# Patient Record
Sex: MALE | Race: OTHER | NOT HISPANIC OR LATINO | URBAN - METROPOLITAN AREA
[De-identification: names, ages, dates, MRNs, and addresses within clinical notes are randomized per-mention and may not be internally consistent; named-entity substitution may affect disease eponyms.]

---

## 2022-06-14 ENCOUNTER — INPATIENT (INPATIENT)
Facility: HOSPITAL | Age: 87
LOS: 5 days | Discharge: NURSING, REHAB OR LONG-TERM CARE | DRG: 92 | End: 2022-06-20
Attending: FAMILY MEDICINE | Admitting: INTERNAL MEDICINE
Payer: MEDICARE

## 2022-06-14 VITALS
TEMPERATURE: 98 F | RESPIRATION RATE: 18 BRPM | HEIGHT: 62 IN | OXYGEN SATURATION: 99 % | SYSTOLIC BLOOD PRESSURE: 100 MMHG | HEART RATE: 88 BPM | DIASTOLIC BLOOD PRESSURE: 66 MMHG | WEIGHT: 98.11 LBS

## 2022-06-14 DIAGNOSIS — Z29.9 ENCOUNTER FOR PROPHYLACTIC MEASURES, UNSPECIFIED: ICD-10-CM

## 2022-06-14 DIAGNOSIS — Z87.19 PERSONAL HISTORY OF OTHER DISEASES OF THE DIGESTIVE SYSTEM: ICD-10-CM

## 2022-06-14 DIAGNOSIS — S09.90XA UNSPECIFIED INJURY OF HEAD, INITIAL ENCOUNTER: ICD-10-CM

## 2022-06-14 DIAGNOSIS — Z87.09 PERSONAL HISTORY OF OTHER DISEASES OF THE RESPIRATORY SYSTEM: ICD-10-CM
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
## 2022-06-14 DIAGNOSIS — G96.08 OTHER CRANIAL CEREBROSPINAL FLUID LEAK: ICD-10-CM

## 2022-06-14 DIAGNOSIS — F32.9 MAJOR DEPRESSIVE DISORDER, SINGLE EPISODE, UNSPECIFIED: ICD-10-CM

## 2022-06-14 DIAGNOSIS — I10 ESSENTIAL (PRIMARY) HYPERTENSION: ICD-10-CM

## 2022-06-14 DIAGNOSIS — E78.5 HYPERLIPIDEMIA, UNSPECIFIED: ICD-10-CM

## 2022-06-14 LAB
ALBUMIN SERPL ELPH-MCNC: 3.6 G/DL — SIGNIFICANT CHANGE UP (ref 3.3–5)
ALP SERPL-CCNC: 92 U/L — SIGNIFICANT CHANGE UP (ref 40–120)
ALT FLD-CCNC: 20 U/L — SIGNIFICANT CHANGE UP (ref 12–78)
ANION GAP SERPL CALC-SCNC: 6 MMOL/L — SIGNIFICANT CHANGE UP (ref 5–17)
APTT BLD: 31.1 SEC — SIGNIFICANT CHANGE UP (ref 27.5–35.5)
AST SERPL-CCNC: 20 U/L — SIGNIFICANT CHANGE UP (ref 15–37)
BASOPHILS # BLD AUTO: 0.02 K/UL — SIGNIFICANT CHANGE UP (ref 0–0.2)
BASOPHILS NFR BLD AUTO: 0.2 % — SIGNIFICANT CHANGE UP (ref 0–2)
BILIRUB SERPL-MCNC: 0.8 MG/DL — SIGNIFICANT CHANGE UP (ref 0.2–1.2)
BUN SERPL-MCNC: 21 MG/DL — SIGNIFICANT CHANGE UP (ref 7–23)
CALCIUM SERPL-MCNC: 9.2 MG/DL — SIGNIFICANT CHANGE UP (ref 8.5–10.1)
CHLORIDE SERPL-SCNC: 107 MMOL/L — SIGNIFICANT CHANGE UP (ref 96–108)
CO2 SERPL-SCNC: 27 MMOL/L — SIGNIFICANT CHANGE UP (ref 22–31)
CREAT SERPL-MCNC: 1 MG/DL — SIGNIFICANT CHANGE UP (ref 0.5–1.3)
EGFR: 73 ML/MIN/1.73M2 — SIGNIFICANT CHANGE UP
EOSINOPHIL # BLD AUTO: 0 K/UL — SIGNIFICANT CHANGE UP (ref 0–0.5)
EOSINOPHIL NFR BLD AUTO: 0 % — SIGNIFICANT CHANGE UP (ref 0–6)
GLUCOSE SERPL-MCNC: 129 MG/DL — HIGH (ref 70–99)
HCT VFR BLD CALC: 42.4 % — SIGNIFICANT CHANGE UP (ref 39–50)
HGB BLD-MCNC: 13.6 G/DL — SIGNIFICANT CHANGE UP (ref 13–17)
IMM GRANULOCYTES NFR BLD AUTO: 0.7 % — SIGNIFICANT CHANGE UP (ref 0–1.5)
INR BLD: 1.07 RATIO — SIGNIFICANT CHANGE UP (ref 0.88–1.16)
LYMPHOCYTES # BLD AUTO: 0.94 K/UL — LOW (ref 1–3.3)
LYMPHOCYTES # BLD AUTO: 8.8 % — LOW (ref 13–44)
MCHC RBC-ENTMCNC: 28.5 PG — SIGNIFICANT CHANGE UP (ref 27–34)
MCHC RBC-ENTMCNC: 32.1 GM/DL — SIGNIFICANT CHANGE UP (ref 32–36)
MCV RBC AUTO: 88.9 FL — SIGNIFICANT CHANGE UP (ref 80–100)
MONOCYTES # BLD AUTO: 1.78 K/UL — HIGH (ref 0–0.9)
MONOCYTES NFR BLD AUTO: 16.6 % — HIGH (ref 2–14)
NEUTROPHILS # BLD AUTO: 7.89 K/UL — HIGH (ref 1.8–7.4)
NEUTROPHILS NFR BLD AUTO: 73.7 % — SIGNIFICANT CHANGE UP (ref 43–77)
NRBC # BLD: 0 /100 WBCS — SIGNIFICANT CHANGE UP (ref 0–0)
PLATELET # BLD AUTO: 200 K/UL — SIGNIFICANT CHANGE UP (ref 150–400)
POTASSIUM SERPL-MCNC: 4.1 MMOL/L — SIGNIFICANT CHANGE UP (ref 3.5–5.3)
POTASSIUM SERPL-SCNC: 4.1 MMOL/L — SIGNIFICANT CHANGE UP (ref 3.5–5.3)
PROT SERPL-MCNC: 7.2 G/DL — SIGNIFICANT CHANGE UP (ref 6–8.3)
PROTHROM AB SERPL-ACNC: 12.5 SEC — SIGNIFICANT CHANGE UP (ref 10.5–13.4)
RBC # BLD: 4.77 M/UL — SIGNIFICANT CHANGE UP (ref 4.2–5.8)
RBC # FLD: 14.1 % — SIGNIFICANT CHANGE UP (ref 10.3–14.5)
SARS-COV-2 RNA SPEC QL NAA+PROBE: SIGNIFICANT CHANGE UP
SODIUM SERPL-SCNC: 140 MMOL/L — SIGNIFICANT CHANGE UP (ref 135–145)
WBC # BLD: 10.7 K/UL — HIGH (ref 3.8–10.5)
WBC # FLD AUTO: 10.7 K/UL — HIGH (ref 3.8–10.5)

## 2022-06-14 PROCEDURE — 73610 X-RAY EXAM OF ANKLE: CPT | Mod: 26,LT

## 2022-06-14 PROCEDURE — 72125 CT NECK SPINE W/O DYE: CPT | Mod: 26,MA

## 2022-06-14 PROCEDURE — 73630 X-RAY EXAM OF FOOT: CPT | Mod: 26,LT

## 2022-06-14 PROCEDURE — 71045 X-RAY EXAM CHEST 1 VIEW: CPT | Mod: 26

## 2022-06-14 PROCEDURE — 99285 EMERGENCY DEPT VISIT HI MDM: CPT

## 2022-06-14 PROCEDURE — 70450 CT HEAD/BRAIN W/O DYE: CPT | Mod: 26,MA

## 2022-06-14 PROCEDURE — 72074 X-RAY EXAM THORAC SPINE4/>VW: CPT | Mod: 26

## 2022-06-14 PROCEDURE — 99223 1ST HOSP IP/OBS HIGH 75: CPT | Mod: GC

## 2022-06-14 RX ORDER — ALBUTEROL 90 UG/1
2 AEROSOL, METERED ORAL EVERY 6 HOURS
Refills: 0 | Status: DISCONTINUED | OUTPATIENT
Start: 2022-06-14 | End: 2022-06-20

## 2022-06-14 RX ORDER — ACETAMINOPHEN 500 MG
650 TABLET ORAL EVERY 6 HOURS
Refills: 0 | Status: DISCONTINUED | OUTPATIENT
Start: 2022-06-14 | End: 2022-06-20

## 2022-06-14 RX ORDER — MONTELUKAST 4 MG/1
10 TABLET, CHEWABLE ORAL DAILY
Refills: 0 | Status: DISCONTINUED | OUTPATIENT
Start: 2022-06-14 | End: 2022-06-20

## 2022-06-14 RX ORDER — BUDESONIDE AND FORMOTEROL FUMARATE DIHYDRATE 160; 4.5 UG/1; UG/1
2 AEROSOL RESPIRATORY (INHALATION)
Refills: 0 | Status: DISCONTINUED | OUTPATIENT
Start: 2022-06-14 | End: 2022-06-20

## 2022-06-14 RX ORDER — SERTRALINE 25 MG/1
25 TABLET, FILM COATED ORAL DAILY
Refills: 0 | Status: DISCONTINUED | OUTPATIENT
Start: 2022-06-14 | End: 2022-06-20

## 2022-06-14 RX ORDER — ATORVASTATIN CALCIUM 80 MG/1
10 TABLET, FILM COATED ORAL AT BEDTIME
Refills: 0 | Status: DISCONTINUED | OUTPATIENT
Start: 2022-06-14 | End: 2022-06-20

## 2022-06-14 RX ORDER — ACETAMINOPHEN 500 MG
650 TABLET ORAL ONCE
Refills: 0 | Status: COMPLETED | OUTPATIENT
Start: 2022-06-14 | End: 2022-06-14

## 2022-06-14 RX ADMIN — Medication 650 MILLIGRAM(S): at 21:31

## 2022-06-14 RX ADMIN — Medication 650 MILLIGRAM(S): at 13:04

## 2022-06-14 RX ADMIN — ATORVASTATIN CALCIUM 10 MILLIGRAM(S): 80 TABLET, FILM COATED ORAL at 21:31

## 2022-06-14 NOTE — H&P ADULT - NSHPPHYSICALEXAM_GEN_ALL_CORE
T(C): 36.8 (06-14-22 @ 19:42), Max: 36.9 (06-14-22 @ 12:32)  HR: 68 (06-14-22 @ 19:42) (68 - 88)  BP: 134/80 (06-14-22 @ 19:42) (100/66 - 134/80)  RR: 15 (06-14-22 @ 19:42) (15 - 18)  SpO2: 96% (06-14-22 @ 19:42) (95% - 99%)    Physical Exam:  Gen: well appearing, NAD  HEENT: NCAT, PEERLA b/l, EOMI b/l, no conjunctival erythema  Cardio: regular rate and rhythm, +s1s2, no murmurs, rubs, or gallops  Pulm: CTA b/l, no wheezes, rales or rhonchi  Abdomen: soft, nontender, nondistended, +BS x4 quadrants, no guarding  Extremities: +2 pedal pulses, dorsal aspect of left foot with ecchymosis, swelling and erythema, tender to palpation  Neuro: AAOx3, CNII-XII intact grossly, 5/5 strength all extremities, sensation intact  Skin: warm and dry

## 2022-06-14 NOTE — ED PROVIDER NOTE - CLINICAL SUMMARY MEDICAL DECISION MAKING FREE TEXT BOX
86 yo M p/w mech fall today - pt slipped off 2nd step at home, hit back of head. Pt co L foot / ankle pain as well as headache. Pt had co some mid back pain earlier, less now. no other acute inj or co. No LOC. NO numb/ting/focal weak. no abd pain. no n/v/d. no agg/allev factors. no other acute co or changes. Pt fully vaccinated for covid.  exam: MM Moist. neck supple. no meningeal signs. no ext signs of head trauma. min occipital tend, no edema. no lac. no spinal tend (c,t,l). Mild paraspinal lower thoracic tend, no edema. no crepitus.   L foot with mild tend to mid foot with slight edema, pos tend to L mid toe, Min tend to medial ankle. Nl dist str./sens equal bl, 2+ pulses. nl cap refill. no other acute findings.  [facility] no [facility]ag use. Check CT, XR, reeval

## 2022-06-14 NOTE — ED PROVIDER NOTE - CARE PLAN
1 Principal Discharge DX:	Head injury   Principal Discharge DX:	Head injury  Secondary Diagnosis:	Hygroma

## 2022-06-14 NOTE — ED PROVIDER NOTE - PHYSICAL EXAMINATION
Constitutional: Awake, Alert, non-toxic. NAD.  HEAD: Normocephalic, atraumatic.   EYES: PERRL, EOM intact, conjunctiva and sclera are clear bilaterally. No raccoon eyes.   ENT: No maldonado sign. No rhinorrhea, normal pharynx, patent, no tonsillar exudate or enlargement, mucous membranes pink/moist, no erythema, no drooling or stridor.   NECK: Supple, non-tender  BACK: No midline or paraspinal TTP of cervical/thoracic/lumbar spine, FROM. No ecchymosis or hematomas. no rib TTP.   CARDIOVASCULAR: Normal S1, S2; regular rate and rhythm.  RESPIRATORY: Normal respiratory effort; breath sounds CTAB, no wheezes, rhonchi, or rales. Speaking in full sentences. No accessory muscle use.   ABDOMEN: Soft; non-tender, non-distended.   EXTREMITIES: Full passive and active ROM in all extremities; non-tender to palpation; distal pulses palpable and symmetric, no edema, no crepitus or step off  SKIN: Warm, dry; good skin turgor, no apparent lesions or rashes, no ecchymosis, brisk capillary refill.  NEURO: A&O x3. Sensory and motor functions are grossly intact. Speech is normal. Appearance and judgement seem appropriate for gender and age. No neurological deficits. Neurovascular sensation intact motor function 5/5 of upper and lower extremities, CN II-XII grossly intact, no ataxia, absent pronator drift, intact cerebellar function. Speech clear, without articulation or word-finding difficulties. Eyes- PERRL bilaterally. EOMs in tact. No nystagmus. No facial droop.

## 2022-06-14 NOTE — ED ADULT TRIAGE NOTE - CHIEF COMPLAINT QUOTE
slipped and fell down 2 steps last night- no loc, fell and hit head, c/o left ankle pain., back pain- patient on baby aspirin

## 2022-06-14 NOTE — H&P ADULT - ASSESSMENT
Pt is an 87 y.o. M wit ha PMH of HTN, HLD, COPD, Depression, Hep C, Duodenal ulcer diease, Lung nodules who is found to have subdural hygromas on head CT. He is being admitted for further work-up and management of falls.

## 2022-06-14 NOTE — ED PROVIDER NOTE - NS ED ATTENDING STATEMENT MOD
This was a shared visit with the VINOD. I reviewed and verified the documentation and independently performed the documented:

## 2022-06-14 NOTE — ED ADULT NURSE NOTE - OBJECTIVE STATEMENT
Received the patient in the Er. Patient is alert and oriented. S/P slipped and fell down 2 steps last night- no loc, fell and hit head, c/o left ankle pain., back pain- patient on baby aspirin. Able to move all extremities.

## 2022-06-14 NOTE — H&P ADULT - NSHPSOCIALHISTORY_GEN_ALL_CORE
Denies use of etoh, tobacco and drug use. Denies use of etoh, tobacco and drug use.  Former smoker quit 15 years ago, >50 pack year history  lives home with daughter  uses cane to ambulate

## 2022-06-14 NOTE — H&P ADULT - PROBLEM SELECTOR PLAN 1
Hx fall  CT imaging - No acute intracranial pathology or MSK fractures. No C-spine/T-Spine pathology.  Subdural Hygromas findings with mass effect noted.  -Fall, safety precautions. Bed alarm  -Activity, ambulate with assistance  -Tylenol PRN for ankle pain.  -PT consulted. Hx fall - No LOC, admits hitting head, has pain in left foot/ankle  CT imaging - No acute intracranial pathology or MSK fractures. No C-spine/T-Spine pathology.  Subdural Hygromas findings with mass effect noted.  -left ankle xray - negative for acute fractures, will obtain ct of left foot and ankle to r/o occult fractures  -Fall, safety precautions. Bed alarm  -Activity, ambulate with assistance  -Tylenol PRN for ankle pain.  -PT consulted.

## 2022-06-14 NOTE — ED PROVIDER NOTE - OBJECTIVE STATEMENT
88 y/o male with PMHx HLD and COPD presents today due to fall. Pt fell yesterday after slipped on 2nd to bottom step. pt hit the back of his head and landed on back. pt reports pain to left ankle, aching, non-radiating, and currently 6/10. pt currently takes baby ASA. pt denies numbness/weakness, LOC, vomiting, visual change, cp, abd pain, neck pain, or any other complaints.   BEAU Salas

## 2022-06-14 NOTE — H&P ADULT - NSHPREVIEWOFSYSTEMS_GEN_ALL_CORE
Constitutional: denies fever, chills, sweating  HEENT: denies headache, dizziness, or lightheadedness  Respiratory: denies SOB, cough, or wheezing  Cardiovascular: denies CP, palpitations  Gastrointestinal: denies nausea, vomiting, diarrhea, constipation, abdominal pain, or bloody stools  Genitourinary: denies painful urination, increased frequency, urgency, or bloody urine  Skin/Breast: denies rashes or itching  Musculoskeletal: admits left foot pain, denies muscle aches, joint swelling, or muscle weakness  Neurologic: denies loss of sensation, numbness, or tingling  ROS negative except as noted above

## 2022-06-14 NOTE — H&P ADULT - HISTORY OF PRESENT ILLNESS
Pt is an 87 y.o. M wit ha PMH of HTN, HLD, COPD, Depression, Hep C, Duodenal ulcer diease, Lung nodules who presented to the ED with a CC of falls.    In the ED:  T 98.5 HR 88 /66 RR 18 SpO2 99% on 4L NC  WBC 10.70 Glucose 129  Head CT Non-Con: No acute intracranial injury. No acute C-spine fracture. SUBDURAL HYGROMAS ALONG THE LEFT FRONTAL TEMPORAL CONVEXITY AS WELL AS   THE LEFT LATERAL ASPECT OF THE POSTERIOR CRANIAL FOSSA MEASURING UP TO 1 CM IN THICKNESS WITH REGIONAL MASS EFFECT.  Xray Foot, T-Spine, Chest, Ankle: Slight residual right lung densities. No acute bony findings.  Tylenol 650mg PO x 1 Pt is an 87 y.o. M wit ha PMH of HTN, HLD, COPD, Depression, Hep C, Duodenal ulcer diease, Lung nodules who presented to the ED with a CC of falls. Patient states that 2 days ago he was walking down the stairs at his house when he slipped on his socks and fell. He states he hurt his left foot and hit the back of his head. Denies any LOC. Denies fever, chills, headache, dizziness, chest pain, shortness of breath, cough, nausea, vomiting, abdominal pain, diarrhea or constipation. Of note patients daughter states he has been having balance issues over the past few months and has fallen a few times.     In the ED:  T 98.5 HR 88 /66 RR 18 SpO2 99% on 4L NC  WBC 10.70 Glucose 129  Head CT Non-Con: No acute intracranial injury. No acute C-spine fracture. SUBDURAL HYGROMAS ALONG THE LEFT FRONTAL TEMPORAL CONVEXITY AS WELL AS   THE LEFT LATERAL ASPECT OF THE POSTERIOR CRANIAL FOSSA MEASURING UP TO 1 CM IN THICKNESS WITH REGIONAL MASS EFFECT.  Xray Foot, T-Spine, Chest, Ankle: Slight residual right lung densities. No acute bony findings.  Tylenol 650mg PO x 1

## 2022-06-14 NOTE — H&P ADULT - PROBLEM SELECTOR PLAN 2
CT head: subdural hygromas along the left frontal temporal convexity as well as the left lateral aspect of the posterior cranial fossa measuring up to 1 cm in thickness with regional mass effect  -Neuro surgery consulted by ED - no need for transfer  -repeat CT head in AM to monitor for any changes  -neuro checks q 4  -fall risk protocol CT head: subdural hygromas along the left frontal temporal convexity as well as the left lateral aspect of the posterior cranial fossa measuring up to 1 cm in thickness with regional mass effect  -repeat head CT in AM  -if any acute change in neurologic status re-consult neurosurgery  -Neuro surgery consulted Dr. Freire - no need for transfer  -neuro checks q 4  -fall risk protocol CT head: subdural hygromas along the left frontal temporal convexity as well as the left lateral aspect of the posterior cranial fossa measuring up to 1 cm in thickness with regional mass effect  -repeat head CT in AM  -if any acute change in neurologic status re-consult neurosurgery  -Neuro surgery consulted Dr. Freire - no need for transfer  -neuro checks q 4  -fall risk protocol  -Neurology consulted Dr Cuevas

## 2022-06-14 NOTE — ED ADULT NURSE REASSESSMENT NOTE - NS ED NURSE REASSESS COMMENT FT1
1942- Received pt from previous RN. Pt Aox4 breathing equal and unlabored. pt follows commands, moving all extremities. pt has swelling and bruising to L ankle. Ice applied. pt has positive pedal pulses bilaterally. positive sensation to all extremities. skin warm, dry and good color. pupils are +2mm equal and reactive. pt NSR on cardiac monitor. Safety measures maintained. call bell within reach. will continue to monitor. MIRANDA, RN
2214- pt stable for transport to the floor, denies pain breathing equal and unlabored. pt in no acute distress, accompanied by RN
Resting comfortably. patient had dinner and tolerated well.
2134- pt medicated as ordered. resting comfortably in stretcher. pt repositioned in stretcher, warm blankets provided. pt in no acute distress. call bell within reach. will continue to monitor. MIRANDA, RN

## 2022-06-14 NOTE — H&P ADULT - PROBLEM SELECTOR PLAN 4
-Continue montelukast, trelegy, ventolin. 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

## 2022-06-14 NOTE — ED PROVIDER NOTE - NSICDXFAMILYHX_GEN_ALL_CORE_FT
FAMILY HISTORY:  Father  Still living? No  Family history of cerebral infarction, Age at diagnosis: Age Unknown

## 2022-06-14 NOTE — H&P ADULT - ATTENDING COMMENTS
Pt is an 87 y.o. M wit ha PMH of HTN, HLD, COPD, Depression, Hep C, Duodenal ulcer diease, Lung nodules who is found to have subdural hygromas on head CT. He is being admitted for further work-up and management of falls.    Patient seen and examined at bedside. HPI as above.     T(C): 36.8 (06-14-22 @ 19:42), Max: 36.9 (06-14-22 @ 12:32)  HR: 68 (06-14-22 @ 19:42) (68 - 88)  BP: 134/80 (06-14-22 @ 19:42) (100/66 - 134/80)  RR: 15 (06-14-22 @ 19:42) (15 - 18)  SpO2: 96% (06-14-22 @ 19:42) (95% - 99%)  Wt(kg): --    Physical Exam:   GENERAL: well-groomed, NAD  HEENT: head NC/AT; EOM intact, PERRLA, conjunctiva & sclera clear; hearing grossly intact, moist mucous membranes  NECK: supple, no JVD  RESPIRATORY: CTA B/L, no wheezing, rales, rhonchi or rubs  CARDIOVASCULAR: S1&S2, RRR, no murmurs or gallops  ABDOMEN: soft, non-tender, non-distended, + Bowel sounds x4 quadrants, no guarding, rebound or rigidity  MUSCULOSKELETAL:  no clubbing, cyanosis or edema of all 4 extremities [aside from left ankle swelling]  LYMPH: no cervical lymphadenopathy  VASCULAR: Radial pulses 2+ bilaterally, no varicose veins   SKIN: warm and dry, color normal  NEUROLOGIC: AA&O X3, CN2-12 intact w/ no focal deficits, no sensory loss, motor Strength 4/5 in UE & LE B/L, speech fluent  Psych: Normal mood and affect, normal behavior    Plan:  Hygroma: likely sequalae of a previous fall.   -discussed personally with neurosurgery Dr. Freire [service # [phone]]  -repeat head CT in AM  -neuro checks as ordered.   -if any acute change in neurologic status re-consult neurosurgery  -PT consult tomorrow  -social work consult for possible rehab.     HTN: BP stable and at goal.     COPD: no acute exacerbation.   -continue inhalers as ordered.     DVT ppx: SCD"s

## 2022-06-14 NOTE — ED ADULT NURSE NOTE - NSIMPLEMENTINTERV_GEN_ALL_ED
Implemented All Fall with Harm Risk Interventions:  Crystal to call system. Call bell, personal items and telephone within reach. Instruct patient to call for assistance. Room bathroom lighting operational. Non-slip footwear when patient is off stretcher. Physically safe environment: no spills, clutter or unnecessary equipment. Stretcher in lowest position, wheels locked, appropriate side rails in place. Provide visual cue, wrist band, yellow gown, etc. Monitor gait and stability. Monitor for mental status changes and reorient to person, place, and time. Review medications for side effects contributing to fall risk. Reinforce activity limits and safety measures with patient and family. Provide visual clues: red socks.

## 2022-06-14 NOTE — ED PROVIDER NOTE - NSICDXPASTMEDICALHX_GEN_ALL_CORE_FT
PAST MEDICAL HISTORY:  COPD (chronic obstructive pulmonary disease)     Depression     Duodenal ulcer disease     Emphysema     Hepatitis C patients daughter states that she was told that the patient has hepitatis c    High cholesterol     Hypertension     Lung nodule, multiple

## 2022-06-14 NOTE — H&P ADULT - NSICDXPASTMEDICALHX_GEN_ALL_CORE_FT
PAST MEDICAL HISTORY:  COPD (chronic obstructive pulmonary disease)     Depression     Duodenal ulcer disease     Emphysema     Hepatitis C patients daughter states that she was told that the patient has hepitatis c    HLD (hyperlipidemia)     Lung nodule, multiple

## 2022-06-15 DIAGNOSIS — S92.109A UNSPECIFIED FRACTURE OF UNSPECIFIED TALUS, INITIAL ENCOUNTER FOR CLOSED FRACTURE: ICD-10-CM

## 2022-06-15 DIAGNOSIS — S92.912A UNSPECIFIED FRACTURE OF LEFT TOE(S), INITIAL ENCOUNTER FOR CLOSED FRACTURE: ICD-10-CM

## 2022-06-15 DIAGNOSIS — S92.002A UNSPECIFIED FRACTURE OF LEFT CALCANEUS, INITIAL ENCOUNTER FOR CLOSED FRACTURE: ICD-10-CM

## 2022-06-15 LAB
ANION GAP SERPL CALC-SCNC: 8 MMOL/L — SIGNIFICANT CHANGE UP (ref 5–17)
BASOPHILS # BLD AUTO: 0.02 K/UL — SIGNIFICANT CHANGE UP (ref 0–0.2)
BASOPHILS NFR BLD AUTO: 0.2 % — SIGNIFICANT CHANGE UP (ref 0–2)
BUN SERPL-MCNC: 19 MG/DL — SIGNIFICANT CHANGE UP (ref 7–23)
CALCIUM SERPL-MCNC: 9.9 MG/DL — SIGNIFICANT CHANGE UP (ref 8.5–10.1)
CHLORIDE SERPL-SCNC: 103 MMOL/L — SIGNIFICANT CHANGE UP (ref 96–108)
CO2 SERPL-SCNC: 28 MMOL/L — SIGNIFICANT CHANGE UP (ref 22–31)
CREAT SERPL-MCNC: 1 MG/DL — SIGNIFICANT CHANGE UP (ref 0.5–1.3)
EGFR: 73 ML/MIN/1.73M2 — SIGNIFICANT CHANGE UP
EOSINOPHIL # BLD AUTO: 0.02 K/UL — SIGNIFICANT CHANGE UP (ref 0–0.5)
EOSINOPHIL NFR BLD AUTO: 0.2 % — SIGNIFICANT CHANGE UP (ref 0–6)
GLUCOSE SERPL-MCNC: 176 MG/DL — HIGH (ref 70–99)
HCT VFR BLD CALC: 45.2 % — SIGNIFICANT CHANGE UP (ref 39–50)
HGB BLD-MCNC: 14.5 G/DL — SIGNIFICANT CHANGE UP (ref 13–17)
IMM GRANULOCYTES NFR BLD AUTO: 0.6 % — SIGNIFICANT CHANGE UP (ref 0–1.5)
LYMPHOCYTES # BLD AUTO: 0.79 K/UL — LOW (ref 1–3.3)
LYMPHOCYTES # BLD AUTO: 7.3 % — LOW (ref 13–44)
MCHC RBC-ENTMCNC: 28.8 PG — SIGNIFICANT CHANGE UP (ref 27–34)
MCHC RBC-ENTMCNC: 32.1 GM/DL — SIGNIFICANT CHANGE UP (ref 32–36)
MCV RBC AUTO: 89.9 FL — SIGNIFICANT CHANGE UP (ref 80–100)
MONOCYTES # BLD AUTO: 1.41 K/UL — HIGH (ref 0–0.9)
MONOCYTES NFR BLD AUTO: 13 % — SIGNIFICANT CHANGE UP (ref 2–14)
NEUTROPHILS # BLD AUTO: 8.54 K/UL — HIGH (ref 1.8–7.4)
NEUTROPHILS NFR BLD AUTO: 78.7 % — HIGH (ref 43–77)
NRBC # BLD: 0 /100 WBCS — SIGNIFICANT CHANGE UP (ref 0–0)
PLATELET # BLD AUTO: 212 K/UL — SIGNIFICANT CHANGE UP (ref 150–400)
POTASSIUM SERPL-MCNC: 3.8 MMOL/L — SIGNIFICANT CHANGE UP (ref 3.5–5.3)
POTASSIUM SERPL-SCNC: 3.8 MMOL/L — SIGNIFICANT CHANGE UP (ref 3.5–5.3)
RBC # BLD: 5.03 M/UL — SIGNIFICANT CHANGE UP (ref 4.2–5.8)
RBC # FLD: 14.4 % — SIGNIFICANT CHANGE UP (ref 10.3–14.5)
SODIUM SERPL-SCNC: 139 MMOL/L — SIGNIFICANT CHANGE UP (ref 135–145)
WBC # BLD: 10.85 K/UL — HIGH (ref 3.8–10.5)
WBC # FLD AUTO: 10.85 K/UL — HIGH (ref 3.8–10.5)

## 2022-06-15 PROCEDURE — 73700 CT LOWER EXTREMITY W/O DYE: CPT | Mod: 26,LT

## 2022-06-15 PROCEDURE — 12345: CPT | Mod: NC

## 2022-06-15 PROCEDURE — 99221 1ST HOSP IP/OBS SF/LOW 40: CPT

## 2022-06-15 PROCEDURE — 71045 X-RAY EXAM CHEST 1 VIEW: CPT | Mod: 26

## 2022-06-15 PROCEDURE — 99233 SBSQ HOSP IP/OBS HIGH 50: CPT | Mod: GC

## 2022-06-15 PROCEDURE — 76376 3D RENDER W/INTRP POSTPROCES: CPT | Mod: 26

## 2022-06-15 PROCEDURE — 70450 CT HEAD/BRAIN W/O DYE: CPT | Mod: 26

## 2022-06-15 PROCEDURE — 70551 MRI BRAIN STEM W/O DYE: CPT | Mod: 26

## 2022-06-15 RX ORDER — ENOXAPARIN SODIUM 100 MG/ML
40 INJECTION SUBCUTANEOUS EVERY 24 HOURS
Refills: 0 | Status: DISCONTINUED | OUTPATIENT
Start: 2022-06-15 | End: 2022-06-20

## 2022-06-15 RX ADMIN — ATORVASTATIN CALCIUM 10 MILLIGRAM(S): 80 TABLET, FILM COATED ORAL at 21:03

## 2022-06-15 RX ADMIN — Medication 650 MILLIGRAM(S): at 20:12

## 2022-06-15 RX ADMIN — Medication 650 MILLIGRAM(S): at 21:02

## 2022-06-15 RX ADMIN — BUDESONIDE AND FORMOTEROL FUMARATE DIHYDRATE 2 PUFF(S): 160; 4.5 AEROSOL RESPIRATORY (INHALATION) at 06:32

## 2022-06-15 RX ADMIN — Medication 0.5 MILLIGRAM(S): at 13:13

## 2022-06-15 RX ADMIN — SERTRALINE 25 MILLIGRAM(S): 25 TABLET, FILM COATED ORAL at 11:27

## 2022-06-15 RX ADMIN — BUDESONIDE AND FORMOTEROL FUMARATE DIHYDRATE 2 PUFF(S): 160; 4.5 AEROSOL RESPIRATORY (INHALATION) at 17:01

## 2022-06-15 RX ADMIN — MONTELUKAST 10 MILLIGRAM(S): 4 TABLET, CHEWABLE ORAL at 11:26

## 2022-06-15 RX ADMIN — ENOXAPARIN SODIUM 40 MILLIGRAM(S): 100 INJECTION SUBCUTANEOUS at 17:01

## 2022-06-15 NOTE — PATIENT PROFILE ADULT - FALL HARM RISK - HARM RISK INTERVENTIONS

## 2022-06-15 NOTE — CONSULT NOTE ADULT - PROBLEM SELECTOR RECOMMENDATION 9
Pt seen and evaluated  - All films reviewed: LLE: Non-displaced fractures of talar head; Non-displaced anterior process of the calcaneus; Non-displaced fractures of the base of the proximal phalanx 2nd toe  - All fractures stable and non displaced  - No surgical intervention at this time. Pt is stable from podiatry standpoint.   - Pt to remain Non-weight bearing to the LLE  - Continue pain management  - Discharge planning per primary team Pt seen and evaluated  - All films reviewed: LLE: Non-displaced fractures of talar head; Non-displaced anterior process of the calcaneus; Non-displaced fractures of the base of the proximal phalanx 2nd toe  - All fractures stable and non displaced  - No surgical intervention at this time. Pt is stable from podiatry standpoint.   - Ace dressing applied  - Pt to remain Non-weight bearing to the LLE  - Social work or Case Management: to contact Mynor Miller with [employer] for E CAM Boot. Please call   [phone] for order purchase requisition to acquire CAM boot  - Continue pain management  - Discharge planning per primary team

## 2022-06-15 NOTE — PROGRESS NOTE ADULT - SUBJECTIVE AND OBJECTIVE BOX
Patient is a 87y old  Male who presents with a chief complaint of Fall (15 William 2022 12:13)      INTERVAL HPI/OVERNIGHT EVENTS: Patient seen and examined at bedside. No overnight events occurred. Patient is complaining of a slight frontal headache after the CT head this AM. He's also complaining of LLE pain and bruising. Denies fevers, chills, lightheadedness, chest pain, dyspnea, abdominal pain, n/v/d/c.    MEDICATIONS  (STANDING):  atorvastatin 10 milliGRAM(s) Oral at bedtime  budesonide  80 MICROgram(s)/formoterol 4.5 MICROgram(s) Inhaler 2 Puff(s) Inhalation two times a day  LORazepam   Injectable 0.5 milliGRAM(s) IV Push once  montelukast 10 milliGRAM(s) Oral daily  sertraline 25 milliGRAM(s) Oral daily    MEDICATIONS  (PRN):  acetaminophen     Tablet .. 650 milliGRAM(s) Oral every 6 hours PRN Temp greater or equal to 38C (100.4F), Mild Pain (1 - 3)  ALBUTerol    90 MICROgram(s) HFA Inhaler 2 Puff(s) Inhalation every 6 hours PRN Shortness of Breath and/or Wheezing      Allergies    No Known Allergies    Intolerances        REVIEW OF SYSTEMS:  CONSTITUTIONAL: No fever or chills  HEENT:  Admits to headache, no sore throat  RESPIRATORY: No cough, wheezing, or shortness of breath  CARDIOVASCULAR: No chest pain, palpitations  GASTROINTESTINAL: No abd pain, nausea, vomiting, or diarrhea  GENITOURINARY: No dysuria, frequency, or hematuria  NEUROLOGICAL: no focal weakness or dizziness  MUSCULOSKELETAL: admits to LLE pain     Vital Signs Last 24 Hrs  T(C): 37.2 (15 William 2022 11:47), Max: 37.2 (15 William 2022 11:47)  T(F): 98.9 (15 William 2022 11:47), Max: 98.9 (15 William 2022 11:47)  HR: 80 (15 William 2022 11:47) (68 - 80)  BP: 137/73 (15 William 2022 11:47) (110/60 - 146/74)  BP(mean): --  RR: 18 (15 William 2022 11:47) (15 - 18)  SpO2: 92% (15 William 2022 11:47) (92% - 98%)    PHYSICAL EXAM:  GENERAL: NAD, eating breakfast   HEENT:  anicteric, moist mucous membranes  CHEST/LUNG:  decreased left lung field breath sounds, no rales, wheezes, or rhonchi  HEART:  RRR, S1, S2  ABDOMEN:  BS+, soft, nontender, nondistended  EXTREMITIES: LLE 2nd digit ecchymotic, entire LLE swollen, ROM intact   NERVOUS SYSTEM: answers questions and follows commands appropriately    LABS:                        14.5   10.85 )-----------( 212      ( 15 William 2022 09:20 )             45.2     CBC Full  -  ( 15 William 2022 09:20 )  WBC Count : 10.85 K/uL  Hemoglobin : 14.5 g/dL  Hematocrit : 45.2 %  Platelet Count - Automated : 212 K/uL  Mean Cell Volume : 89.9 fl  Mean Cell Hemoglobin : 28.8 pg  Mean Cell Hemoglobin Concentration : 32.1 gm/dL  Auto Neutrophil # : 8.54 K/uL  Auto Lymphocyte # : 0.79 K/uL  Auto Monocyte # : 1.41 K/uL  Auto Eosinophil # : 0.02 K/uL  Auto Basophil # : 0.02 K/uL  Auto Neutrophil % : 78.7 %  Auto Lymphocyte % : 7.3 %  Auto Monocyte % : 13.0 %  Auto Eosinophil % : 0.2 %  Auto Basophil % : 0.2 %    15 William 2022 09:20    139    |  103    |  19     ----------------------------<  176    3.8     |  28     |  1.00     Ca    9.9        15 William 2022 09:20    TPro  7.2    /  Alb  3.6    /  TBili  0.8    /  DBili  x      /  AST  20     /  ALT  20     /  AlkPhos  92     14 Jun 2022 15:22    PT/INR - ( 14 Jun 2022 15:22 )   PT: 12.5 sec;   INR: 1.07 ratio         PTT - ( 14 Jun 2022 15:22 )  PTT:31.1 sec    CAPILLARY BLOOD GLUCOSE              RADIOLOGY & ADDITIONAL TESTS:  < from: CT Ankle No Cont, Left (06.15.22 @ 09:05) >  1. Mildly displaced cortical fracture involves the dorsum of the talar   head.  2. Nondisplaced fracture of the anterior process of the calcaneus with   nonosseous calcaneonavicular coalition.  3. Nondisplaced fracture of the 2nd proximal phalanx base.    < end of copied text >      Personally reviewed.     Consultant(s) Notes Reviewed:  [x] YES  [ ] NO

## 2022-06-15 NOTE — PROGRESS NOTE ADULT - SUBJECTIVE AND OBJECTIVE BOX
neuro cons dict  seen for fall/subdural hygroma  repeat ct head- unchanged  brain mri.  management dw dr diggs  and daughter nikki

## 2022-06-15 NOTE — CONSULT NOTE ADULT - SUBJECTIVE AND OBJECTIVE BOX
HPI:  Pt is an 87 y.o. M wit ha PMH of HTN, HLD, COPD, Depression, Hep C, Duodenal ulcer diease, Lung nodules who presented to the ED with a CC of falls. Patient states that 2 days ago he was walking down the stairs at his house when he slipped on his socks and fell. He states he hurt his left foot and hit the back of his head. Denies any LOC. Denies fever, chills, headache, dizziness, chest pain, shortness of breath, cough, nausea, vomiting, abdominal pain, diarrhea or constipation. Of note patients daughter states he has been having balance issues over the past few months and has fallen a few times.     In the ED:  T 98.5 HR 88 /66 RR 18 SpO2 99% on 4L NC  WBC 10.70 Glucose 129  Head CT Non-Con: No acute intracranial injury. No acute C-spine fracture. SUBDURAL HYGROMAS ALONG THE LEFT FRONTAL TEMPORAL CONVEXITY AS WELL AS   THE LEFT LATERAL ASPECT OF THE POSTERIOR CRANIAL FOSSA MEASURING UP TO 1 CM IN THICKNESS WITH REGIONAL MASS EFFECT.  Xray Foot, T-Spine, Chest, Ankle: Slight residual right lung densities. No acute bony findings.  Tylenol 650mg PO x 1 (2022 17:51)      87y year old Male seen at [facility] TELE 317 W1 for ----------.  Denies any fever, chills, nausea, vomiting, chest pain, shortness of breath, or calf pain at this time.    REVIEW OF SYSTEMS    PAST MEDICAL & SURGICAL HISTORY:  Lung nodule, multiple      COPD (chronic obstructive pulmonary disease)      Depression      Emphysema      Duodenal ulcer disease      Hepatitis C  patients daughter states that she was told that the patient has hepitatis c      HLD (hyperlipidemia)      No significant past surgical history          Allergies    No Known Allergies    Intolerances        MEDICATIONS  (STANDING):  atorvastatin 10 milliGRAM(s) Oral at bedtime  budesonide  80 MICROgram(s)/formoterol 4.5 MICROgram(s) Inhaler 2 Puff(s) Inhalation two times a day  LORazepam   Injectable 0.5 milliGRAM(s) IV Push once  montelukast 10 milliGRAM(s) Oral daily  sertraline 25 milliGRAM(s) Oral daily    MEDICATIONS  (PRN):  acetaminophen     Tablet .. 650 milliGRAM(s) Oral every 6 hours PRN Temp greater or equal to 38C (100.4F), Mild Pain (1 - 3)  ALBUTerol    90 MICROgram(s) HFA Inhaler 2 Puff(s) Inhalation every 6 hours PRN Shortness of Breath and/or Wheezing      Social History:  Denies use of etoh, tobacco and drug use.  Former smoker quit 15 years ago, >50 pack year history  lives home with daughter  uses cane to ambulate (2022 17:51)      FAMILY HISTORY:  Family history of cerebral infarction (Father)  father  age 71        Vital Signs Last 24 Hrs  T(C): 37.2 (15 William 2022 11:47), Max: 37.2 (15 William 2022 11:47)  T(F): 98.9 (15 William 2022 11:47), Max: 98.9 (15 William 2022 11:47)  HR: 80 (15 William 2022 11:47) (68 - 88)  BP: 137/73 (15 William 2022 11:47) (100/66 - 146/74)  BP(mean): --  RR: 18 (15 William 2022 11:47) (15 - 18)  SpO2: 92% (15 William 2022 11:47) (92% - 99%)    PHYSICAL EXAM:  Vascular: DP & PT palpable bilaterally, Capillary refill 3 seconds, Digital hair present bilaterally  Neurological: Light touch sensation intact bilaterally  Musculoskeletal: 5/5 strength in all quadrants bilaterally, AJ & STJ ROM intact  Dermatological: ---------- cm ulceration noted to the ----------, granular wound bed, no probe to bone, no periwound erythema, no fluctuance, no malodor, no proximal streaking at this time    CBC Full  -  ( 15 William 2022 09:20 )  WBC Count : 10.85 K/uL  RBC Count : 5.03 M/uL  Hemoglobin : 14.5 g/dL  Hematocrit : 45.2 %  Platelet Count - Automated : 212 K/uL  Mean Cell Volume : 89.9 fl  Mean Cell Hemoglobin : 28.8 pg  Mean Cell Hemoglobin Concentration : 32.1 gm/dL  Auto Neutrophil # : 8.54 K/uL  Auto Lymphocyte # : 0.79 K/uL  Auto Monocyte # : 1.41 K/uL  Auto Eosinophil # : 0.02 K/uL  Auto Basophil # : 0.02 K/uL  Auto Neutrophil % : 78.7 %  Auto Lymphocyte % : 7.3 %  Auto Monocyte % : 13.0 %  Auto Eosinophil % : 0.2 %  Auto Basophil % : 0.2 %      ----------CHEM PANEL----------    PT/INR - ( 2022 15:22 )   PT: 12.5 sec;   INR: 1.07 ratio         PTT - ( 2022 15:22 )  PTT:31.1 sec        Imaging:   ACC: [id number] [facility]  ACC: [id number] EXAM: CT FOOT ONLY LT  ACC: [id number] EXAM: CT ANKLE ONLY LT  ACC: [id number] [facility]    PROCEDURE DATE: 06/15/2022        INTERPRETATION: CT ANKLE LEFT, CT 3D RECONSTRUCTION WO WORKSTATION, CT FOOT LEFT, CT 3D RECONSTRUCTION WO WORKSTATION    HISTORY: Pain, fall.    TECHNIQUE: Contiguous axial imaging was performed through the left ankle/foot. Coronal and sagittal reformatting was utilized. 3-D reformatting was performed    COMPARISON: Left foot and ankle x-rays dated 2022    FINDINGS:    OSSEOUS STRUCTURES  Acute Fractures: Mildly displaced cortical fracture involves the dorsal margin of the talar head. There is a nondisplaced fracture involving the anterior process of the calcaneus. Nondisplaced transverse fracture is present involving the 2nd proximal phalanx base.  Chronic Fractures/Ossifications: None.  Tarsal Coalition: Anterior process of the calcaneus is elongated and there is nonosseous calcaneonavicular coalition with cortical irregularity and cystic changes at the coalition.    JOINTS  Tibiotalar Joint: Intact.  Subtalar Joints: Intact.  Intertarsal Joints: Intact.  Tarsometatarsal Joints: Intact.  Metatarsophalangeal Joints: Intact.  Interphalangeal Joints: Intact.    MYOTENDINOUS STRUCTURES  Intact within limits of CT.    OTHER SOFT TISSUES  Mild to moderate atherosclerotic calcifications are present.  Mild subcutaneous edema is present.    IMPRESSION:  1. Mildly displaced cortical fracture involves the dorsum of the talar head.  2. Nondisplaced fracture of the anterior process of the calcaneus with nonosseous calcaneonavicular coalition.  3. Nondisplaced fracture of the 2nd proximal phalanx base.    --- End of Report ---       HPI:  Pt is an 87 y.o. M wit ha PMH of HTN, HLD, COPD, Depression, Hep C, Duodenal ulcer diease, Lung nodules who presented to the ED with a CC of falls. Patient states that 2 days ago he was walking down the stairs at his house when he slipped on his socks and fell. He states he hurt his left foot and hit the back of his head. Denies any LOC. Denies fever, chills, headache, dizziness, chest pain, shortness of breath, cough, nausea, vomiting, abdominal pain, diarrhea or constipation. Of note patients daughter states he has been having balance issues over the past few months and has fallen a few times.     In the ED:  T 98.5 HR 88 /66 RR 18 SpO2 99% on 4L NC  WBC 10.70 Glucose 129  Head CT Non-Con: No acute intracranial injury. No acute C-spine fracture. SUBDURAL HYGROMAS ALONG THE LEFT FRONTAL TEMPORAL CONVEXITY AS WELL AS   THE LEFT LATERAL ASPECT OF THE POSTERIOR CRANIAL FOSSA MEASURING UP TO 1 CM IN THICKNESS WITH REGIONAL MASS EFFECT.  Xray Foot, T-Spine, Chest, Ankle: Slight residual right lung densities. No acute bony findings.  Tylenol 650mg PO x 1 (2022 17:51)      87y year old Male seen at [facility] TELE 317 W1 for Multiple foot and ankle fractures.  Denies any fever, chills, nausea, vomiting, chest pain, shortness of breath, or calf pain at this time.    REVIEW OF SYSTEMS    PAST MEDICAL & SURGICAL HISTORY:  Lung nodule, multiple      COPD (chronic obstructive pulmonary disease)      Depression      Emphysema      Duodenal ulcer disease      Hepatitis C  patients daughter states that she was told that the patient has hepitatis c      HLD (hyperlipidemia)      No significant past surgical history          Allergies    No Known Allergies    Intolerances        MEDICATIONS  (STANDING):  atorvastatin 10 milliGRAM(s) Oral at bedtime  budesonide  80 MICROgram(s)/formoterol 4.5 MICROgram(s) Inhaler 2 Puff(s) Inhalation two times a day  LORazepam   Injectable 0.5 milliGRAM(s) IV Push once  montelukast 10 milliGRAM(s) Oral daily  sertraline 25 milliGRAM(s) Oral daily    MEDICATIONS  (PRN):  acetaminophen     Tablet .. 650 milliGRAM(s) Oral every 6 hours PRN Temp greater or equal to 38C (100.4F), Mild Pain (1 - 3)  ALBUTerol    90 MICROgram(s) HFA Inhaler 2 Puff(s) Inhalation every 6 hours PRN Shortness of Breath and/or Wheezing      Social History:  Denies use of etoh, tobacco and drug use.  Former smoker quit 15 years ago, >50 pack year history  lives home with daughter  uses cane to ambulate (2022 17:51)      FAMILY HISTORY:  Family history of cerebral infarction (Father)  father  age 71        Vital Signs Last 24 Hrs  T(C): 37.2 (15 William 2022 11:47), Max: 37.2 (15 William 2022 11:47)  T(F): 98.9 (15 William 2022 11:47), Max: 98.9 (15 William 2022 11:47)  HR: 80 (15 William 2022 11:47) (68 - 88)  BP: 137/73 (15 William 2022 11:47) (100/66 - 146/74)  BP(mean): --  RR: 18 (15 William 2022 11:47) (15 - 18)  SpO2: 92% (15 William 2022 11:47) (92% - 99%)    PHYSICAL EXAM:  Vascular: DP & PT palpable bilaterally, Capillary refill 3 seconds, Digital hair present bilaterally  Neurological: Light touch sensation intact bilaterally  Musculoskeletal: Tenderness on palpation to the calcaneus and 5/5 strength in all quadrants bilaterally, AJ & STJ ROM intact  Dermatological: Ecchymosis noted to the medial and lateral calcaneus; dorsal forefoot, 2nd and 3rd toes. No open wounds, no lesions, no maceration.       CBC Full  -  ( 15 William 2022 09:20 )  WBC Count : 10.85 K/uL  RBC Count : 5.03 M/uL  Hemoglobin : 14.5 g/dL  Hematocrit : 45.2 %  Platelet Count - Automated : 212 K/uL  Mean Cell Volume : 89.9 fl  Mean Cell Hemoglobin : 28.8 pg  Mean Cell Hemoglobin Concentration : 32.1 gm/dL  Auto Neutrophil # : 8.54 K/uL  Auto Lymphocyte # : 0.79 K/uL  Auto Monocyte # : 1.41 K/uL  Auto Eosinophil # : 0.02 K/uL  Auto Basophil # : 0.02 K/uL  Auto Neutrophil % : 78.7 %  Auto Lymphocyte % : 7.3 %  Auto Monocyte % : 13.0 %  Auto Eosinophil % : 0.2 %  Auto Basophil % : 0.2 %      ----------CHEM PANEL----------    PT/INR - ( 2022 15:22 )   PT: 12.5 sec;   INR: 1.07 ratio         PTT - ( 2022 15:22 )  PTT:31.1 sec        Imaging:   ACC: [id number] EXAM: CT 3D RECONSTRUCT WO [facility]  ACC: [id number] EXAM: CT FOOT ONLY LT  ACC: [id number] EXAM: CT ANKLE ONLY LT  ACC: [id number] EXAM: CT 3D RECONSTRUCT WO [facility]    PROCEDURE DATE: 06/15/2022        INTERPRETATION: CT ANKLE LEFT, CT 3D RECONSTRUCTION WO WORKSTATION, CT FOOT LEFT, CT 3D RECONSTRUCTION WO WORKSTATION    HISTORY: Pain, fall.    TECHNIQUE: Contiguous axial imaging was performed through the left ankle/foot. Coronal and sagittal reformatting was utilized. 3-D reformatting was performed    COMPARISON: Left foot and ankle x-rays dated 2022    FINDINGS:    OSSEOUS STRUCTURES  Acute Fractures: Mildly displaced cortical fracture involves the dorsal margin of the talar head. There is a nondisplaced fracture involving the anterior process of the calcaneus. Nondisplaced transverse fracture is present involving the 2nd proximal phalanx base.  Chronic Fractures/Ossifications: None.  Tarsal Coalition: Anterior process of the calcaneus is elongated and there is nonosseous calcaneonavicular coalition with cortical irregularity and cystic changes at the coalition.    JOINTS  Tibiotalar Joint: Intact.  Subtalar Joints: Intact.  Intertarsal Joints: Intact.  Tarsometatarsal Joints: Intact.  Metatarsophalangeal Joints: Intact.  Interphalangeal Joints: Intact.    MYOTENDINOUS STRUCTURES  Intact within limits of CT.    OTHER SOFT TISSUES  Mild to moderate atherosclerotic calcifications are present.  Mild subcutaneous edema is present.    IMPRESSION:  1. Mildly displaced cortical fracture involves the dorsum of the talar head.  2. Nondisplaced fracture of the anterior process of the calcaneus with nonosseous calcaneonavicular coalition.  3. Nondisplaced fracture of the 2nd proximal phalanx base.    --- End of Report ---

## 2022-06-15 NOTE — PROGRESS NOTE ADULT - ASSESSMENT
Pt is an 87 y.o. M wit ha PMH of HTN, HLD, COPD, Depression, Hep C, Duodenal ulcer diease, Lung nodules who is found to have subdural hygromas on head CT. He is being admitted for further work-up and management of falls. CT foot with L talar, calcaneal and phalanx fracture. No surgical intervention per podiatry.

## 2022-06-15 NOTE — CONSULT NOTE ADULT - ASSESSMENT
LLE: Non-displaced fractures of talar head; Non-displaced anterior process of the calcaneus; Non-displaced fractures of the base of the proximal phalanx 2nd toe

## 2022-06-15 NOTE — CHART NOTE - NSCHARTNOTEFT_GEN_A_CORE
Received a call from the pt's nurse. Temp 103F, . Pt reports burning on urination, difficulty urinating. Wearing a [place] catheter, no morrison. CXR: no consolidation, effusion, pneumothorax. Ordered BCx x 2, UA, UCx, procalcitonin.     General: Awake and alert, cooperative with exam. No acute distress.   Skin: Warm, dry, and pink.   Cardiology: Normal S1, S2. No murmurs, rubs, or gallops. Regular rate and rhythm.   Respiratory: Lungs clear to ascultation bilaterally. Good air exchange. No wheezes, rales, or rhonchi. Normal chest expansion.   Extremities: No peripheral edema bilaterally. Dorsalis pedis pulses 2+ bilaterally.

## 2022-06-16 DIAGNOSIS — N39.0 URINARY TRACT INFECTION, SITE NOT SPECIFIED: ICD-10-CM

## 2022-06-16 LAB
ANION GAP SERPL CALC-SCNC: 6 MMOL/L — SIGNIFICANT CHANGE UP (ref 5–17)
APPEARANCE UR: ABNORMAL
BILIRUB UR-MCNC: NEGATIVE — SIGNIFICANT CHANGE UP
BUN SERPL-MCNC: 18 MG/DL — SIGNIFICANT CHANGE UP (ref 7–23)
CALCIUM SERPL-MCNC: 10.1 MG/DL — SIGNIFICANT CHANGE UP (ref 8.5–10.1)
CHLORIDE SERPL-SCNC: 104 MMOL/L — SIGNIFICANT CHANGE UP (ref 96–108)
CO2 SERPL-SCNC: 30 MMOL/L — SIGNIFICANT CHANGE UP (ref 22–31)
COLOR SPEC: YELLOW — SIGNIFICANT CHANGE UP
CREAT SERPL-MCNC: 0.93 MG/DL — SIGNIFICANT CHANGE UP (ref 0.5–1.3)
DIFF PNL FLD: ABNORMAL
EGFR: 79 ML/MIN/1.73M2 — SIGNIFICANT CHANGE UP
GLUCOSE SERPL-MCNC: 129 MG/DL — HIGH (ref 70–99)
GLUCOSE UR QL: NEGATIVE — SIGNIFICANT CHANGE UP
HCT VFR BLD CALC: 44.9 % — SIGNIFICANT CHANGE UP (ref 39–50)
HGB BLD-MCNC: 14.5 G/DL — SIGNIFICANT CHANGE UP (ref 13–17)
KETONES UR-MCNC: ABNORMAL
LEUKOCYTE ESTERASE UR-ACNC: ABNORMAL
MCHC RBC-ENTMCNC: 28.7 PG — SIGNIFICANT CHANGE UP (ref 27–34)
MCHC RBC-ENTMCNC: 32.3 GM/DL — SIGNIFICANT CHANGE UP (ref 32–36)
MCV RBC AUTO: 88.7 FL — SIGNIFICANT CHANGE UP (ref 80–100)
NITRITE UR-MCNC: POSITIVE
NRBC # BLD: 0 /100 WBCS — SIGNIFICANT CHANGE UP (ref 0–0)
PH UR: 5 — SIGNIFICANT CHANGE UP (ref 5–8)
PLATELET # BLD AUTO: 187 K/UL — SIGNIFICANT CHANGE UP (ref 150–400)
POTASSIUM SERPL-MCNC: 4.8 MMOL/L — SIGNIFICANT CHANGE UP (ref 3.5–5.3)
POTASSIUM SERPL-SCNC: 4.8 MMOL/L — SIGNIFICANT CHANGE UP (ref 3.5–5.3)
PROCALCITONIN SERPL-MCNC: 0.14 NG/ML — HIGH (ref 0–0.04)
PROT UR-MCNC: 100
RBC # BLD: 5.06 M/UL — SIGNIFICANT CHANGE UP (ref 4.2–5.8)
RBC # FLD: 14.4 % — SIGNIFICANT CHANGE UP (ref 10.3–14.5)
SODIUM SERPL-SCNC: 140 MMOL/L — SIGNIFICANT CHANGE UP (ref 135–145)
SP GR SPEC: 1.02 — SIGNIFICANT CHANGE UP (ref 1.01–1.02)
UROBILINOGEN FLD QL: NEGATIVE — SIGNIFICANT CHANGE UP
WBC # BLD: 8.67 K/UL — SIGNIFICANT CHANGE UP (ref 3.8–10.5)
WBC # FLD AUTO: 8.67 K/UL — SIGNIFICANT CHANGE UP (ref 3.8–10.5)

## 2022-06-16 PROCEDURE — 99233 SBSQ HOSP IP/OBS HIGH 50: CPT | Mod: GC

## 2022-06-16 PROCEDURE — 99221 1ST HOSP IP/OBS SF/LOW 40: CPT

## 2022-06-16 PROCEDURE — 99222 1ST HOSP IP/OBS MODERATE 55: CPT

## 2022-06-16 RX ORDER — CEFTRIAXONE 500 MG/1
1000 INJECTION, POWDER, FOR SOLUTION INTRAMUSCULAR; INTRAVENOUS EVERY 24 HOURS
Refills: 0 | Status: COMPLETED | OUTPATIENT
Start: 2022-06-16 | End: 2022-06-18

## 2022-06-16 RX ORDER — TIOTROPIUM BROMIDE 18 UG/1
1 CAPSULE ORAL; RESPIRATORY (INHALATION) DAILY
Refills: 0 | Status: DISCONTINUED | OUTPATIENT
Start: 2022-06-16 | End: 2022-06-20

## 2022-06-16 RX ADMIN — SERTRALINE 25 MILLIGRAM(S): 25 TABLET, FILM COATED ORAL at 11:52

## 2022-06-16 RX ADMIN — ATORVASTATIN CALCIUM 10 MILLIGRAM(S): 80 TABLET, FILM COATED ORAL at 21:03

## 2022-06-16 RX ADMIN — ENOXAPARIN SODIUM 40 MILLIGRAM(S): 100 INJECTION SUBCUTANEOUS at 17:58

## 2022-06-16 RX ADMIN — CEFTRIAXONE 100 MILLIGRAM(S): 500 INJECTION, POWDER, FOR SOLUTION INTRAMUSCULAR; INTRAVENOUS at 11:51

## 2022-06-16 RX ADMIN — BUDESONIDE AND FORMOTEROL FUMARATE DIHYDRATE 2 PUFF(S): 160; 4.5 AEROSOL RESPIRATORY (INHALATION) at 06:45

## 2022-06-16 RX ADMIN — BUDESONIDE AND FORMOTEROL FUMARATE DIHYDRATE 2 PUFF(S): 160; 4.5 AEROSOL RESPIRATORY (INHALATION) at 18:32

## 2022-06-16 RX ADMIN — MONTELUKAST 10 MILLIGRAM(S): 4 TABLET, CHEWABLE ORAL at 11:51

## 2022-06-16 NOTE — PROGRESS NOTE ADULT - SUBJECTIVE AND OBJECTIVE BOX
Neurology Follow up note    CATHERINE [id number]    HPI:  Pt is an 87 y.o. M wit ha PMH of HTN, HLD, COPD, Depression, Hep C, Duodenal ulcer diease, Lung nodules who presented to the ED with a CC of falls. Patient states that 2 days ago he was walking down the stairs at his house when he slipped on his socks and fell. He states he hurt his left foot and hit the back of his head. Denies any LOC. Denies fever, chills, headache, dizziness, chest pain, shortness of breath, cough, nausea, vomiting, abdominal pain, diarrhea or constipation. Of note patients daughter states he has been having balance issues over the past few months and has fallen a few times.     In the ED:  T 98.5 HR 88 /66 RR 18 SpO2 99% on 4L NC  WBC 10.70 Glucose 129  Head CT Non-Con: No acute intracranial injury. No acute C-spine fracture. SUBDURAL HYGROMAS ALONG THE LEFT FRONTAL TEMPORAL CONVEXITY AS WELL AS   THE LEFT LATERAL ASPECT OF THE POSTERIOR CRANIAL FOSSA MEASURING UP TO 1 CM IN THICKNESS WITH REGIONAL MASS EFFECT.  Xray Foot, T-Spine, Chest, Ankle: Slight residual right lung densities. No acute bony findings.  Tylenol 650mg PO x 1 (2022 17:51)      Interval History -no new events    Patient is seen, chart was reviewed and case was discussed with the treatment team.  Pt is not in any distress.   Lying on bed comfortably.       Vital Signs Last 24 Hrs  T(C): 37.2 (2022 11:49), Max: 39.6 (15 William 2022 20:02)  T(F): 98.9 (2022 11:49), Max: 103.3 (15 William 2022 20:02)  HR: 82 (2022 11:49) (78 - 125)  BP: 112/63 (2022 11:49) (112/63 - 155/78)  BP(mean): 99 (15 William 2022 20:02) (99 - 99)  RR: 18 (2022 11:49) (18 - 20)  SpO2: 92% (2022 11:49) (92% - 95%)        REVIEW OF SYSTEMS:    Constitutional: No fever, weight   Eyes: No eye pain, visual disturbances, or discharge  ENT:  No difficulty hearing, tinnitus, vertigo; No sinus or throat pain  Neck: No pain or stiffness  Respiratory: No cough, wheezing, chills or hemoptysis  Cardiovascular: No chest pain, palpitations, shortness of breath, dizziness or leg swelling  Gastrointestinal: No abdominal or epigastric pain. No nausea, vomiting or hematemesis;   Genitourinary: No dysuria, frequency, hematuria or incontinence  Neurological: No headache, loss of strength, numbness or tremors  Psychiatric: No depression, anxiety, mood swings or difficulty sleeping  Skin: No itching, burning, rashes or lesions   Lymph Nodes: No enlarged glands  Endocrine: No heat or cold intolerance; No hair loss,   Allergy and Immunologic: No hives or eczema    On Neurological Examination:    Mental Status - Pt is alert, awake, . Follows commands well and able to answer questions appropriately.Mood and affect  normal    Speech -  Normal.     Cranial Nerves - Pupils 3 mm equal and reactive to light, extraocular eye movements intact. Pt has no visual field deficit.  Pt has no  facial asymmetry. Facial sensation is intact.Tongue - is in midline.    Muscle tone - is normal all over. Moves all extremities equally. No asymmetry is seen.      Motor Exam - 5/5 of UE  LE 4/5   No drift. No shaking or tremors.    Sensory Exam -. Pt withdraws all extremities equally on stimulation. No asymmetry seen. No complaints of tingling, numbness.        coordination:    Finger to nose: julio    Deep tendon Reflexes - 2 plus all over.     Neck Supple -  Yes.     MEDICATIONS    acetaminophen     Tablet .. 650 milliGRAM(s) Oral every 6 hours PRN  ALBUTerol    90 MICROgram(s) HFA Inhaler 2 Puff(s) Inhalation every 6 hours PRN  atorvastatin 10 milliGRAM(s) Oral at bedtime  budesonide  80 MICROgram(s)/formoterol 4.5 MICROgram(s) Inhaler 2 Puff(s) Inhalation two times a day  cefTRIAXone   IVPB 1000 milliGRAM(s) IV Intermittent every 24 hours  enoxaparin Injectable 40 milliGRAM(s) SubCutaneous every 24 hours  montelukast 10 milliGRAM(s) Oral daily  sertraline 25 milliGRAM(s) Oral daily  tiotropium 18 MICROgram(s) Capsule 1 Capsule(s) Inhalation daily      Allergies    No Known Allergies    Intolerances        LABS:  CBC Full  -  ( 2022 07:50 )  WBC Count : 8.67 K/uL  RBC Count : 5.06 M/uL  Hemoglobin : 14.5 g/dL  Hematocrit : 44.9 %  Platelet Count - Automated : 187 K/uL  Mean Cell Volume : 88.7 fl  Mean Cell Hemoglobin : 28.7 pg  Mean Cell Hemoglobin Concentration : 32.3 gm/dL  Auto Neutrophil # : x      Urinalysis Basic - ( 2022 07:28 )    Color: Yellow / Appearance: very cloudy / S.020 / pH: x  Gluc: x / Ketone: Trace  / Bili: Negative / Urobili: Negative   Blood: x / Protein: 100 / Nitrite: Positive   Leuk Esterase: Moderate / RBC: 11-25 /HPF / WBC >50   Sq Epi: x / Non Sq Epi: Occasional / Bacteria: Many      -16    140  |  104  |  18  ----------------------------<  129<H>  4.8   |  30  |  0.93    Ca    10.1      2022 07:50      Hemoglobin A1C:     Vitamin B12     RADIOLOGY    ASSESSMENT AND PLAN:      ABNORMALITY OF GAIT WITH FALL  NO ACUTE CVA  CHRONIC FRONTAL HYGROMA/HEMATOMA  left Foot fracture    pain control  Physical therapy evaluation.  OOB to chair/ambulation with assistance only  Pain is accessed and addressed.  Would continue to follow.

## 2022-06-16 NOTE — PHYSICAL THERAPY INITIAL EVALUATION ADULT - TRANSFER TRAINING, PT EVAL

## 2022-06-16 NOTE — PROGRESS NOTE ADULT - SUBJECTIVE AND OBJECTIVE BOX
HPI:  Pt is an 87 y.o. M wit ha PMH of HTN, HLD, COPD, Depression, Hep C, Duodenal ulcer diease, Lung nodules who presented to the ED with a CC of falls. Pt seen at bedside for Non-displaced fractures of talar head; Non-displaced anterior process of the calcaneus; Non-displaced fractures of the base of the proximal phalanx 2nd toe. Pt doing well. Resting comfortably.         87y year old Male seen at [facility] TELE 317 W1 for ----------.  Denies any fever, chills, nausea, vomiting, chest pain, shortness of breath, or calf pain at this time.    REVIEW OF SYSTEMS    PAST MEDICAL & SURGICAL HISTORY:  Lung nodule, multiple      COPD (chronic obstructive pulmonary disease)      Depression      Emphysema      Duodenal ulcer disease      Hepatitis C  patients daughter states that she was told that the patient has hepitatis c      HLD (hyperlipidemia)      No significant past surgical history          Allergies    No Known Allergies    Intolerances      MEDICATIONS  (STANDING):  atorvastatin 10 milliGRAM(s) Oral at bedtime  budesonide  80 MICROgram(s)/formoterol 4.5 MICROgram(s) Inhaler 2 Puff(s) Inhalation two times a day  cefTRIAXone   IVPB 1000 milliGRAM(s) IV Intermittent every 24 hours  enoxaparin Injectable 40 milliGRAM(s) SubCutaneous every 24 hours  montelukast 10 milliGRAM(s) Oral daily  sertraline 25 milliGRAM(s) Oral daily  tiotropium 18 MICROgram(s) Capsule 1 Capsule(s) Inhalation daily    MEDICATIONS  (PRN):  acetaminophen     Tablet .. 650 milliGRAM(s) Oral every 6 hours PRN Temp greater or equal to 38C (100.4F), Mild Pain (1 - 3)  ALBUTerol    90 MICROgram(s) HFA Inhaler 2 Puff(s) Inhalation every 6 hours PRN Shortness of Breath and/or Wheezing      Social History:  Denies use of etoh, tobacco and drug use.  Former smoker quit 15 years ago, >50 pack year history  lives home with daughter  uses cane to ambulate (2022 17:51)      FAMILY HISTORY:  Family history of cerebral infarction (Father)  father  age 71        Vital Signs Last 24 Hrs  T(C): 37.2 (2022 11:49), Max: 39.6 (15 William 2022 20:02)  T(F): 98.9 (2022 11:49), Max: 103.3 (15 William 2022 20:02)  HR: 82 (2022 11:49) (78 - 125)  BP: 112/63 (2022 11:49) (112/63 - 155/78)  BP(mean): 99 (15 William 2022 20:02) (99 - 99)  RR: 18 (2022 11:49) (18 - 20)  SpO2: 92% (2022 11:49) (92% - 95%)    PHYSICAL EXAM:  Vascular: DP & PT palpable bilaterally, Capillary refill 3 seconds, Digital hair present bilaterally  Neurological: Light touch sensation intact bilaterally  Musculoskeletal: Tenderness on palpation to the calcaneus and 5/5 strength in all quadrants bilaterally, AJ & STJ ROM intact  Dermatological: Ecchymosis noted to the medial and lateral calcaneus; dorsal forefoot, 2nd and 3rd toes. No open wounds, no lesions, no maceration.     CBC Full  -  ( 2022 07:50 )  WBC Count : 8.67 K/uL  RBC Count : 5.06 M/uL  Hemoglobin : 14.5 g/dL  Hematocrit : 44.9 %  Platelet Count - Automated : 187 K/uL  Mean Cell Volume : 88.7 fl  Mean Cell Hemoglobin : 28.7 pg  Mean Cell Hemoglobin Concentration : 32.3 gm/dL  Auto Neutrophil # : x  Auto Lymphocyte # : x  Auto Monocyte # : x  Auto Eosinophil # : x  Auto Basophil # : x  Auto Neutrophil % : x  Auto Lymphocyte % : x  Auto Monocyte % : x  Auto Eosinophil % : x  Auto Basophil % : x      ----------CHEM PANEL----------    PT/INR - ( 2022 15:22 )   PT: 12.5 sec;   INR: 1.07 ratio         PTT - ( 2022 15:22 )  PTT:31.1 sec        Imaging: ----------

## 2022-06-16 NOTE — DIETITIAN INITIAL EVALUATION ADULT - PERTINENT LABORATORY DATA
06-16    140  |  104  |  18  ----------------------------<  129<H>  4.8   |  30  |  0.93    Ca    10.1      16 Jun 2022 07:50    TPro  7.2  /  Alb  3.6  /  TBili  0.8  /  DBili  x   /  AST  20  /  ALT  20  /  AlkPhos  92  06-14

## 2022-06-16 NOTE — PHYSICAL THERAPY INITIAL EVALUATION ADULT - PERTINENT HX OF CURRENT PROBLEM, REHAB EVAL
86 yo M presented due to falls. States ~2 days ago he slipped and fell. States he hurt his left foot and hit the back of head, Head CT: No acute intracranial injury or C-spine fracture. SUBDURAL HYGROMAS ALONG THE LEFT FRONTAL TEMPORAL CONVEXITY, LEFT POSTERIOR CRANIAL FOSSA MEASURING UP TO 1 CM IN THICKNESS W/ REGIONAL MASS EFFECT. CT(ankle) Mildly displaced fracture involves of the talar head. Nondisplaced fracture of the calcaneus. Nondisplaced fracture of the 2nd proximal phalanx base.

## 2022-06-16 NOTE — DIETITIAN INITIAL EVALUATION ADULT - NUTRITIONGOAL OUTCOME1
Pt to consume >75% meals/supplements daily to meet est energy/pro needs & facilitate gradual desirable weight gain.

## 2022-06-16 NOTE — PROGRESS NOTE ADULT - ASSESSMENT
Pt is an 87 y.o. M wit ha PMH of HTN, HLD, COPD, Depression, Hep C, Duodenal ulcer diease, Lung nodules who is found to have subdural hygromas on head CT. He is being admitted for further work-up and management of falls. CT foot with L talar, calcaneal and phalanx fracture. No surgical intervention per podiatry. UA positive, being treated with Rocephin.

## 2022-06-16 NOTE — PHYSICAL THERAPY INITIAL EVALUATION ADULT - ADDITIONAL COMMENTS
Pt lives 2nd floor of daughter's house w/ 13 steps/rail.  Pt report is primarily a household ambulator.  Per EMR, pt's daughter is disabled and would be unable to care for him

## 2022-06-16 NOTE — CHART NOTE - NSCHARTNOTEFT_GEN_A_CORE
Order received from PT to fit patient with camboot for left fracture of foot.  Pt sized and boot adjusted to fit.  Pt instructed to don and doff  Follow up if needed      Raul Whitehead [place]  [phone]

## 2022-06-16 NOTE — DIETITIAN INITIAL EVALUATION ADULT - ETIOLOGY
What Type Of Note Output Would You Prefer (Optional)?: Standard Output
How Severe Is Your Acne?: mild
Is This A New Presentation, Or A Follow-Up?: Acne
related to increased needs in setting of COPD

## 2022-06-16 NOTE — PROGRESS NOTE ADULT - ASSESSMENT
87 y.o. M wit ha PMH of HTN, HLD, COPD, Depression, Hep C, Duodenal ulcer diease, Lung nodules who presented to the ED with a CC of falls with Non-displaced fractures of talar head; Non-displaced anterior process of the calcaneus; Non-displaced fractures of the base of the proximal phalanx 2nd toe. Pt doing well.

## 2022-06-16 NOTE — DIETITIAN INITIAL EVALUATION ADULT - OTHER INFO
Pt is an 87 y.o. M wit ha PMH of HTN, HLD, COPD, Depression, Hep C, Duodenal ulcer diease, Lung nodules who is found to have subdural hygromas on head CT. He is being admitted for further work-up and management of falls. CT foot with L talar, calcaneal and phalanx fracture. No surgical intervention per podiatry.     Pt A+Ox4 during visit. Regular diet rx. Well tolerated for breakfast 6/16 consuming ~75% per pt. Usually good appetite/po intake pta; consumes 3 meals/day. Has meals on wheels. Lives with daughter.  States UBW 104lbs, height 64". Weight stable per pt. Bedscale weight obtained 46.6kg (103lbs) by this RD on 6/16. States "I can't gain weight." GI wdl. No use of oral nutritional supplements/vitamins/minerals pta. Agreeable to Ensure enlive BID. GI wdl, mild constipation. Last BM 6/14. Encourage po fluids/dietary fiber with stool softeners prn. Will provide prune juice.

## 2022-06-16 NOTE — PROGRESS NOTE ADULT - SUBJECTIVE AND OBJECTIVE BOX
Patient is a 87y old  Male who presents with a chief complaint of Fall (2022 11:32)      INTERVAL HPI/OVERNIGHT EVENTS: Patient seen and examined at bedside. Overnight pt had a fever Tmax of 103.3F, HR was 125. Pt was complaining of dysuria. Fever workup performed. CXR was negative. Patient is complaining of slight LLE pain with movement. Denies fevers, chills, headache, lightheadedness, chest pain, dyspnea, abdominal pain, n/v/d/c.    MEDICATIONS  (STANDING):  atorvastatin 10 milliGRAM(s) Oral at bedtime  budesonide  80 MICROgram(s)/formoterol 4.5 MICROgram(s) Inhaler 2 Puff(s) Inhalation two times a day  cefTRIAXone   IVPB 1000 milliGRAM(s) IV Intermittent every 24 hours  enoxaparin Injectable 40 milliGRAM(s) SubCutaneous every 24 hours  montelukast 10 milliGRAM(s) Oral daily  sertraline 25 milliGRAM(s) Oral daily  tiotropium 18 MICROgram(s) Capsule 1 Capsule(s) Inhalation daily    MEDICATIONS  (PRN):  acetaminophen     Tablet .. 650 milliGRAM(s) Oral every 6 hours PRN Temp greater or equal to 38C (100.4F), Mild Pain (1 - 3)  ALBUTerol    90 MICROgram(s) HFA Inhaler 2 Puff(s) Inhalation every 6 hours PRN Shortness of Breath and/or Wheezing      Allergies    No Known Allergies    Intolerances        REVIEW OF SYSTEMS:  CONSTITUTIONAL: No fever or chills  HEENT:  No headache, no sore throat  RESPIRATORY: No cough, wheezing, or shortness of breath  CARDIOVASCULAR: No chest pain, palpitations  GASTROINTESTINAL: No abd pain, nausea, vomiting, or diarrhea  GENITOURINARY: No dysuria, frequency, or hematuria  NEUROLOGICAL: no focal weakness or dizziness  MUSCULOSKELETAL: admits to LLE pain with movement     Vital Signs Last 24 Hrs  T(C): 37.2 (2022 11:49), Max: 39.6 (15 William 2022 20:02)  T(F): 98.9 (2022 11:49), Max: 103.3 (15 William 2022 20:02)  HR: 82 (2022 11:49) (78 - 125)  BP: 112/63 (2022 11:49) (112/63 - 155/78)  BP(mean): 99 (15 William 2022 20:02) (99 - 99)  RR: 18 (2022 11:49) (18 - 20)  SpO2: 92% (2022 11:49) (92% - 95%)    PHYSICAL EXAM:  GENERAL: NAD, pleasant   HEENT:  anicteric, moist mucous membranes  CHEST/LUNG:  CTA b/l, no rales, wheezes, or rhonchi  HEART:  RRR, S1, S2  ABDOMEN:  BS+, soft, nontender, nondistended  EXTREMITIES: LLE swollen (dec compared to yesterday), 2nd digit bruised  NERVOUS SYSTEM: answers questions and follows commands appropriately    LABS:                        14.5   8.67  )-----------( 187      ( 2022 07:50 )             44.9     CBC Full  -  ( 2022 07:50 )  WBC Count : 8.67 K/uL  Hemoglobin : 14.5 g/dL  Hematocrit : 44.9 %  Platelet Count - Automated : 187 K/uL  Mean Cell Volume : 88.7 fl  Mean Cell Hemoglobin : 28.7 pg  Mean Cell Hemoglobin Concentration : 32.3 gm/dL  Auto Neutrophil # : x  Auto Lymphocyte # : x  Auto Monocyte # : x  Auto Eosinophil # : x  Auto Basophil # : x  Auto Neutrophil % : x  Auto Lymphocyte % : x  Auto Monocyte % : x  Auto Eosinophil % : x  Auto Basophil % : x    2022 07:50    140    |  104    |  18     ----------------------------<  129    4.8     |  30     |  0.93     Ca    10.1       2022 07:50      PT/INR - ( 2022 15:22 )   PT: 12.5 sec;   INR: 1.07 ratio         PTT - ( 2022 15:22 )  PTT:31.1 sec  Urinalysis Basic - ( 2022 07:28 )    Color: Yellow / Appearance: very cloudy / S.020 / pH: x  Gluc: x / Ketone: Trace  / Bili: Negative / Urobili: Negative   Blood: x / Protein: 100 / Nitrite: Positive   Leuk Esterase: Moderate / RBC: 11-25 /HPF / WBC >50   Sq Epi: x / Non Sq Epi: Occasional / Bacteria: Many      CAPILLARY BLOOD GLUCOSE              RADIOLOGY & ADDITIONAL TESTS:  < from: Xray Chest 1 View- PORTABLE-Urgent (Xray Chest 1 View- PORTABLE-Urgent .) (06.15.22 @ 23:44) >  No acute radiographic findings and no change    < end of copied text >      Personally reviewed.     Consultant(s) Notes Reviewed:  [x] YES  [ ] NO

## 2022-06-16 NOTE — DIETITIAN INITIAL EVALUATION ADULT - PERTINENT MEDS FT
MEDICATIONS  (STANDING):  atorvastatin 10 milliGRAM(s) Oral at bedtime  budesonide  80 MICROgram(s)/formoterol 4.5 MICROgram(s) Inhaler 2 Puff(s) Inhalation two times a day  cefTRIAXone   IVPB 1000 milliGRAM(s) IV Intermittent every 24 hours  enoxaparin Injectable 40 milliGRAM(s) SubCutaneous every 24 hours  montelukast 10 milliGRAM(s) Oral daily  sertraline 25 milliGRAM(s) Oral daily  tiotropium 18 MICROgram(s) Capsule 1 Capsule(s) Inhalation daily    MEDICATIONS  (PRN):  acetaminophen     Tablet .. 650 milliGRAM(s) Oral every 6 hours PRN Temp greater or equal to 38C (100.4F), Mild Pain (1 - 3)  ALBUTerol    90 MICROgram(s) HFA Inhaler 2 Puff(s) Inhalation every 6 hours PRN Shortness of Breath and/or Wheezing

## 2022-06-16 NOTE — DIETITIAN INITIAL EVALUATION ADULT - PHYSICAL ASSESSMENT TRICEPS
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 \par Improving LASHELL. cont kegels, dc anticholinergics\par PSA in 3 mo\par  mild

## 2022-06-16 NOTE — DIETITIAN INITIAL EVALUATION ADULT - ENERGY INTAKE
Was the patient seen in the last year in this department? Yes    Does patient have an active prescription for medications requested? No     Received Request Via: Pharmacy  
Adequate (%)

## 2022-06-16 NOTE — CONSULT NOTE ADULT - SUBJECTIVE AND OBJECTIVE BOX
[facility]  INFECTIOUS DISEASES   [address] [place] [address]  Tel: [phone]     Fax: [phone]  ======================================================  MD Quique Marroquin Kaushal, MD Cho, Michelle, MD   ======================================================    [id number]  CATHERINE NGUYEN     CC: Fall   HPI:  86 yo man with PMH of HTN, COPD, HCV, depression and peptic ulcer was admitted after a fall and was found to have multiple fractures in left foot (2nd toe and talus and calcaneus).   He has no other complaint except for pain in left foot.   His labs showed that he has abnormal UA. He has no dysuria but admits to frequency. No recurrent UTIs, had one few months ago S enterobacter.  ID has been called for fever.     PAST MEDICAL & SURGICAL HISTORY:  Lung nodule, multiple  COPD (chronic obstructive pulmonary disease)  Depression  Emphysema  Duodenal ulcer disease  Hepatitis C  patients daughter states that she was told that the patient has hepitatis c  HLD (hyperlipidemia)  No significant past surgical history    Social Hx: no smoking, ETOH Or drugs     FAMILY HISTORY:  Family history of cerebral infarction (Father)  father  age 71    Allergies  No Known Allergies    Antibiotics:  MEDICATIONS  (STANDING):  atorvastatin 10 milliGRAM(s) Oral at bedtime  budesonide  80 MICROgram(s)/formoterol 4.5 MICROgram(s) Inhaler 2 Puff(s) Inhalation two times a day  cefTRIAXone   IVPB 1000 milliGRAM(s) IV Intermittent every 24 hours  enoxaparin Injectable 40 milliGRAM(s) SubCutaneous every 24 hours  montelukast 10 milliGRAM(s) Oral daily  sertraline 25 milliGRAM(s) Oral daily  tiotropium 18 MICROgram(s) Capsule 1 Capsule(s) Inhalation daily     REVIEW OF SYSTEMS:  CONSTITUTIONAL:  No Fever or chills  HEENT:  No diplopia or blurred vision.  No sore throat or runny nose.  CARDIOVASCULAR:  No chest pain or SOB.  RESPIRATORY:  No cough, shortness of breath, PND or orthopnea.  GASTROINTESTINAL:  No nausea, vomiting or diarrhea.  GENITOURINARY:  No dysuria, frequency or urgency. No Blood in urine  MUSCULOSKELETAL: Right foot pain   SKIN:  No change in skin, hair or nails.  NEUROLOGIC:  No paresthesias, fasciculations, seizures or weakness.  PSYCHIATRIC:  No disorder of thought or mood.  ENDOCRINE:  No heat or cold intolerance, polyuria or polydipsia.  HEMATOLOGICAL:  No easy bruising or bleeding.     Physical Exam:  Vital Signs Last 24 Hrs  T(C): 36.9 (2022 05:13), Max: 39.6 (15 William 2022 20:02)  T(F): 98.5 (2022 05:13), Max: 103.3 (15 William 2022 20:02)  HR: 90 (2022 10:47) (78 - 125)  BP: 114/64 (2022 10:47) (114/64 - 155/78)  BP(mean): 99 (15 William 2022 20:02) (99 - 99)  RR: 19 (2022 05:13) (18 - 20)  SpO2: 95% (2022 10:47) (92% - 95%)    GEN: NAD  HEENT: normocephalic and atraumatic. EOMI. PERRL.    NECK: Supple.  No lymphadenopathy   LUNGS: Clear to auscultation.  HEART: Regular rate and rhythm without murmur.  ABDOMEN: Soft, nontender, and nondistended.  Positive bowel sounds.    : No CVA tenderness  EXTREMITIES: Without any cyanosis, clubbing, rash, lesions or edema.  NEUROLOGIC: grossly intact.  PSYCHIATRIC: Appropriate affect .  SKIN: No ulceration or induration present.    Labs:      140  |  104  |  18  ----------------------------<  129<H>  4.8   |  30  |  0.93    Ca    10.1      2022 07:50    TPro  7.2  /  Alb  3.6  /  TBili  0.8  /  DBili  x   /  AST  20  /  ALT  20  /  AlkPhos  92                          14.5   8.67  )-----------( 187      ( 2022 07:50 )             44.9     PT/INR - ( 2022 15:22 )   PT: 12.5 sec;   INR: 1.07 ratio    PTT - ( 2022 15:22 )  PTT:31.1 sec  Urinalysis Basic - ( 2022 07:28 )    Color: Yellow / Appearance: very cloudy / S.020 / pH: x  Gluc: x / Ketone: Trace  / Bili: Negative / Urobili: Negative   Blood: x / Protein: 100 / Nitrite: Positive   Leuk Esterase: Moderate / RBC: 11-25 /HPF / WBC >50   Sq Epi: x / Non Sq Epi: Occasional / Bacteria: Many    LIVER FUNCTIONS - ( 2022 15:22 )  Alb: 3.6 g/dL / Pro: 7.2 g/dL / ALK PHOS: 92 U/L / ALT: 20 U/L / AST: 20 U/L / GGT: x           Procalcitonin, Serum: 0.14 ng/mL (06-15-22 @ 22:45)    COVID-19 PCR: NotDetec (22 @ 15:23)    All imaging and other data have been reviewed.  < from: Xray Chest 1 View- PORTABLE-Urgent (Xray Chest 1 View- PORTABLE-Urgent .) (06.15.22 @ 23:44) >  IMPRESSION:  No acute radiographic findings and no change    < from: CT Foot No Cont, Left (06.15.22 @ 09:05) >  IMPRESSION:  1. Mildly displaced cortical fracture involves the dorsum of the talar   head.  2. Nondisplaced fracture of the anterior process of the calcaneus with   nonosseous calcaneonavicular coalition.  3. Nondisplaced fracture of the 2nd proximal phalanx base.    Assessment and Plan:   86 yo man with PMH of HTN, COPD, HCV, depression and peptic ulcer was admitted after a fall and was found to have multiple fractures in left foot (2nd toe and talus and calcaneus).   His labs showed that he has abnormal UA + frequency, had a UTI few months ago S enterobacter.  ID has been called for fever that could be combination of UTI and fracture but usually reactive fever is not as high and simple cystitis shouldn't cause a high fever.   His fall could be 2' to sepsis so needs full work up.     Recommendations:    - Blood culture x 2   - Follow UC  - Podiatry/ortho consult for fracture   - Start Ceftriaxone 1gm daily until cultures are back    Thank you for courtesy of this consult.     Will follow.  Discussed with Dr. Diaz.     Reji Bull MD  Division of Infectious Diseases   Please call ID service at [phone] with any question.      55 minutes spent on total encounter assessing patient, examination, chart reivew, counseling and coordinating care by the attending physician/nurse/care manager.

## 2022-06-17 LAB
ANION GAP SERPL CALC-SCNC: 6 MMOL/L — SIGNIFICANT CHANGE UP (ref 5–17)
BUN SERPL-MCNC: 21 MG/DL — SIGNIFICANT CHANGE UP (ref 7–23)
CALCIUM SERPL-MCNC: 9.2 MG/DL — SIGNIFICANT CHANGE UP (ref 8.5–10.1)
CHLORIDE SERPL-SCNC: 103 MMOL/L — SIGNIFICANT CHANGE UP (ref 96–108)
CO2 SERPL-SCNC: 31 MMOL/L — SIGNIFICANT CHANGE UP (ref 22–31)
CREAT SERPL-MCNC: 0.99 MG/DL — SIGNIFICANT CHANGE UP (ref 0.5–1.3)
CULTURE RESULTS: SIGNIFICANT CHANGE UP
EGFR: 74 ML/MIN/1.73M2 — SIGNIFICANT CHANGE UP
GLUCOSE SERPL-MCNC: 128 MG/DL — HIGH (ref 70–99)
HCT VFR BLD CALC: 41.1 % — SIGNIFICANT CHANGE UP (ref 39–50)
HGB BLD-MCNC: 13.3 G/DL — SIGNIFICANT CHANGE UP (ref 13–17)
MCHC RBC-ENTMCNC: 28.7 PG — SIGNIFICANT CHANGE UP (ref 27–34)
MCHC RBC-ENTMCNC: 32.4 GM/DL — SIGNIFICANT CHANGE UP (ref 32–36)
MCV RBC AUTO: 88.8 FL — SIGNIFICANT CHANGE UP (ref 80–100)
NRBC # BLD: 0 /100 WBCS — SIGNIFICANT CHANGE UP (ref 0–0)
PLATELET # BLD AUTO: 172 K/UL — SIGNIFICANT CHANGE UP (ref 150–400)
POTASSIUM SERPL-MCNC: 4.2 MMOL/L — SIGNIFICANT CHANGE UP (ref 3.5–5.3)
POTASSIUM SERPL-SCNC: 4.2 MMOL/L — SIGNIFICANT CHANGE UP (ref 3.5–5.3)
RBC # BLD: 4.63 M/UL — SIGNIFICANT CHANGE UP (ref 4.2–5.8)
RBC # FLD: 14.4 % — SIGNIFICANT CHANGE UP (ref 10.3–14.5)
SARS-COV-2 RNA SPEC QL NAA+PROBE: SIGNIFICANT CHANGE UP
SODIUM SERPL-SCNC: 140 MMOL/L — SIGNIFICANT CHANGE UP (ref 135–145)
SPECIMEN SOURCE: SIGNIFICANT CHANGE UP
WBC # BLD: 7.85 K/UL — SIGNIFICANT CHANGE UP (ref 3.8–10.5)
WBC # FLD AUTO: 7.85 K/UL — SIGNIFICANT CHANGE UP (ref 3.8–10.5)

## 2022-06-17 PROCEDURE — 99232 SBSQ HOSP IP/OBS MODERATE 35: CPT

## 2022-06-17 PROCEDURE — 99233 SBSQ HOSP IP/OBS HIGH 50: CPT

## 2022-06-17 PROCEDURE — 99233 SBSQ HOSP IP/OBS HIGH 50: CPT | Mod: GC

## 2022-06-17 RX ORDER — CX-024414 0.2 MG/ML
0.25 INJECTION, SUSPENSION INTRAMUSCULAR ONCE
Refills: 0 | Status: COMPLETED | OUTPATIENT
Start: 2022-06-17 | End: 2022-06-17

## 2022-06-17 RX ADMIN — ENOXAPARIN SODIUM 40 MILLIGRAM(S): 100 INJECTION SUBCUTANEOUS at 17:21

## 2022-06-17 RX ADMIN — CEFTRIAXONE 100 MILLIGRAM(S): 500 INJECTION, POWDER, FOR SOLUTION INTRAMUSCULAR; INTRAVENOUS at 12:19

## 2022-06-17 RX ADMIN — Medication 1 DROP(S): at 12:19

## 2022-06-17 RX ADMIN — BUDESONIDE AND FORMOTEROL FUMARATE DIHYDRATE 2 PUFF(S): 160; 4.5 AEROSOL RESPIRATORY (INHALATION) at 08:17

## 2022-06-17 RX ADMIN — TIOTROPIUM BROMIDE 1 CAPSULE(S): 18 CAPSULE ORAL; RESPIRATORY (INHALATION) at 08:16

## 2022-06-17 RX ADMIN — ATORVASTATIN CALCIUM 10 MILLIGRAM(S): 80 TABLET, FILM COATED ORAL at 21:37

## 2022-06-17 RX ADMIN — MONTELUKAST 10 MILLIGRAM(S): 4 TABLET, CHEWABLE ORAL at 12:19

## 2022-06-17 RX ADMIN — SERTRALINE 25 MILLIGRAM(S): 25 TABLET, FILM COATED ORAL at 12:20

## 2022-06-17 RX ADMIN — BUDESONIDE AND FORMOTEROL FUMARATE DIHYDRATE 2 PUFF(S): 160; 4.5 AEROSOL RESPIRATORY (INHALATION) at 18:36

## 2022-06-17 RX ADMIN — CX-024414 0.25 MILLILITER(S): 0.2 INJECTION, SUSPENSION INTRAMUSCULAR at 15:07

## 2022-06-17 NOTE — PROGRESS NOTE ADULT - SUBJECTIVE AND OBJECTIVE BOX
HPI:  Pt is an 87 y.o. M wit ha PMH of HTN, HLD, COPD, Depression, Hep C, Duodenal ulcer diease, Lung nodules who presented to the ED with a CC of falls. Pt seen at bedside for Non-displaced fractures of talar head; Non-displaced anterior process of the calcaneus; Non-displaced fractures of the base of the proximal phalanx 2nd toe. Pt doing well and in good spirits. Resting comfortably.       REVIEW OF SYSTEMS    PAST MEDICAL & SURGICAL HISTORY:  Lung nodule, multiple      COPD (chronic obstructive pulmonary disease)      Depression      Emphysema      Duodenal ulcer disease      Hepatitis C  patients daughter states that she was told that the patient has hepitatis c      HLD (hyperlipidemia)      No significant past surgical history          Allergies    No Known Allergies    Intolerances      MEDICATIONS  (STANDING):  atorvastatin 10 milliGRAM(s) Oral at bedtime  budesonide  80 MICROgram(s)/formoterol 4.5 MICROgram(s) Inhaler 2 Puff(s) Inhalation two times a day  cefTRIAXone   IVPB 1000 milliGRAM(s) IV Intermittent every 24 hours  enoxaparin Injectable 40 milliGRAM(s) SubCutaneous every 24 hours  montelukast 10 milliGRAM(s) Oral daily  sertraline 25 milliGRAM(s) Oral daily  tiotropium 18 MICROgram(s) Capsule 1 Capsule(s) Inhalation daily    MEDICATIONS  (PRN):  acetaminophen     Tablet .. 650 milliGRAM(s) Oral every 6 hours PRN Temp greater or equal to 38C (100.4F), Mild Pain (1 - 3)  ALBUTerol    90 MICROgram(s) HFA Inhaler 2 Puff(s) Inhalation every 6 hours PRN Shortness of Breath and/or Wheezing  artificial tears (preservative free) Ophthalmic Solution 1 Drop(s) Both EYES two times a day PRN Dry Eyes      Social History:  Denies use of etoh, tobacco and drug use.  Former smoker quit 15 years ago, >50 pack year history  lives home with daughter  uses cane to ambulate (2022 17:51)      FAMILY HISTORY:  Family history of cerebral infarction (Father)  father  age 71    ICU Vital Signs Last 24 Hrs  T(C): 36.6 (2022 10:54), Max: 37 (2022 20:13)  T(F): 97.9 (2022 10:54), Max: 98.6 (2022 20:13)  HR: 86 (2022 13:30) (78 - 103)  BP: 129/79 (2022 13:30) (118/72 - 143/76)  BP(mean): --  ABP: --  ABP(mean): --  RR: 18 (2022 10:54) (18 - 18)  SpO2: 90% (2022 13:30) (90% - 92%)      PHYSICAL EXAM:  Vascular: DP & PT palpable bilaterally, Capillary refill 3 seconds, Digital hair present bilaterally  Neurological: Light touch sensation intact bilaterally  Musculoskeletal: Tenderness on palpation to the calcaneus and 5/5 strength in all quadrants bilaterally, AJ & STJ ROM intact  Dermatological: Ecchymosis noted to the medial and lateral calcaneus; dorsal forefoot, 2nd and 3rd toes. No open wounds, no lesions, no maceration.     CBC Full  -  ( 2022 08:40 )  WBC Count : 7.85 K/uL  RBC Count : 4.63 M/uL  Hemoglobin : 13.3 g/dL  Hematocrit : 41.1 %  Platelet Count - Automated : 172 K/uL  Mean Cell Volume : 88.8 fl  Mean Cell Hemoglobin : 28.7 pg  Mean Cell Hemoglobin Concentration : 32.4 gm/dL  Auto Neutrophil # : x  Auto Lymphocyte # : x  Auto Monocyte # : x  Auto Eosinophil # : x  Auto Basophil # : x  Auto Neutrophil % : x  Auto Lymphocyte % : x  Auto Monocyte % : x  Auto Eosinophil % : x  Auto Basophil % : x

## 2022-06-17 NOTE — PROGRESS NOTE ADULT - SUBJECTIVE AND OBJECTIVE BOX
[facility]  INFECTIOUS DISEASES   [address] [place] [address]  Tel: [phone]     Fax: [phone]  ======================================================  MD Quique Marroquin Kaushal, MD Cho, Michelle, MD   ======================================================    [id number]  CARMINE LOIACANO     Follow up; UTI and left foot fracture     Doing better, less pain in left foot. No more fever.  No urinary symptoms.     PAST MEDICAL & SURGICAL HISTORY:  Lung nodule, multiple  COPD (chronic obstructive pulmonary disease)  Depression  Emphysema  Duodenal ulcer disease  Hepatitis C  patients daughter states that she was told that the patient has hepitatis c  HLD (hyperlipidemia)  No significant past surgical history    Social Hx: no smoking, ETOH Or drugs     FAMILY HISTORY:  Family history of cerebral infarction (Father)  father  age 71    Allergies  No Known Allergies    Antibiotics:  MEDICATIONS  (STANDING):  atorvastatin 10 milliGRAM(s) Oral at bedtime  budesonide  80 MICROgram(s)/formoterol 4.5 MICROgram(s) Inhaler 2 Puff(s) Inhalation two times a day  cefTRIAXone   IVPB 1000 milliGRAM(s) IV Intermittent every 24 hours  enoxaparin Injectable 40 milliGRAM(s) SubCutaneous every 24 hours  montelukast 10 milliGRAM(s) Oral daily  sertraline 25 milliGRAM(s) Oral daily  tiotropium 18 MICROgram(s) Capsule 1 Capsule(s) Inhalation daily     REVIEW OF SYSTEMS:  CONSTITUTIONAL:  No Fever or chills  HEENT:  No diplopia or blurred vision.  No sore throat or runny nose.  CARDIOVASCULAR:  No chest pain or SOB.  RESPIRATORY:  No cough, shortness of breath, PND or orthopnea.  GASTROINTESTINAL:  No nausea, vomiting or diarrhea.  GENITOURINARY:  No dysuria, frequency or urgency. No Blood in urine  MUSCULOSKELETAL: Right foot pain   SKIN:  No change in skin, hair or nails.  NEUROLOGIC:  No paresthesias, fasciculations, seizures or weakness.  PSYCHIATRIC:  No disorder of thought or mood.  ENDOCRINE:  No heat or cold intolerance, polyuria or polydipsia.  HEMATOLOGICAL:  No easy bruising or bleeding.     Physical Exam:  Vital Signs Last 24 Hrs  T(C): 36.6 (2022 10:54), Max: 37 (2022 20:13)  T(F): 97.9 (2022 10:54), Max: 98.6 (2022 20:13)  HR: 80 (2022 10:54) (78 - 103)  BP: 118/72 (2022 10:54) (118/72 - 143/76)  BP(mean): --  RR: 18 (2022 10:54) (18 - 18)  SpO2: 92% (2022 10:54) (90% - 92%)  GEN: NAD  HEENT: normocephalic and atraumatic. EOMI. PERRL.    NECK: Supple.  No lymphadenopathy   LUNGS: Clear to auscultation.  HEART: Regular rate and rhythm without murmur.  ABDOMEN: Soft, nontender, and nondistended.  Positive bowel sounds.    : No CVA tenderness  EXTREMITIES: Without any cyanosis, clubbing, rash, lesions or edema.  NEUROLOGIC: grossly intact.  PSYCHIATRIC: Appropriate affect .  SKIN: No ulceration or induration present.      Labs:                        13.3   7.85  )-----------( 172      ( 2022 08:40 )             41.1         140  |  103  |  21  ----------------------------<  128<H>  4.2   |  31  |  0.99    Ca    9.2      2022 08:40    Culture - Urine (collected 22 @ 12:52)  Source: Clean Catch Clean Catch (Midstream)  Final Report (22 @ 12:05):    >=3 organisms. Probable collection contamination.    Culture - Blood (collected 22 @ 03:38)  Source: .Blood Blood-Peripheral    Culture - Blood (collected 22 @ 03:38)  Source: .Blood Blood-Peripheral    WBC Count: 7.85 K/uL (22 @ 08:40)  WBC Count: 8.67 K/uL (22 @ 07:50)  WBC Count: 10.85 K/uL (06-15-22 @ 09:20)  WBC Count: 10.70 K/uL (22 @ 15:22)    Creatinine, Serum: 0.99 mg/dL (22 @ 08:40)  Creatinine, Serum: 0.93 mg/dL (22 @ 07:50)  Creatinine, Serum: 1.00 mg/dL (06-15-22 @ 09:20)  Creatinine, Serum: 1.00 mg/dL (22 @ 15:22)    Procalcitonin, Serum: 0.14 ng/mL (06-15-22 @ 22:45)     COVID-19 PCR: NotDetec (22 @ 15:23)    All imaging and other data have been reviewed.  < from: Xray Chest 1 View- PORTABLE-Urgent (Xray Chest 1 View- PORTABLE-Urgent .) (06.15.22 @ 23:44) >  IMPRESSION:  No acute radiographic findings and no change    < from: CT Foot No Cont, Left (06.15.22 @ 09:05) >  IMPRESSION:  1. Mildly displaced cortical fracture involves the dorsum of the talar   head.  2. Nondisplaced fracture of the anterior process of the calcaneus with   nonosseous calcaneonavicular coalition.  3. Nondisplaced fracture of the 2nd proximal phalanx base.    Assessment and Plan:   86 yo man with PMH of HTN, COPD, HCV, depression and peptic ulcer was admitted after a fall and was found to have multiple fractures in left foot (2nd toe and talus and calcaneus).   His labs showed that he has abnormal UA + frequency, had a UTI few months ago S enterobacter.  ID has been called for fever that could be combination of UTI and fracture but usually reactive fever is not as high and simple cystitis shouldn't cause a high fever.     Recommendations:    - Blood culture x 2 NGTD  - UC with multiple bacteria but could be real   - Podiatry consult note  - Can stop ceftriaxone and start vantin 200mg q12 to complete total 5days   - For possible rehab transfer, COVID booster in required, can be given before discharge    Will follow.  Discussed with Dr. Diaz.     Reji Bull MD  Division of Infectious Diseases   Please call ID service at [phone] with any question.      35 minutes spent on total encounter assessing patient, examination, chart reivew, counseling and coordinating care by the attending physician/nurse/care manager.

## 2022-06-17 NOTE — PROGRESS NOTE ADULT - SUBJECTIVE AND OBJECTIVE BOX
Patient is a 87y old  Male who presents with a chief complaint of Fall (2022 17:14)      Subjective:  INTERVAL HPI/OVERNIGHT EVENTS: Patient seen and examined at bedside. No overnight events occurred. Patient has no complaints at this time. Denies fevers, chills, headache, lightheadedness, chest pain, dyspnea, abdominal pain, n/v/d/c.    MEDICATIONS  (STANDING):  atorvastatin 10 milliGRAM(s) Oral at bedtime  budesonide  80 MICROgram(s)/formoterol 4.5 MICROgram(s) Inhaler 2 Puff(s) Inhalation two times a day  cefTRIAXone   IVPB 1000 milliGRAM(s) IV Intermittent every 24 hours  enoxaparin Injectable 40 milliGRAM(s) SubCutaneous every 24 hours  montelukast 10 milliGRAM(s) Oral daily  sertraline 25 milliGRAM(s) Oral daily  tiotropium 18 MICROgram(s) Capsule 1 Capsule(s) Inhalation daily    MEDICATIONS  (PRN):  acetaminophen     Tablet .. 650 milliGRAM(s) Oral every 6 hours PRN Temp greater or equal to 38C (100.4F), Mild Pain (1 - 3)  ALBUTerol    90 MICROgram(s) HFA Inhaler 2 Puff(s) Inhalation every 6 hours PRN Shortness of Breath and/or Wheezing  artificial tears (preservative free) Ophthalmic Solution 1 Drop(s) Both EYES two times a day PRN Dry Eyes      Allergies    No Known Allergies    Intolerances        REVIEW OF SYSTEMS:  CONSTITUTIONAL: No fever or chills  HEENT:  No headache, no sore throat  RESPIRATORY: No cough, wheezing, or shortness of breath  CARDIOVASCULAR: No chest pain, palpitations  GASTROINTESTINAL: No abd pain, nausea, vomiting, or diarrhea  GENITOURINARY: No dysuria, frequency, or hematuria  NEUROLOGICAL: no focal weakness or dizziness  MUSCULOSKELETAL: admits LLE pain with movement     Objective:  Vital Signs Last 24 Hrs  T(C): 36.6 (2022 10:54), Max: 37.2 (2022 11:49)  T(F): 97.9 (2022 10:54), Max: 98.9 (2022 11:49)  HR: 80 (2022 10:54) (78 - 103)  BP: 118/72 (2022 10:54) (112/63 - 143/76)  BP(mean): --  RR: 18 (2022 10:54) (18 - 18)  SpO2: 92% (2022 10:54) (90% - 92%)    PHYSICAL EXAM:  GENERAL: NAD, pleasant   HEENT:  anicteric, moist mucous membranes  CHEST/LUNG:  CTA b/l, no rales, wheezes, or rhonchi  HEART:  RRR, S1, S2  ABDOMEN:  BS+, soft, nontender, nondistended  EXTREMITIES: LLE swollen (dec compared to yesterday), 2nd digit bruised  NERVOUS SYSTEM: answers questions and follows commands appropriately      LABS:                        13.3   7.85  )-----------( 172      ( 2022 08:40 )             41.1     CBC Full  -  ( 2022 08:40 )  WBC Count : 7.85 K/uL  Hemoglobin : 13.3 g/dL  Hematocrit : 41.1 %  Platelet Count - Automated : 172 K/uL  Mean Cell Volume : 88.8 fl  Mean Cell Hemoglobin : 28.7 pg  Mean Cell Hemoglobin Concentration : 32.4 gm/dL  Auto Neutrophil # : x  Auto Lymphocyte # : x  Auto Monocyte # : x  Auto Eosinophil # : x  Auto Basophil # : x  Auto Neutrophil % : x  Auto Lymphocyte % : x  Auto Monocyte % : x  Auto Eosinophil % : x  Auto Basophil % : x    2022 08:40    140    |  103    |  21     ----------------------------<  128    4.2     |  31     |  0.99     Ca    9.2        2022 08:40        Urinalysis Basic - ( 2022 07:28 )    Color: Yellow / Appearance: very cloudy / S.020 / pH: x  Gluc: x / Ketone: Trace  / Bili: Negative / Urobili: Negative   Blood: x / Protein: 100 / Nitrite: Positive   Leuk Esterase: Moderate / RBC: 11-25 /HPF / WBC >50   Sq Epi: x / Non Sq Epi: Occasional / Bacteria: Many      CAPILLARY BLOOD GLUCOSE            Culture - Blood (collected 22 @ 03:38)  Source: .Blood Blood-Peripheral  Preliminary Report (22 @ 04:01):    No growth to date.    Culture - Blood (collected 22 @ 03:38)  Source: .Blood Blood-Peripheral  Preliminary Report (22 @ 04:01):    No growth to date.        RADIOLOGY & ADDITIONAL TESTS:    Personally reviewed.     Consultant(s) Notes Reviewed:  [x] YES  [ ] NO

## 2022-06-18 ENCOUNTER — TRANSCRIPTION ENCOUNTER (OUTPATIENT)
Age: 87
End: 2022-06-18

## 2022-06-18 LAB
ANION GAP SERPL CALC-SCNC: 6 MMOL/L — SIGNIFICANT CHANGE UP (ref 5–17)
BASOPHILS # BLD AUTO: 0.03 K/UL — SIGNIFICANT CHANGE UP (ref 0–0.2)
BASOPHILS NFR BLD AUTO: 0.4 % — SIGNIFICANT CHANGE UP (ref 0–2)
BUN SERPL-MCNC: 25 MG/DL — HIGH (ref 7–23)
CALCIUM SERPL-MCNC: 9.3 MG/DL — SIGNIFICANT CHANGE UP (ref 8.5–10.1)
CHLORIDE SERPL-SCNC: 105 MMOL/L — SIGNIFICANT CHANGE UP (ref 96–108)
CO2 SERPL-SCNC: 30 MMOL/L — SIGNIFICANT CHANGE UP (ref 22–31)
CREAT SERPL-MCNC: 0.9 MG/DL — SIGNIFICANT CHANGE UP (ref 0.5–1.3)
EGFR: 83 ML/MIN/1.73M2 — SIGNIFICANT CHANGE UP
EOSINOPHIL # BLD AUTO: 0.07 K/UL — SIGNIFICANT CHANGE UP (ref 0–0.5)
EOSINOPHIL NFR BLD AUTO: 0.9 % — SIGNIFICANT CHANGE UP (ref 0–6)
GLUCOSE SERPL-MCNC: 139 MG/DL — HIGH (ref 70–99)
HCT VFR BLD CALC: 40.1 % — SIGNIFICANT CHANGE UP (ref 39–50)
HGB BLD-MCNC: 13 G/DL — SIGNIFICANT CHANGE UP (ref 13–17)
IMM GRANULOCYTES NFR BLD AUTO: 0.6 % — SIGNIFICANT CHANGE UP (ref 0–1.5)
LYMPHOCYTES # BLD AUTO: 1.01 K/UL — SIGNIFICANT CHANGE UP (ref 1–3.3)
LYMPHOCYTES # BLD AUTO: 12.4 % — LOW (ref 13–44)
MCHC RBC-ENTMCNC: 28.9 PG — SIGNIFICANT CHANGE UP (ref 27–34)
MCHC RBC-ENTMCNC: 32.4 GM/DL — SIGNIFICANT CHANGE UP (ref 32–36)
MCV RBC AUTO: 89.1 FL — SIGNIFICANT CHANGE UP (ref 80–100)
MONOCYTES # BLD AUTO: 1.85 K/UL — HIGH (ref 0–0.9)
MONOCYTES NFR BLD AUTO: 22.7 % — HIGH (ref 2–14)
NEUTROPHILS # BLD AUTO: 5.14 K/UL — SIGNIFICANT CHANGE UP (ref 1.8–7.4)
NEUTROPHILS NFR BLD AUTO: 63 % — SIGNIFICANT CHANGE UP (ref 43–77)
NRBC # BLD: 0 /100 WBCS — SIGNIFICANT CHANGE UP (ref 0–0)
PLATELET # BLD AUTO: 189 K/UL — SIGNIFICANT CHANGE UP (ref 150–400)
POTASSIUM SERPL-MCNC: 4.1 MMOL/L — SIGNIFICANT CHANGE UP (ref 3.5–5.3)
POTASSIUM SERPL-SCNC: 4.1 MMOL/L — SIGNIFICANT CHANGE UP (ref 3.5–5.3)
RBC # BLD: 4.5 M/UL — SIGNIFICANT CHANGE UP (ref 4.2–5.8)
RBC # FLD: 14.5 % — SIGNIFICANT CHANGE UP (ref 10.3–14.5)
SODIUM SERPL-SCNC: 141 MMOL/L — SIGNIFICANT CHANGE UP (ref 135–145)
WBC # BLD: 8.15 K/UL — SIGNIFICANT CHANGE UP (ref 3.8–10.5)
WBC # FLD AUTO: 8.15 K/UL — SIGNIFICANT CHANGE UP (ref 3.8–10.5)

## 2022-06-18 PROCEDURE — 99233 SBSQ HOSP IP/OBS HIGH 50: CPT

## 2022-06-18 PROCEDURE — 99232 SBSQ HOSP IP/OBS MODERATE 35: CPT | Mod: GC

## 2022-06-18 RX ORDER — CEFPODOXIME PROXETIL 100 MG
200 TABLET ORAL EVERY 12 HOURS
Refills: 0 | Status: COMPLETED | OUTPATIENT
Start: 2022-06-18 | End: 2022-06-20

## 2022-06-18 RX ORDER — ACETAMINOPHEN 500 MG
2 TABLET ORAL
Qty: 0 | Refills: 0 | DISCHARGE
Start: 2022-06-18

## 2022-06-18 RX ORDER — ATORVASTATIN CALCIUM 80 MG/1
1 TABLET, FILM COATED ORAL
Qty: 0 | Refills: 0 | DISCHARGE

## 2022-06-18 RX ORDER — MONTELUKAST 4 MG/1
1 TABLET, CHEWABLE ORAL
Qty: 0 | Refills: 0 | DISCHARGE

## 2022-06-18 RX ORDER — FLUTICASONE FUROATE, UMECLIDINIUM BROMIDE AND VILANTEROL TRIFENATATE 200; 62.5; 25 UG/1; UG/1; UG/1
1 POWDER RESPIRATORY (INHALATION)
Qty: 0 | Refills: 0 | DISCHARGE

## 2022-06-18 RX ORDER — ENOXAPARIN SODIUM 100 MG/ML
40 INJECTION SUBCUTANEOUS
Qty: 0 | Refills: 0 | DISCHARGE
Start: 2022-06-18

## 2022-06-18 RX ORDER — MONTELUKAST 4 MG/1
1 TABLET, CHEWABLE ORAL
Qty: 0 | Refills: 0 | DISCHARGE
Start: 2022-06-18

## 2022-06-18 RX ORDER — POLYETHYLENE GLYCOL 3350 17 G/17G
17 POWDER, FOR SOLUTION ORAL DAILY
Refills: 0 | Status: DISCONTINUED | OUTPATIENT
Start: 2022-06-18 | End: 2022-06-20

## 2022-06-18 RX ORDER — SERTRALINE 25 MG/1
1 TABLET, FILM COATED ORAL
Qty: 0 | Refills: 0 | DISCHARGE
Start: 2022-06-18

## 2022-06-18 RX ORDER — BUDESONIDE AND FORMOTEROL FUMARATE DIHYDRATE 160; 4.5 UG/1; UG/1
2 AEROSOL RESPIRATORY (INHALATION)
Qty: 0 | Refills: 0 | DISCHARGE
Start: 2022-06-18

## 2022-06-18 RX ORDER — ATORVASTATIN CALCIUM 80 MG/1
1 TABLET, FILM COATED ORAL
Qty: 0 | Refills: 0 | DISCHARGE
Start: 2022-06-18

## 2022-06-18 RX ORDER — SENNA PLUS 8.6 MG/1
2 TABLET ORAL AT BEDTIME
Refills: 0 | Status: DISCONTINUED | OUTPATIENT
Start: 2022-06-18 | End: 2022-06-20

## 2022-06-18 RX ORDER — TIOTROPIUM BROMIDE 18 UG/1
1 CAPSULE ORAL; RESPIRATORY (INHALATION)
Qty: 0 | Refills: 0 | DISCHARGE
Start: 2022-06-18

## 2022-06-18 RX ORDER — CEFPODOXIME PROXETIL 100 MG
1 TABLET ORAL
Qty: 0 | Refills: 0 | DISCHARGE
Start: 2022-06-18

## 2022-06-18 RX ORDER — ALBUTEROL 90 UG/1
2 AEROSOL, METERED ORAL
Qty: 0 | Refills: 0 | DISCHARGE
Start: 2022-06-18

## 2022-06-18 RX ADMIN — Medication 200 MILLIGRAM(S): at 17:04

## 2022-06-18 RX ADMIN — CEFTRIAXONE 100 MILLIGRAM(S): 500 INJECTION, POWDER, FOR SOLUTION INTRAMUSCULAR; INTRAVENOUS at 11:11

## 2022-06-18 RX ADMIN — SENNA PLUS 2 TABLET(S): 8.6 TABLET ORAL at 21:05

## 2022-06-18 RX ADMIN — ATORVASTATIN CALCIUM 10 MILLIGRAM(S): 80 TABLET, FILM COATED ORAL at 21:05

## 2022-06-18 RX ADMIN — MONTELUKAST 10 MILLIGRAM(S): 4 TABLET, CHEWABLE ORAL at 11:11

## 2022-06-18 RX ADMIN — TIOTROPIUM BROMIDE 1 CAPSULE(S): 18 CAPSULE ORAL; RESPIRATORY (INHALATION) at 06:10

## 2022-06-18 RX ADMIN — ENOXAPARIN SODIUM 40 MILLIGRAM(S): 100 INJECTION SUBCUTANEOUS at 17:04

## 2022-06-18 RX ADMIN — BUDESONIDE AND FORMOTEROL FUMARATE DIHYDRATE 2 PUFF(S): 160; 4.5 AEROSOL RESPIRATORY (INHALATION) at 17:05

## 2022-06-18 RX ADMIN — SERTRALINE 25 MILLIGRAM(S): 25 TABLET, FILM COATED ORAL at 11:12

## 2022-06-18 RX ADMIN — BUDESONIDE AND FORMOTEROL FUMARATE DIHYDRATE 2 PUFF(S): 160; 4.5 AEROSOL RESPIRATORY (INHALATION) at 06:11

## 2022-06-18 NOTE — PROGRESS NOTE ADULT - SUBJECTIVE AND OBJECTIVE BOX
Neurology follow up note  COVERING DR OXANA [id number]      Interval History:    Patient feels ok no new complaints.    MEDICATIONS    acetaminophen     Tablet .. 650 milliGRAM(s) Oral every 6 hours PRN  ALBUTerol    90 MICROgram(s) HFA Inhaler 2 Puff(s) Inhalation every 6 hours PRN  artificial tears (preservative free) Ophthalmic Solution 1 Drop(s) Both EYES two times a day PRN  atorvastatin 10 milliGRAM(s) Oral at bedtime  budesonide  80 MICROgram(s)/formoterol 4.5 MICROgram(s) Inhaler 2 Puff(s) Inhalation two times a day  cefTRIAXone   IVPB 1000 milliGRAM(s) IV Intermittent every 24 hours  enoxaparin Injectable 40 milliGRAM(s) SubCutaneous every 24 hours  montelukast 10 milliGRAM(s) Oral daily  sertraline 25 milliGRAM(s) Oral daily  tiotropium 18 MICROgram(s) Capsule 1 Capsule(s) Inhalation daily      Allergies    No Known Allergies    Intolerances            Vital Signs Last 24 Hrs  T(C): 36.9 (2022 04:53), Max: 37.4 (2022 20:21)  T(F): 98.4 (2022 04:53), Max: 99.3 (2022 20:21)  HR: 75 (2022 04:53) (75 - 86)  BP: 119/73 (2022 04:53) (118/72 - 129/79)  BP(mean): --  RR: 18 (2022 04:53) (18 - 19)  SpO2: 92% (2022 04:53) (90% - 92%)      REVIEW OF SYSTEMS: Non Verbal  Limit or unable to obtain secondary to patient's poor mental status.    Constitutional: No fever, chills, fatigue, weakness  Eyes: no eye pain, visual disturbances, or discharge  ENT:  No difficulty hearing, tinnitus, vertigo; No sinus or throat pain  Neck: No pain or stiffness  Respiratory: No cough, dyspnea, wheezing   Cardiovascular: No chest pain, palpitations,   Gastrointestinal: No abdominal or epigastric pain. No nausea, vomiting  No diarrhea or constipation.   Genitourinary: No dysuria, frequency, hematuria or incontinence  Neurological: No headaches, lightheadedness, vertigo, numbness or tremors  Psychiatric: No depression, anxiety, mood swings or difficulty sleeping  Musculoskeletal: No joint pain or swelling; No muscle, back or extremity pain  Skin: No itching, burning, rashes or lesions   Lymph Nodes: No enlarged glands  Endocrine: No heat or cold intolerance; No hair loss   Allergy and Immunologic: No hives or eczema    On Neurological Examination:    Mental Status - Patient is alert, awake, oriented X3. Confused Dementia Lethargic Unresponsive  .       Follow simple commands  Follow complex commands  Does not follow commands    Speech -   Fluent    Dysarthria  Aphasia   Non Verbal                         Cranial Nerves - Pupils 3 mm equal and reactive to light,   extraocular eye movements intact.   smile symmetric  intact bilateral NLF    Motor Exam -   Right upper  Left upper  Right lower  Left lower     With stimuli positive movement of all 4 extremities    Muscle tone - is normal all over.  No asymmetry is seen.      Sensory    Bilateral intact to light touch    Gait -  normal  ataxia     GENERAL Exam: Nontoxic , No Acute Distress   	  HEENT:  normocephalic, atraumatic  		  LUNGS: Clear bilaterally  No Wheeze  Decreased bilaterally  	  HEART: Normal S1S2   No murmur RRR        	  GI/ ABDOMEN:  Soft  Non tender    EXTREMITIES:   No Edema  No Clubbing  No Cyanosis     MUSCULOSKELETAL: Normal Range of Motion  	   SKIN: Normal  No Ecchymosis               LABS:  CBC Full  -  ( 2022 08:40 )  WBC Count : 7.85 K/uL  RBC Count : 4.63 M/uL  Hemoglobin : 13.3 g/dL  Hematocrit : 41.1 %  Platelet Count - Automated : 172 K/uL  Mean Cell Volume : 88.8 fl  Mean Cell Hemoglobin : 28.7 pg  Mean Cell Hemoglobin Concentration : 32.4 gm/dL  Auto Neutrophil # : x  Auto Lymphocyte # : x  Auto Monocyte # : x  Auto Eosinophil # : x  Auto Basophil # : x  Auto Neutrophil % : x  Auto Lymphocyte % : x  Auto Monocyte % : x  Auto Eosinophil % : x  Auto Basophil % : x    Urinalysis Basic - ( 2022 07:28 )    Color: Yellow / Appearance: very cloudy / S.020 / pH: x  Gluc: x / Ketone: Trace  / Bili: Negative / Urobili: Negative   Blood: x / Protein: 100 / Nitrite: Positive   Leuk Esterase: Moderate / RBC: 11-25 /HPF / WBC >50   Sq Epi: x / Non Sq Epi: Occasional / Bacteria: Many      -    140  |  103  |  21  ----------------------------<  128<H>  4.2   |  31  |  0.99    Ca    9.2      2022 08:40      Hemoglobin A1C:       Vitamin B12         RADIOLOGY    ASSESSMENT AND PLAN      Physical therapy evaluationif possible and as tolerated .  OOB to chair/ambulation with assistance only.  Advanced care planning was discussed with family.  Pain is accessed and addressed.  Pt was screened for signs of clinical depression. Appropriate referral made.  Risk of falls accessed. Fall prevention and plan of care was discussed with family.  Pt is screened for tobacco and alcohol use. Counseling was done for smoking and alcohol cessation.  Use of narcotic pain meds was dicussed. Pt is advised to use narcotic meds wisely and to refrain from over using them.  Plan of care was discussed with family. Questions answered.  Would continue to follow.  30 minutes spent on total encounter; more than 50% of the visit was spent counseling and/or coordinating care by the attending physician.      Neurology follow up note  COVERING DR OXANA NGUYEN87yMale      Interval History:    Patient feels ok no new complaints.    MEDICATIONS    acetaminophen     Tablet .. 650 milliGRAM(s) Oral every 6 hours PRN  ALBUTerol    90 MICROgram(s) HFA Inhaler 2 Puff(s) Inhalation every 6 hours PRN  artificial tears (preservative free) Ophthalmic Solution 1 Drop(s) Both EYES two times a day PRN  atorvastatin 10 milliGRAM(s) Oral at bedtime  budesonide  80 MICROgram(s)/formoterol 4.5 MICROgram(s) Inhaler 2 Puff(s) Inhalation two times a day  cefTRIAXone   IVPB 1000 milliGRAM(s) IV Intermittent every 24 hours  enoxaparin Injectable 40 milliGRAM(s) SubCutaneous every 24 hours  montelukast 10 milliGRAM(s) Oral daily  sertraline 25 milliGRAM(s) Oral daily  tiotropium 18 MICROgram(s) Capsule 1 Capsule(s) Inhalation daily      Allergies    No Known Allergies    Intolerances            Vital Signs Last 24 Hrs  T(C): 36.9 (2022 04:53), Max: 37.4 (2022 20:21)  T(F): 98.4 (2022 04:53), Max: 99.3 (2022 20:21)  HR: 75 (2022 04:53) (75 - 86)  BP: 119/73 (2022 04:53) (118/72 - 129/79)  BP(mean): --  RR: 18 (2022 04:53) (18 - 19)  SpO2: 92% (2022 04:53) (90% - 92%)      REVIEW OF SYSTEMS:     Constitutional: No fever, chills, fatigue, weakness  Eyes: no eye pain, visual disturbances, or discharge  ENT:  No difficulty hearing, tinnitus, vertigo; No sinus or throat pain  Neck: No pain or stiffness  Respiratory: No cough, dyspnea, wheezing   Cardiovascular: No chest pain, palpitations,   Gastrointestinal: No abdominal or epigastric pain. No nausea, vomiting  No diarrhea or constipation.   Genitourinary: No dysuria, frequency, hematuria or incontinence  Neurological: No headaches, lightheadedness, vertigo, numbness or tremors  Psychiatric: No depression, anxiety, mood swings or difficulty sleeping  Musculoskeletal: No joint pain or swelling; No muscle, back or extremity pain  Skin: No itching, burning, rashes or lesions   Lymph Nodes: No enlarged glands  Endocrine: No heat or cold intolerance; No hair loss   Allergy and Immunologic: No hives or eczema    On Neurological Examination:    Mental Status - Patient is alert, awake, oriented X3      Follow simple commands      Speech -   Fluent      Cranial Nerves -   extraocular eye movements intact.   smile symmetric  intact bilateral NLF    Motor Exam -   positive movement of all 4 extremities left foot brace    Muscle tone - is normal all over.  No asymmetry is seen.      Sensory    Bilateral intact to light touch    GENERAL Exam: Nontoxic , No Acute Distress   	  HEENT:  normocephalic, atraumatic  		  LUNGS: Clear bilaterally  No Wheeze   	  HEART: Normal S1S2   No murmur RRR        	  GI/ ABDOMEN:  Soft  Non tender    EXTREMITIES:   No Edema  No Clubbing  No Cyanosis 	   SKIN: Normal  No Ecchymosis               LABS:  CBC Full  -  ( 2022 08:40 )  WBC Count : 7.85 K/uL  RBC Count : 4.63 M/uL  Hemoglobin : 13.3 g/dL  Hematocrit : 41.1 %  Platelet Count - Automated : 172 K/uL  Mean Cell Volume : 88.8 fl  Mean Cell Hemoglobin : 28.7 pg  Mean Cell Hemoglobin Concentration : 32.4 gm/dL  Auto Neutrophil # : x  Auto Lymphocyte # : x  Auto Monocyte # : x  Auto Eosinophil # : x  Auto Basophil # : x  Auto Neutrophil % : x  Auto Lymphocyte % : x  Auto Monocyte % : x  Auto Eosinophil % : x  Auto Basophil % : x    Urinalysis Basic - ( 2022 07:28 )    Color: Yellow / Appearance: very cloudy / S.020 / pH: x  Gluc: x / Ketone: Trace  / Bili: Negative / Urobili: Negative   Blood: x / Protein: 100 / Nitrite: Positive   Leuk Esterase: Moderate / RBC: 11-25 /HPF / WBC >50   Sq Epi: x / Non Sq Epi: Occasional / Bacteria: Many      -    140  |  103  |  21  ----------------------------<  128<H>  4.2   |  31  |  0.99    Ca    9.2      2022 08:40      Hemoglobin A1C:       Vitamin B12         RADIOLOGY    ASSESSMENT AND PLAN  FALL mechanical mri negative for cva   CHRONIC FRONTAL HYGROMA/HEMATOMA supportive treatment  left Foot fracture medical follow up   hyperlipidemia statin  Advanced care directives were discussed   30 minutes spent on total encounter; more than 50% of the visit was spent counseling and/or coordinating care by the attending physician.

## 2022-06-18 NOTE — DISCHARGE NOTE NURSING/CASE MANAGEMENT/SOCIAL WORK - PATIENT PORTAL LINK FT
You can access the FollowMyHealth Patient Portal offered by [facility] by registering at the following website: [email or web address]. By joining [facility]’s FollowMyHealth portal, you will also be able to view your health information using other applications (apps) compatible with our system.

## 2022-06-18 NOTE — DISCHARGE NOTE PROVIDER - PROVIDER TOKENS
PROVIDER:[TOKEN:[[id number]],FOLLOWUP:[1 week]],PROVIDER:[TOKEN:[[id number]],FOLLOWUP:[1 week],ESTABLISHEDPATIENT:[T]] PROVIDER:[TOKEN:[[id number]],FOLLOWUP:[1 week],ESTABLISHEDPATIENT:[T]],PROVIDER:[TOKEN:[[id number]],FOLLOWUP:[1 week]],PROVIDER:[TOKEN:[[id number]],FOLLOWUP:[1 week]]

## 2022-06-18 NOTE — DISCHARGE NOTE PROVIDER - NSDCACTIVITY_GEN_ALL_CORE
Bathing allowed/Showering allowed Bathing allowed/Do not drive or operate machinery/Showering allowed/No heavy lifting/straining

## 2022-06-18 NOTE — DISCHARGE NOTE PROVIDER - CARE PROVIDERS DIRECT ADDRESSES
[email or web address],DirectAddress_Unknown ,DirectAddress_Unknown,DirectAddress_Unknown,DirectAddress_Unknown

## 2022-06-18 NOTE — CHART NOTE - NSCHARTNOTEFT_GEN_A_CORE
Nutr. re-assessment:     Factors impacting intake: [ ] none [ ] nausea  [ ] vomiting [ ] diarrhea [ ] constipation  [ ]chewing problems [ ] swallowing issues  [ ] other:     Diet Prescription: Diet, Regular:   Supplement Feeding Modality:  Oral  Ensure Enlive Cans or Servings Per Day:  1       Frequency:  Two Times a day (06-16-22 @ 12:01)    Intake:     Current Weight: Weight (kg): 44.5 (06-14 @ 12:32)  % Weight Change    Pertinent Medications: MEDICATIONS  (STANDING):  atorvastatin 10 milliGRAM(s) Oral at bedtime  budesonide  80 MICROgram(s)/formoterol 4.5 MICROgram(s) Inhaler 2 Puff(s) Inhalation two times a day  cefTRIAXone   IVPB 1000 milliGRAM(s) IV Intermittent every 24 hours  enoxaparin Injectable 40 milliGRAM(s) SubCutaneous every 24 hours  montelukast 10 milliGRAM(s) Oral daily  sertraline 25 milliGRAM(s) Oral daily  tiotropium 18 MICROgram(s) Capsule 1 Capsule(s) Inhalation daily    MEDICATIONS  (PRN):  acetaminophen     Tablet .. 650 milliGRAM(s) Oral every 6 hours PRN Temp greater or equal to 38C (100.4F), Mild Pain (1 - 3)  ALBUTerol    90 MICROgram(s) HFA Inhaler 2 Puff(s) Inhalation every 6 hours PRN Shortness of Breath and/or Wheezing  artificial tears (preservative free) Ophthalmic Solution 1 Drop(s) Both EYES two times a day PRN Dry Eyes    Pertinent Labs: 06-18 Na141 mmol/L Glu 139 mg/dL<H> K+ 4.1 mmol/L Cr  0.90 mg/dL BUN 25 mg/dL<H> 06-14 Alb 3.6 g/dL     CAPILLARY BLOOD GLUCOSE        Skin:     Estimated Needs:   [ ] no change since previous assessment  [ ] recalculated:     Previous Nutrition Diagnosis:   [ ] Inadequate Energy Intake [ ]Inadequate Oral Intake [ ] Excessive Energy Intake   [ ] Underweight [ ] Increased Nutrient Needs [ ] Overweight/Obesity   [ ] Altered GI Function [ ] Unintended Weight Loss [ ] Food & Nutrition Related Knowledge Deficit [ ] Malnutrition     Nutrition Diagnosis is [ ] ongoing  [ ] resolved [ ] not applicable     New Nutrition Diagnosis: [ ] not applicable       Interventions:   Recommend  [ ] Change Diet To:  [ ] Nutrition Supplement  [ ] Nutrition Support  [ ] Other:     Monitoring and Evaluation:   [ ] PO intake [ x ] Tolerance to diet prescription [ x ] weights [ x ] labs[ x ] follow up per protocol  [ ] other: Nutr. re-assessment: Pt is an 87 y.o. M wit ha PMH of HTN, HLD, COPD, Depression, Hep C, Duodenal ulcer diease, Lung nodules who is found to have subdural hygromas on head CT. He is being admitted for further work-up and management of falls. CT foot with L talar, calcaneal and phalanx fracture. No surgical intervention per podiatry    Factors impacting intake: [ ] none [ ] nausea  [ ] vomiting [ ] diarrhea [ ] constipation  [ ]chewing problems [ ] swallowing issues  [ ] other:     Diet Prescription: Diet, Regular:   Supplement Feeding Modality:  Oral  Ensure Enlive Cans or Servings Per Day:  1       Frequency:  Two Times a day (06-16-22 @ 12:01)    Intake:     Current Weight: Weight (kg): 44.5 (06-14 @ 12:32)  % Weight Change    Pertinent Medications: MEDICATIONS  (STANDING):  atorvastatin 10 milliGRAM(s) Oral at bedtime  budesonide  80 MICROgram(s)/formoterol 4.5 MICROgram(s) Inhaler 2 Puff(s) Inhalation two times a day  cefTRIAXone   IVPB 1000 milliGRAM(s) IV Intermittent every 24 hours  enoxaparin Injectable 40 milliGRAM(s) SubCutaneous every 24 hours  montelukast 10 milliGRAM(s) Oral daily  sertraline 25 milliGRAM(s) Oral daily  tiotropium 18 MICROgram(s) Capsule 1 Capsule(s) Inhalation daily    MEDICATIONS  (PRN):  acetaminophen     Tablet .. 650 milliGRAM(s) Oral every 6 hours PRN Temp greater or equal to 38C (100.4F), Mild Pain (1 - 3)  ALBUTerol    90 MICROgram(s) HFA Inhaler 2 Puff(s) Inhalation every 6 hours PRN Shortness of Breath and/or Wheezing  artificial tears (preservative free) Ophthalmic Solution 1 Drop(s) Both EYES two times a day PRN Dry Eyes    Pertinent Labs: 06-18 Na141 mmol/L Glu 139 mg/dL<H> K+ 4.1 mmol/L Cr  0.90 mg/dL BUN 25 mg/dL<H> 06-14 Alb 3.6 g/dL     CAPILLARY BLOOD GLUCOSE        Skin:     Estimated Needs:   [ ] no change since previous assessment  [ ] recalculated:     Previous Nutrition Diagnosis:   [ ] Inadequate Energy Intake [ ]Inadequate Oral Intake [ ] Excessive Energy Intake   [ ] Underweight [ ] Increased Nutrient Needs [ ] Overweight/Obesity   [ ] Altered GI Function [ ] Unintended Weight Loss [ ] Food & Nutrition Related Knowledge Deficit [ ] Malnutrition     Nutrition Diagnosis is [ ] ongoing  [ ] resolved [ ] not applicable     New Nutrition Diagnosis: [ ] not applicable       Interventions:   Recommend  [ ] Change Diet To:  [ ] Nutrition Supplement  [ ] Nutrition Support  [ ] Other:     Monitoring and Evaluation:   [ ] PO intake [ x ] Tolerance to diet prescription [ x ] weights [ x ] labs[ x ] follow up per protocol  [ ] other: Nutr. re-assessment: Pt is an 87 y.o. M wit ha PMH of HTN, HLD, COPD, Depression, Hep C, Duodenal ulcer diease, Lung nodules who is found to have subdural hygromas on head CT. He is being admitted for further work-up and management of falls. CT foot with L talar, calcaneal and phalanx fracture. No surgical intervention per podiatry    Pt is an 87 y.o. M wit ha PMH of HTN, HLD, COPD, Depression, Hep C, Duodenal ulcer diease, Lung nodules who is found to have subdural hygromas on head CT. He is being admitted for further work-up and management of falls. CT foot with L talar, calcaneal and phalanx fracture. No surgical intervention per podiatry. UA positive, being treated with Rocephin.         Factors impacting intake: [ ] none [ ] nausea  [ ] vomiting [ ] diarrhea [ ] constipation  [ ]chewing problems [ ] swallowing issues  [ ] other:     Diet Prescription: Diet, Regular:   Supplement Feeding Modality:  Oral  Ensure Enlive Cans or Servings Per Day:  1       Frequency:  Two Times a day (06-16-22 @ 12:01)    Intake:     Current Weight: Weight (kg): 44.5 (06-14 @ 12:32)  % Weight Change    Pertinent Medications: MEDICATIONS  (STANDING):  atorvastatin 10 milliGRAM(s) Oral at bedtime  budesonide  80 MICROgram(s)/formoterol 4.5 MICROgram(s) Inhaler 2 Puff(s) Inhalation two times a day  cefTRIAXone   IVPB 1000 milliGRAM(s) IV Intermittent every 24 hours  enoxaparin Injectable 40 milliGRAM(s) SubCutaneous every 24 hours  montelukast 10 milliGRAM(s) Oral daily  sertraline 25 milliGRAM(s) Oral daily  tiotropium 18 MICROgram(s) Capsule 1 Capsule(s) Inhalation daily    MEDICATIONS  (PRN):  acetaminophen     Tablet .. 650 milliGRAM(s) Oral every 6 hours PRN Temp greater or equal to 38C (100.4F), Mild Pain (1 - 3)  ALBUTerol    90 MICROgram(s) HFA Inhaler 2 Puff(s) Inhalation every 6 hours PRN Shortness of Breath and/or Wheezing  artificial tears (preservative free) Ophthalmic Solution 1 Drop(s) Both EYES two times a day PRN Dry Eyes    Pertinent Labs: 06-18 Na141 mmol/L Glu 139 mg/dL<H> K+ 4.1 mmol/L Cr  0.90 mg/dL BUN 25 mg/dL<H> 06-14 Alb 3.6 g/dL     CAPILLARY BLOOD GLUCOSE        Skin:     Estimated Needs:   [ ] no change since previous assessment  [ ] recalculated:     Previous Nutrition Diagnosis:   [ ] Inadequate Energy Intake [ ]Inadequate Oral Intake [ ] Excessive Energy Intake   [ ] Underweight [ ] Increased Nutrient Needs [ ] Overweight/Obesity   [ ] Altered GI Function [ ] Unintended Weight Loss [ ] Food & Nutrition Related Knowledge Deficit [ ] Malnutrition     Nutrition Diagnosis is [ ] ongoing  [ ] resolved [ ] not applicable     New Nutrition Diagnosis: [ ] not applicable       Interventions:   Recommend  [ ] Change Diet To:  [ ] Nutrition Supplement  [ ] Nutrition Support  [ ] Other:     Monitoring and Evaluation:   [ ] PO intake [ x ] Tolerance to diet prescription [ x ] weights [ x ] labs[ x ] follow up per protocol  [ ] other: Nutr. re-assessment: Pt is an 87 y.o. M wit ha PMH of HTN, HLD, COPD, Depression, Hep C, Duodenal ulcer diease, Lung nodules who is found to have subdural hygromas on head CT. He is being admitted for further work-up and management of falls. CT foot with L talar, calcaneal and phalanx fracture. No surgical intervention per podiatry. UA positive, being treated with Rocephin.   PO flds are being encouraged by RD. Pt reports he has a BM 3 days ago, eating well but only drinking 1 ensure enlive every other day to once per day . Noted, d/c to [facility] anticipated        Factors impacting intake: [ ] none [ ] nausea  [ ] vomiting [ ] diarrhea [ ] constipation  [ ]chewing problems [ ] swallowing issues  [ ] other:     Diet Prescription: Diet, Regular:   Supplement Feeding Modality:  Oral  Ensure Enlive Cans or Servings Per Day:  1       Frequency:  Two Times a day (06-16-22 @ 12:01)    Intake:     Current Weight: Weight (kg): 44.5 (06-14 @ 12:32)  % Weight Change    Pertinent Medications: MEDICATIONS  (STANDING):  atorvastatin 10 milliGRAM(s) Oral at bedtime  budesonide  80 MICROgram(s)/formoterol 4.5 MICROgram(s) Inhaler 2 Puff(s) Inhalation two times a day  cefTRIAXone   IVPB 1000 milliGRAM(s) IV Intermittent every 24 hours  enoxaparin Injectable 40 milliGRAM(s) SubCutaneous every 24 hours  montelukast 10 milliGRAM(s) Oral daily  sertraline 25 milliGRAM(s) Oral daily  tiotropium 18 MICROgram(s) Capsule 1 Capsule(s) Inhalation daily    MEDICATIONS  (PRN):  acetaminophen     Tablet .. 650 milliGRAM(s) Oral every 6 hours PRN Temp greater or equal to 38C (100.4F), Mild Pain (1 - 3)  ALBUTerol    90 MICROgram(s) HFA Inhaler 2 Puff(s) Inhalation every 6 hours PRN Shortness of Breath and/or Wheezing  artificial tears (preservative free) Ophthalmic Solution 1 Drop(s) Both EYES two times a day PRN Dry Eyes    Pertinent Labs: 06-18 Na141 mmol/L Glu 139 mg/dL<H> K+ 4.1 mmol/L Cr  0.90 mg/dL BUN 25 mg/dL<H> 06-14 Alb 3.6 g/dL     CAPILLARY BLOOD GLUCOSE        Skin:     Estimated Needs:   [ ] no change since previous assessment  [ ] recalculated:     Previous Nutrition Diagnosis:   [ ] Inadequate Energy Intake [ ]Inadequate Oral Intake [ ] Excessive Energy Intake   [ ] Underweight [ ] Increased Nutrient Needs [ ] Overweight/Obesity   [ ] Altered GI Function [ ] Unintended Weight Loss [ ] Food & Nutrition Related Knowledge Deficit [ ] Malnutrition     Nutrition Diagnosis is [ ] ongoing  [ ] resolved [ ] not applicable     New Nutrition Diagnosis: [ ] not applicable       Interventions:   Recommend  [ ] Change Diet To:  [ ] Nutrition Supplement  [ ] Nutrition Support  [ ] Other:     Monitoring and Evaluation:   [ ] PO intake [ x ] Tolerance to diet prescription [ x ] weights [ x ] labs[ x ] follow up per protocol  [ ] other: Nutr. re-assessment: Pt is an 87 y.o. M wit ha PMH of HTN, HLD, COPD, Depression, Hep C, Duodenal ulcer diease, Lung nodules who is found to have subdural hygromas on head CT. He is being admitted for further work-up and management of falls. CT foot with L talar, calcaneal and phalanx fracture. No surgical intervention per podiatry. UA positive, being treated with Rocephin.   PO flds are being encouraged by RD. Pt reports he has a BM 3 days ago, eating well but only drinking 1 Ensure Enlive every other day to once per day . Noted, d/c to [facility] anticipated      Factors impacting intake: [ X] none [ ] nausea  [ ] vomiting [ ] diarrhea [ ] constipation  [ ]chewing problems [ ] swallowing issues  [ ] other:     Diet Prescription: Diet, Regular:   Supplement Feeding Modality:  Oral  Ensure Enlive Cans or Servings Per Day:  1       Frequency:  Two Times a day (06-16-22 @ 12:01)    Intake: >/=75%    Current Weight: Weight (kg): 44.5 (06-14 @ 12:32) 47.5 kg ( 6/18/22) doubtful pt has gained 3 kg in 4 days, most likely r/t scale accuracy   % Weight Change: n/a    Pertinent Medications: MEDICATIONS  (STANDING):  atorvastatin 10 milliGRAM(s) Oral at bedtime  budesonide  80 MICROgram(s)/formoterol 4.5 MICROgram(s) Inhaler 2 Puff(s) Inhalation two times a day  cefTRIAXone   IVPB 1000 milliGRAM(s) IV Intermittent every 24 hours  enoxaparin Injectable 40 milliGRAM(s) SubCutaneous every 24 hours  montelukast 10 milliGRAM(s) Oral daily  sertraline 25 milliGRAM(s) Oral daily  tiotropium 18 MICROgram(s) Capsule 1 Capsule(s) Inhalation daily    MEDICATIONS  (PRN):  acetaminophen     Tablet .. 650 milliGRAM(s) Oral every 6 hours PRN Temp greater or equal to 38C (100.4F), Mild Pain (1 - 3)  ALBUTerol    90 MICROgram(s) HFA Inhaler 2 Puff(s) Inhalation every 6 hours PRN Shortness of Breath and/or Wheezing  artificial tears (preservative free) Ophthalmic Solution 1 Drop(s) Both EYES two times a day PRN Dry Eyes    Pertinent Labs: 06-18 Na141 mmol/L Glu 139 mg/dL<H> K+ 4.1 mmol/L Cr  0.90 mg/dL BUN 25 mg/dL<H> 06-14 Alb 3.6 g/dL     CAPILLARY BLOOD GLUCOSE    Skin: no PI's  edema: no   BM: 6/15    Estimated Needs:   [ X] no change since previous assessment  [ ] recalculated:     Previous Nutrition Diagnosis:   [ ] Inadequate Energy Intake [ ]Inadequate Oral Intake [ ] Excessive Energy Intake   [ ] Underweight [ ] Increased Nutrient Needs [ ] Overweight/Obesity   [ ] Altered GI Function [ ] Unintended Weight Loss [ ] Food & Nutrition Related Knowledge Deficit [ X] Malnutrition     Nutrition Diagnosis is [X ] ongoing  [ ] resolved [ ] not applicable     New Nutrition Diagnosis: [ ] not applicable       Interventions:   Recommend  [ ] Change Diet To:  [ ] Nutrition Supplement  [ ] Nutrition Support  [X ] Other: cont POC    Monitoring and Evaluation:   [X ] PO intake [ x ] Tolerance to diet prescription [ x ] weights [ x ] labs[ x ] follow up per protocol  [ ] other:

## 2022-06-18 NOTE — DISCHARGE NOTE PROVIDER - NSDCFUADDAPPT_GEN_ALL_CORE_FT
Patient or caretaker is responsible for all follow up appointments.  Please return to the ED immediately if your condition acutely worsens.

## 2022-06-18 NOTE — DISCHARGE NOTE NURSING/CASE MANAGEMENT/SOCIAL WORK - NSDCVIVACCINE_GEN_ALL_CORE_FT
COVID-19, mRNA, LNP-S, PF, 100 mcg/ 0.5 mL dose (Moderna); 17-Jun-2022 15:07; Georgia Valdez (RN); [employer] [id number] (Exp. Date: 31-Aug-2022); IntraMuscular; Deltoid Right.; 0.25 milliLiter(s);

## 2022-06-18 NOTE — DISCHARGE NOTE PROVIDER - NSDCMRMEDTOKEN_GEN_ALL_CORE_FT
acetaminophen 325 mg oral tablet: 2 tab(s) orally every 6 hours, As needed, Temp greater or equal to 38C (100.4F), Mild Pain (1 - 3)  albuterol 90 mcg/inh inhalation aerosol: 2 puff(s) inhaled every 6 hours, As needed, Shortness of Breath and/or Wheezing  atorvastatin 10 mg oral tablet: 1 tab(s) orally once a day (at bedtime)  budesonide-formoterol 80 mcg-4.5 mcg/inh inhalation aerosol: 2  inhaled 2 times a day  cefpodoxime 200 mg oral tablet: 1 tab(s) orally every 12 hours until 6/20  enoxaparin: 40 milligram(s) subcutaneous every 24 hours  montelukast 10 mg oral tablet: 1 tab(s) orally once a day  ocular lubricant ophthalmic solution: 1 drop(s) to each affected eye 2 times a day, As needed, Dry Eyes  sertraline 25 mg oral tablet: 1 tab(s) orally once a day  tiotropium 18 mcg inhalation capsule: 1 cap(s) inhaled once a day

## 2022-06-18 NOTE — PROGRESS NOTE ADULT - SUBJECTIVE AND OBJECTIVE BOX
[facility]  INFECTIOUS DISEASES   [address] [place] [address]  Tel: [phone]     Fax: [phone]  ======================================================  MD Quique Marroquin Kaushal, MD Cho, Michelle, MD   ======================================================    [id number]  CATHERINE NGUYEN     Follow up; UTI and left foot fracture     Doing better, less pain in left foot, has a boot. No more fever.  No urinary symptoms.     PAST MEDICAL & SURGICAL HISTORY:  Lung nodule, multiple  COPD (chronic obstructive pulmonary disease)  Depression  Emphysema  Duodenal ulcer disease  Hepatitis C  patients daughter states that she was told that the patient has hepitatis c  HLD (hyperlipidemia)  No significant past surgical history    Social Hx: no smoking, ETOH Or drugs     FAMILY HISTORY:  Family history of cerebral infarction (Father)  father  age 71    Allergies  No Known Allergies    Antibiotics:  MEDICATIONS  (STANDING):  atorvastatin 10 milliGRAM(s) Oral at bedtime  budesonide  80 MICROgram(s)/formoterol 4.5 MICROgram(s) Inhaler 2 Puff(s) Inhalation two times a day  cefTRIAXone   IVPB 1000 milliGRAM(s) IV Intermittent every 24 hours  enoxaparin Injectable 40 milliGRAM(s) SubCutaneous every 24 hours  montelukast 10 milliGRAM(s) Oral daily  sertraline 25 milliGRAM(s) Oral daily  tiotropium 18 MICROgram(s) Capsule 1 Capsule(s) Inhalation daily     REVIEW OF SYSTEMS:  CONSTITUTIONAL:  No Fever or chills  HEENT:  No diplopia or blurred vision.  No sore throat or runny nose.  CARDIOVASCULAR:  No chest pain or SOB.  RESPIRATORY:  No cough, shortness of breath, PND or orthopnea.  GASTROINTESTINAL:  No nausea, vomiting or diarrhea.  GENITOURINARY:  No dysuria, frequency or urgency. No Blood in urine  MUSCULOSKELETAL: Right foot pain   SKIN:  No change in skin, hair or nails.  NEUROLOGIC:  No paresthesias, fasciculations, seizures or weakness.  PSYCHIATRIC:  No disorder of thought or mood.  ENDOCRINE:  No heat or cold intolerance, polyuria or polydipsia.  HEMATOLOGICAL:  No easy bruising or bleeding.     Physical Exam:  Vital Signs Last 24 Hrs  T(C): 36.9 (2022 04:53), Max: 37.4 (2022 20:21)  T(F): 98.4 (2022 04:53), Max: 99.3 (2022 20:21)  HR: 75 (2022 04:53) (75 - 86)  BP: 119/73 (2022 04:53) (118/72 - 129/79)  BP(mean): --  RR: 18 (2022 04:53) (18 - 19)  SpO2: 92% (2022 04:53) (90% - 92%)  GEN: NAD  HEENT: normocephalic and atraumatic. EOMI. PERRL.    NECK: Supple.  No lymphadenopathy   LUNGS: Clear to auscultation.  HEART: Regular rate and rhythm without murmur.  ABDOMEN: Soft, nontender, and nondistended.  Positive bowel sounds.    : No CVA tenderness  EXTREMITIES: Without any cyanosis, clubbing, rash, lesions or edema.  NEUROLOGIC: grossly intact.  PSYCHIATRIC: Appropriate affect .  SKIN: No ulceration or induration present.      Labs:                        13.0   8.15  )-----------( 189      ( 2022 08:32 )             40.1         141  |  105  |  25<H>  ----------------------------<  139<H>  4.1   |  30  |  0.90    Ca    9.3      2022 08:32    Culture - Urine (collected 22 @ 12:52)  Source: Clean Catch Clean Catch (Midstream)  Final Report (22 @ 12:05):    >=3 organisms. Probable collection contamination.    Culture - Blood (collected 22 @ 03:38)  Source: .Blood Blood-Peripheral    Culture - Blood (collected 22 @ 03:38)  Source: .Blood Blood-Peripheral    WBC Count: 8.15 K/uL (22 @ 08:32)  WBC Count: 7.85 K/uL (22 @ 08:40)  WBC Count: 8.67 K/uL (22 @ 07:50)  WBC Count: 10.85 K/uL (06-15-22 @ 09:20)  WBC Count: 10.70 K/uL (22 @ 15:22)    Creatinine, Serum: 0.90 mg/dL (22 @ 08:32)  Creatinine, Serum: 0.99 mg/dL (22 @ 08:40)  Creatinine, Serum: 0.93 mg/dL (22 @ 07:50)  Creatinine, Serum: 1.00 mg/dL (06-15-22 @ 09:20)  Creatinine, Serum: 1.00 mg/dL (22 @ 15:22)    Procalcitonin, Serum: 0.14 ng/mL (06-15-22 @ 22:45)     COVID-19 PCR: NotDetec (22 @ 16:02)  COVID-19 PCR: NotDetec (22 @ 15:23)    All imaging and other data have been reviewed.  < from: Xray Chest 1 View- PORTABLE-Urgent (Xray Chest 1 View- PORTABLE-Urgent .) (06.15.22 @ 23:44) >  IMPRESSION:  No acute radiographic findings and no change    < from: CT Foot No Cont, Left (06.15.22 @ 09:05) >  IMPRESSION:  1. Mildly displaced cortical fracture involves the dorsum of the talar   head.  2. Nondisplaced fracture of the anterior process of the calcaneus with   nonosseous calcaneonavicular coalition.  3. Nondisplaced fracture of the 2nd proximal phalanx base.    Assessment and Plan:   88 yo man with PMH of HTN, COPD, HCV, depression and peptic ulcer was admitted after a fall and was found to have multiple fractures in left foot (2nd toe and talus and calcaneus).   His labs showed that he has abnormal UA + frequency, had a UTI few months ago S enterobacter.  ID has been called for fever that could be combination of UTI and fracture but usually reactive fever is not as high and simple cystitis shouldn't cause a high fever.     Recommendations:    - Blood culture x 2 NGTD  - UC with multiple bacteria but could be real   - Podiatry consult noted  - Either ceftriaxone or vantin 200mg q12 to complete total 5days   - For possible rehab transfer, COVID booster in required, can be given before discharge    Will see PRN, can be discharged from ID stand point.   Discussed with Dr. Diaz.     Reji Bull MD  Division of Infectious Diseases   Please call ID service at [phone] with any question.      35 minutes spent on total encounter assessing patient, examination, chart reivew, counseling and coordinating care by the attending physician/nurse/care manager.

## 2022-06-18 NOTE — DISCHARGE NOTE PROVIDER - NSDCCPCAREPLAN_GEN_ALL_CORE_FT
PRINCIPAL DISCHARGE DIAGNOSIS  Diagnosis: Fall  Assessment and Plan of Treatment: Pt presented to the hospital after a mechanical fall. CT of the lower extremity showed talar, calcaneal an 2nd phalanx fractures. Podiatry was consulted and pt was recommended to use CAM boot. Pt should be non-weight bearing on left lower extremity. Pt may take Tylenol PRN for pain.   WOUND CARE INSTRUCTIONS  1. Please keep CAM boot at all time. Pt may remove cam boot for showering and sleeping. Monitor for any signs of infection and present to the ED if they arise.  2. Patient is to be NON weight bearing to the LLE with cam boot and assited device.  3. Patient is to [facility] up [facility] with Dr. Singh or Dr. Mccain within 1-2 weeks upon discharge. Please call [phone] as soon as discharged.      SECONDARY DISCHARGE DIAGNOSES  Diagnosis: Hygroma  Assessment and Plan of Treatment: CT head showed subdural hygromas along the left frontal temporal convexity as well as the left lateral aspect of the posterior cranial fossa with regional mass effect. Neurosurgery decided on no surgical intervention. Brain MRI was negative for infarcts or hemorrhage. Follow up with a neurologist within 2 weeks for further management.    Diagnosis: Acute UTI  Assessment and Plan of Treatment: Pt with new fever, UA positive. Pt was treated with IV Ceftriaxone. Continue to take Vantin 200mg every 12 hours until 6/20. Do not stop taking the antibiotic even if you start to feel better.  SEEK IMMEDIATE MEDICAL CARE IF YOU HAVE ANY OF THE FOLLOWING SYMPTOMS: severe back or abdominal pain, fever, inability to keep fluids or medicine down, dizziness/lightheadedness, or a change in mental status.      Diagnosis: HLD (hyperlipidemia)  Assessment and Plan of Treatment: Continue atorvastatin 10mg daily    Diagnosis: Major depression  Assessment and Plan of Treatment: Continue Sertraline 25mg daily.    Diagnosis: History of COPD  Assessment and Plan of Treatment: Continue symbicort and spiriva with albuterol PRN for shortness of breath or wheezing     PRINCIPAL DISCHARGE DIAGNOSIS  Diagnosis: Fall  Assessment and Plan of Treatment: Pt presented to the hospital after a mechanical fall. CT of the lower extremity showed talar, calcaneal an 2nd phalanx fractures. Podiatry was consulted and pt was recommended to use CAM boot. Pt should be non-weight bearing on left lower extremity. Pt may take Tylenol PRN for pain.   WOUND CARE INSTRUCTIONS  1. Please keep CAM boot at all time. Pt may remove cam boot for showering and sleeping. Monitor for any signs of infection and present to the ED if they arise.  2. Patient is to be NON weight bearing to the LLE with cam boot and assited device.  3. Patient is to follow up [facility] with Dr. Singh or Dr. Mccain within 1-2 weeks upon discharge. Please call [phone] as soon as discharged.      SECONDARY DISCHARGE DIAGNOSES  Diagnosis: Hygroma  Assessment and Plan of Treatment: CT head showed subdural hygromas along the left frontal temporal convexity as well as the left lateral aspect of the posterior cranial fossa with regional mass effect. Neurosurgery decided on no surgical intervention. Brain MRI was negative for infarcts or hemorrhage. Follow up with a neurologist Dr Cuevas within 2 weeks for further management.    Diagnosis: Acute UTI  Assessment and Plan of Treatment: Pt with new fever, UA positive. Pt was treated with IV Ceftriaxone. Continue to take Vantin 200mg every 12 hours until 6/20. Do not stop taking the antibiotic even if you start to feel better.  SEEK IMMEDIATE MEDICAL CARE IF YOU HAVE ANY OF THE FOLLOWING SYMPTOMS: severe back or abdominal pain, fever, inability to keep fluids or medicine down, dizziness/lightheadedness, or a change in mental status. Follow up with your primary medical provier for further care and management      Diagnosis: HLD (hyperlipidemia)  Assessment and Plan of Treatment: Continue atorvastatin 10mg daily    Diagnosis: History of COPD  Assessment and Plan of Treatment: Continue symbicort and spiriva with albuterol PRN for shortness of breath or wheezing    Diagnosis: Major depression  Assessment and Plan of Treatment: Continue Sertraline 25mg daily.

## 2022-06-18 NOTE — DISCHARGE NOTE PROVIDER - ATTENDING DISCHARGE PHYSICAL EXAMINATION:
GENERAL: patient appears well, no acute distress, appropriate, pleasant, elder  EYES: sclera clear, no exudates  ENMT: oropharynx clear without erythema, no exudates, moist mucous membranes  NECK: supple, soft  LUNGS: good air entry bilaterally, clear to auscultation, symmetric breath sounds, no wheezing or rhonchi appreciated  HEART: soft S1/S2, regular rate and rhythm, no murmurs noted, no lower extremity edema  GASTROINTESTINAL: abdomen is soft, nontender, nondistended, normoactive bowel sounds, no palpable masses  INTEGUMENT: warm & dry   MUSCULOSKELETAL: LLE CAM boot in place, no clubbing or cyanosis, no obvious deformity  NEUROLOGIC: awake, alert, oriented x3, moving all extremities, no obvious sensory deficits  ---

## 2022-06-18 NOTE — DISCHARGE NOTE PROVIDER - CARE PROVIDER_API CALL
Payam Singh (DPM)  Podiatric Medicine and Surgery  [address] [place] [address]  Phone: [phone]  Fax: [phone]  Follow Up Time: 1 week    Guillaume Salas  [facility] MEDICINE  Internal Medicine, [address] [facility] [address] [facility], [address] [place] [address]  Phone: [phone]  Fax: [phone]  Established Patient  Follow Up Time: 1 week   Guillaume Salas  FAMILY MEDICINE  Internal Medicine, [address] [facility], [address]  [place] [address]  Phone: [phone]  Fax: [phone]  [facility] Patient  Follow Up Time: 1 week    Wilbert Mccain (DPM)  Foot Surgery; Podiatric Medicine  [address] [place] [address]  Phone: [phone]  Fax: [phone]  Follow Up Time: 1 week    Artemio Cuevas  NEUROLOGY  [address] [place] [address]  Phone: [phone]  Fax: [phone]  Follow Up Time: 1 week

## 2022-06-18 NOTE — DISCHARGE NOTE PROVIDER - DETAILS OF MALNUTRITION DIAGNOSIS/DIAGNOSES
This patient has been assessed with a concern for Malnutrition and was treated during this hospitalization for the following Nutrition diagnosis/diagnoses:     -  06/16/2022: Mild protein-calorie malnutrition   -  06/16/2022: Underweight (BMI < 19)

## 2022-06-18 NOTE — PROGRESS NOTE ADULT - ASSESSMENT
Pt is an 87 y.o. M wit ha PMH of HTN, HLD, COPD, Depression, Hep C, Duodenal ulcer diease, Lung nodules who is found to have subdural hygromas on head CT. He is being admitted for further work-up and management of falls. CT foot with L talar, calcaneal and phalanx fracture. No surgical intervention per podiatry. UA positive, being treated with Rocephin.  Pt is an 87 y.o. M wit ha PMH of HTN, HLD, COPD, Depression, Hep C, Duodenal ulcer diease, Lung nodules who is found to have subdural hygromas on head CT. He is being admitted for further work-up and management of falls. CT foot with L talar, calcaneal and phalanx fracture. No surgical intervention per podiatry. UA positive, being treated with Rocephin, awaiting for transition to [facility], medically stable for dc

## 2022-06-18 NOTE — PROGRESS NOTE ADULT - SUBJECTIVE AND OBJECTIVE BOX
Patient is a 87y old  Male who presents with a chief complaint of Fall (18 Jun 2022 11:13)      INTERVAL HPI/OVERNIGHT EVENTS: Patient seen and examined at bedside. No overnight events occurred. Patient is complaining of constipation, is stating his LLE pain is improving. Denies fevers, chills, headache, lightheadedness, chest pain, dyspnea, abdominal pain, n/v/d.    MEDICATIONS  (STANDING):  atorvastatin 10 milliGRAM(s) Oral at bedtime  budesonide  80 MICROgram(s)/formoterol 4.5 MICROgram(s) Inhaler 2 Puff(s) Inhalation two times a day  cefpodoxime 200 milliGRAM(s) Oral every 12 hours  enoxaparin Injectable 40 milliGRAM(s) SubCutaneous every 24 hours  montelukast 10 milliGRAM(s) Oral daily  sertraline 25 milliGRAM(s) Oral daily  tiotropium 18 MICROgram(s) Capsule 1 Capsule(s) Inhalation daily    MEDICATIONS  (PRN):  acetaminophen     Tablet .. 650 milliGRAM(s) Oral every 6 hours PRN Temp greater or equal to 38C (100.4F), Mild Pain (1 - 3)  ALBUTerol    90 MICROgram(s) HFA Inhaler 2 Puff(s) Inhalation every 6 hours PRN Shortness of Breath and/or Wheezing  artificial tears (preservative free) Ophthalmic Solution 1 Drop(s) Both EYES two times a day PRN Dry Eyes      Allergies    No Known Allergies    Intolerances        REVIEW OF SYSTEMS:  CONSTITUTIONAL: No fever or chills  HEENT:  No headache, no sore throat  RESPIRATORY: No cough, wheezing, or shortness of breath  CARDIOVASCULAR: No chest pain, palpitations  GASTROINTESTINAL: Admits to constipation, No abd pain, nausea, vomiting, or diarrhea  GENITOURINARY: No dysuria, frequency, or hematuria  NEUROLOGICAL: no focal weakness or dizziness  MUSCULOSKELETAL: improving LLE pain, no myalgias     Vital Signs Last 24 Hrs  T(C): 36.9 (18 Jun 2022 04:53), Max: 37.4 (17 Jun 2022 20:21)  T(F): 98.4 (18 Jun 2022 04:53), Max: 99.3 (17 Jun 2022 20:21)  HR: 75 (18 Jun 2022 04:53) (75 - 85)  BP: 119/73 (18 Jun 2022 04:53) (119/73 - 129/67)  BP(mean): --  RR: 18 (18 Jun 2022 04:53) (18 - 19)  SpO2: 92% (18 Jun 2022 04:53) (90% - 92%)    PHYSICAL EXAM:  GENERAL: NAD, eating breakfast   HEENT:  anicteric, moist mucous membranes  CHEST/LUNG:  CTA b/l, no rales, wheezes, or rhonchi  HEART:  RRR, S1, S2  ABDOMEN:  BS+, soft, nontender, nondistended  EXTREMITIES: LLE in CAM boot, no edema, cyanosis, or calf tenderness  NERVOUS SYSTEM: answers questions and follows commands appropriately    LABS:                        13.0   8.15  )-----------( 189      ( 18 Jun 2022 08:32 )             40.1     CBC Full  -  ( 18 Jun 2022 08:32 )  WBC Count : 8.15 K/uL  Hemoglobin : 13.0 g/dL  Hematocrit : 40.1 %  Platelet Count - Automated : 189 K/uL  Mean Cell Volume : 89.1 fl  Mean Cell Hemoglobin : 28.9 pg  Mean Cell Hemoglobin Concentration : 32.4 gm/dL  Auto Neutrophil # : 5.14 K/uL  Auto Lymphocyte # : 1.01 K/uL  Auto Monocyte # : 1.85 K/uL  Auto Eosinophil # : 0.07 K/uL  Auto Basophil # : 0.03 K/uL  Auto Neutrophil % : 63.0 %  Auto Lymphocyte % : 12.4 %  Auto Monocyte % : 22.7 %  Auto Eosinophil % : 0.9 %  Auto Basophil % : 0.4 %    18 Jun 2022 08:32    141    |  105    |  25     ----------------------------<  139    4.1     |  30     |  0.90     Ca    9.3        18 Jun 2022 08:32          CAPILLARY BLOOD GLUCOSE            Culture - Urine (collected 06-16-22 @ 12:52)  Source: Clean Catch Clean Catch (Midstream)  Final Report (06-17-22 @ 12:05):    >=3 organisms. Probable collection contamination.    Culture - Blood (collected 06-16-22 @ 03:38)  Source: .Blood Blood-Peripheral  Preliminary Report (06-17-22 @ 04:01):    No growth to date.    Culture - Blood (collected 06-16-22 @ 03:38)  Source: .Blood Blood-Peripheral  Preliminary Report (06-17-22 @ 04:01):    No growth to date.        RADIOLOGY & ADDITIONAL TESTS:    Personally reviewed.     Consultant(s) Notes Reviewed:  [x] YES  [ ] NO

## 2022-06-18 NOTE — DISCHARGE NOTE PROVIDER - HOSPITAL COURSE
FROM ADMISSION H+P:   HPI:  Pt is an 87 y.o. M wit ha PMH of HTN, HLD, COPD, Depression, Hep C, Duodenal ulcer diease, Lung nodules who presented to the ED with a CC of falls. Patient states that 2 days ago he was walking down the stairs at his house when he slipped on his socks and fell. He states he hurt his left foot and hit the back of his head. Denies any LOC. Denies fever, chills, headache, dizziness, chest pain, shortness of breath, cough, nausea, vomiting, abdominal pain, diarrhea or constipation. Of note patients daughter states he has been having balance issues over the past few months and has fallen a few times.     In the ED:  T 98.5 HR 88 /66 RR 18 SpO2 99% on 4L NC  WBC 10.70 Glucose 129  Head CT Non-Con: No acute intracranial injury. No acute C-spine fracture. SUBDURAL HYGROMAS ALONG THE LEFT FRONTAL TEMPORAL CONVEXITY AS WELL AS   THE LEFT LATERAL ASPECT OF THE POSTERIOR CRANIAL FOSSA MEASURING UP TO 1 CM IN THICKNESS WITH REGIONAL MASS EFFECT.  Xray Foot, T-Spine, Chest, Ankle: Slight residual right lung densities. No acute bony findings.  Tylenol 650mg PO x 1 (14 Jun 2022 17:51)      ---  HOSPITAL COURSE:   Patient was admitted to the hospital for further workup of pt's falls. CT head non-contrast on admission showed subdural hygromas along the left frontal temporal convexity as well as the left lateral aspect of the posterior cranial fossa. Left ankle xray was negative for acute fractures, but CT of the LLE showed talar, calcaneal and 2nd phalanx fractures. Podiatry was consulted and recommended no surgical intervention at this time and non-weight bearing on LLE. Neurosurgery was consulted for hygromas, but decided no surgical intervention was necessary at this time. Brain MRI was negative for infarcts or hemorrhage. Neuro was consulted. Pt developed a new fever during the hospital course and was complaining of dysuria. CXR was negative for acute pathology, UA was positive for nitrites and moderate leukocyte esterase. ID was consulted, pt was started on Rocephin but later transitioned to PO Vantin for discharge. Fever was thought to be partially reactive 2/2 fractures and the UTI. Ucx showed greater than 3 bacteria, probably a contaminant. Bcx were negative. PT recommended [facility]. Pt was seen and examined by attending physician on day of discharge and was deemed stable for discharge to [facility].     PHYSICAL EXAM ON DAY OF DISCHARGE:  T(C): 36.9 (06-18-22 @ 04:53), Max: 37.4 (06-17-22 @ 20:21)  HR: 75 (06-18-22 @ 04:53) (75 - 86)  BP: 119/73 (06-18-22 @ 04:53) (119/73 - 129/79)  RR: 18 (06-18-22 @ 04:53) (18 - 19)  SpO2: 92% (06-18-22 @ 04:53) (90% - 92%)    GENERAL: patient appears well, no acute distress, appropriate, pleasant  EYES: sclera clear, no exudates  ENMT: oropharynx clear without erythema, no exudates, moist mucous membranes  NECK: supple, soft  LUNGS: good air entry bilaterally, clear to auscultation, symmetric breath sounds, no wheezing or rhonchi appreciated  HEART: soft S1/S2, regular rate and rhythm, no murmurs noted, no lower extremity edema  GASTROINTESTINAL: abdomen is soft, nontender, nondistended, normoactive bowel sounds, no palpable masses  INTEGUMENT: warm & dry   MUSCULOSKELETAL: LLE CAM boot in place, no clubbing or cyanosis, no obvious deformity  NEUROLOGIC: awake, alert, oriented x3, moving all extremities, no obvious sensory deficits  ---  CONSULTANTS:   Neurology: Dr Raza  Podiatry: Dr Singh  ---  TIME SPENT:  I, the attending physician, was physically present for the key portions of the evaluation and management (E/M) service provided. The total amount of time spent reviewing the hospital notes, laboratory values, imaging findings, assessing/counseling the patient, discussing with consultant physicians, social work, nursing staff was -- minutes    ---  Primary care provider was made aware of plan for discharge:      [  ] NO     [  ] YES   FROM ADMISSION H+P:   HPI:  Pt is an 87 y.o. M wit ha PMH of HTN, HLD, COPD, Depression, Hep C, Duodenal ulcer diease, Lung nodules who presented to the ED with a CC of falls. Patient states that 2 days ago he was walking down the stairs at his house when he slipped on his socks and fell. He states he hurt his left foot and hit the back of his head. Denies any LOC. Denies fever, chills, headache, dizziness, chest pain, shortness of breath, cough, nausea, vomiting, abdominal pain, diarrhea or constipation. Of note patients daughter states he has been having balance issues over the past few months and has fallen a few times.     In the ED:  T 98.5 HR 88 /66 RR 18 SpO2 99% on 4L NC  WBC 10.70 Glucose 129  Head CT Non-Con: No acute intracranial injury. No acute C-spine fracture. SUBDURAL HYGROMAS ALONG THE LEFT FRONTAL TEMPORAL CONVEXITY AS WELL AS   THE LEFT LATERAL ASPECT OF THE POSTERIOR CRANIAL FOSSA MEASURING UP TO 1 CM IN THICKNESS WITH REGIONAL MASS EFFECT.  Xray Foot, T-Spine, Chest, Ankle: Slight residual right lung densities. No acute bony findings.  Tylenol 650mg PO x 1 (14 Jun 2022 17:51)      ---  HOSPITAL COURSE:   Patient was admitted to the hospital for further workup of pt's falls. CT head non-contrast on admission showed subdural hygromas along the left frontal temporal convexity as well as the left lateral aspect of the posterior cranial fossa. Left ankle xray was negative for acute fractures, but CT of the LLE showed talar, calcaneal and 2nd phalanx fractures. Podiatry was consulted and recommended no surgical intervention at this time and non-weight bearing on LLE. Neurosurgery was consulted for hygromas, but decided no surgical intervention was necessary at this time. Brain MRI was negative for infarcts or hemorrhage. Neuro was consulted and optimized patient's care. Pt developed a new fever during the hospital course and was complaining of dysuria. CXR was negative for acute pathology, UA was positive for nitrites and moderate leukocyte esterase. ID was consulted, pt was started on Rocephin but later transitioned to PO Vantin for discharge. Pt received booster dose of covid vaccine of moderna after consented.  Fever was thought to be partially reactive 2/2 fractures and the complicated UTI. Ucx showed greater than 3 bacteria, probably a contaminant. Bcx were negative. PT recommended [facility]. Pt was seen and examined by attending physician on day of discharge and was deemed stable for discharge to [facility].     PHYSICAL EXAM ON DAY OF DISCHARGE:  T(C): 36.9 (06-18-22 @ 04:53), Max: 37.4 (06-17-22 @ 20:21)  HR: 75 (06-18-22 @ 04:53) (75 - 86)  BP: 119/73 (06-18-22 @ 04:53) (119/73 - 129/79)  RR: 18 (06-18-22 @ 04:53) (18 - 19)  SpO2: 92% (06-18-22 @ 04:53) (90% - 92%)    GENERAL: patient appears well, no acute distress, appropriate, pleasant, elder  EYES: sclera clear, no exudates  ENMT: oropharynx clear without erythema, no exudates, moist mucous membranes  NECK: supple, soft  LUNGS: good air entry bilaterally, clear to auscultation, symmetric breath sounds, no wheezing or rhonchi appreciated  HEART: soft S1/S2, regular rate and rhythm, no murmurs noted, no lower extremity edema  GASTROINTESTINAL: abdomen is soft, nontender, nondistended, normoactive bowel sounds, no palpable masses  INTEGUMENT: warm & dry   MUSCULOSKELETAL: LLE CAM boot in place, no clubbing or cyanosis, no obvious deformity  NEUROLOGIC: awake, alert, oriented x3, moving all extremities, no obvious sensory deficits  ---  CONSULTANTS:   Neurology: Dr Raza  Podiatry: Dr Singh  ---  TIME SPENT:  I, the attending physician, was physically present for the key portions of the evaluation and management (E/M) service provided. The total amount of time spent reviewing the hospital notes, laboratory values, imaging findings, assessing/counseling the patient, discussing with consultant physicians, social work, nursing staff was 67 minutes

## 2022-06-19 LAB
ANION GAP SERPL CALC-SCNC: 5 MMOL/L — SIGNIFICANT CHANGE UP (ref 5–17)
BUN SERPL-MCNC: 25 MG/DL — HIGH (ref 7–23)
CALCIUM SERPL-MCNC: 9.4 MG/DL — SIGNIFICANT CHANGE UP (ref 8.5–10.1)
CHLORIDE SERPL-SCNC: 104 MMOL/L — SIGNIFICANT CHANGE UP (ref 96–108)
CO2 SERPL-SCNC: 31 MMOL/L — SIGNIFICANT CHANGE UP (ref 22–31)
CREAT SERPL-MCNC: 0.85 MG/DL — SIGNIFICANT CHANGE UP (ref 0.5–1.3)
EGFR: 84 ML/MIN/1.73M2 — SIGNIFICANT CHANGE UP
GLUCOSE SERPL-MCNC: 138 MG/DL — HIGH (ref 70–99)
HCT VFR BLD CALC: 39.4 % — SIGNIFICANT CHANGE UP (ref 39–50)
HGB BLD-MCNC: 12.9 G/DL — LOW (ref 13–17)
MCHC RBC-ENTMCNC: 28.9 PG — SIGNIFICANT CHANGE UP (ref 27–34)
MCHC RBC-ENTMCNC: 32.7 GM/DL — SIGNIFICANT CHANGE UP (ref 32–36)
MCV RBC AUTO: 88.3 FL — SIGNIFICANT CHANGE UP (ref 80–100)
NRBC # BLD: 0 /100 WBCS — SIGNIFICANT CHANGE UP (ref 0–0)
PLATELET # BLD AUTO: 204 K/UL — SIGNIFICANT CHANGE UP (ref 150–400)
POTASSIUM SERPL-MCNC: 4.3 MMOL/L — SIGNIFICANT CHANGE UP (ref 3.5–5.3)
POTASSIUM SERPL-SCNC: 4.3 MMOL/L — SIGNIFICANT CHANGE UP (ref 3.5–5.3)
RBC # BLD: 4.46 M/UL — SIGNIFICANT CHANGE UP (ref 4.2–5.8)
RBC # FLD: 14.1 % — SIGNIFICANT CHANGE UP (ref 10.3–14.5)
SODIUM SERPL-SCNC: 140 MMOL/L — SIGNIFICANT CHANGE UP (ref 135–145)
WBC # BLD: 7.72 K/UL — SIGNIFICANT CHANGE UP (ref 3.8–10.5)
WBC # FLD AUTO: 7.72 K/UL — SIGNIFICANT CHANGE UP (ref 3.8–10.5)

## 2022-06-19 PROCEDURE — 99232 SBSQ HOSP IP/OBS MODERATE 35: CPT

## 2022-06-19 PROCEDURE — 99232 SBSQ HOSP IP/OBS MODERATE 35: CPT | Mod: GC

## 2022-06-19 RX ADMIN — MONTELUKAST 10 MILLIGRAM(S): 4 TABLET, CHEWABLE ORAL at 11:31

## 2022-06-19 RX ADMIN — SERTRALINE 25 MILLIGRAM(S): 25 TABLET, FILM COATED ORAL at 11:31

## 2022-06-19 RX ADMIN — BUDESONIDE AND FORMOTEROL FUMARATE DIHYDRATE 2 PUFF(S): 160; 4.5 AEROSOL RESPIRATORY (INHALATION) at 17:30

## 2022-06-19 RX ADMIN — Medication 200 MILLIGRAM(S): at 04:55

## 2022-06-19 RX ADMIN — POLYETHYLENE GLYCOL 3350 17 GRAM(S): 17 POWDER, FOR SOLUTION ORAL at 11:32

## 2022-06-19 RX ADMIN — TIOTROPIUM BROMIDE 1 CAPSULE(S): 18 CAPSULE ORAL; RESPIRATORY (INHALATION) at 04:55

## 2022-06-19 RX ADMIN — BUDESONIDE AND FORMOTEROL FUMARATE DIHYDRATE 2 PUFF(S): 160; 4.5 AEROSOL RESPIRATORY (INHALATION) at 04:58

## 2022-06-19 RX ADMIN — SENNA PLUS 2 TABLET(S): 8.6 TABLET ORAL at 22:09

## 2022-06-19 RX ADMIN — Medication 200 MILLIGRAM(S): at 17:30

## 2022-06-19 RX ADMIN — ATORVASTATIN CALCIUM 10 MILLIGRAM(S): 80 TABLET, FILM COATED ORAL at 22:09

## 2022-06-19 RX ADMIN — ENOXAPARIN SODIUM 40 MILLIGRAM(S): 100 INJECTION SUBCUTANEOUS at 17:30

## 2022-06-19 RX ADMIN — Medication 1 DROP(S): at 19:46

## 2022-06-19 NOTE — PROGRESS NOTE ADULT - ASSESSMENT
Pt is an 87 y.o. M wit ha PMH of HTN, HLD, COPD, Depression, Hep C, Duodenal ulcer diease, Lung nodules who is found to have subdural hygromas on head CT. He is being admitted for further work-up and management of falls. CT foot with L talar, calcaneal and phalanx fracture. No surgical intervention per podiatry. UA positive, being treated with Rocephin, awaiting for transition to [facility], medically stable for dc

## 2022-06-19 NOTE — PROGRESS NOTE ADULT - SUBJECTIVE AND OBJECTIVE BOX
[facility]  INFECTIOUS DISEASES   [address] [place] [address]  Tel: [phone]     Fax: [phone]  ======================================================  MD Quique Marroquin Kaushal, MD Cho, Michelle, MD   ======================================================    [id number]  CATHERINE NGUYEN     Follow up; UTI and left foot fracture     Doing better, less pain in left foot, has a boot. No more fever.  No urinary symptoms.     PAST MEDICAL & SURGICAL HISTORY:  Lung nodule, multiple  COPD (chronic obstructive pulmonary disease)  Depression  Emphysema  Duodenal ulcer disease  Hepatitis C  patients daughter states that she was told that the patient has hepitatis c  HLD (hyperlipidemia)  No significant past surgical history    Social Hx: no smoking, ETOH Or drugs     FAMILY HISTORY:  Family history of cerebral infarction (Father)  father  age 71    Allergies  No Known Allergies    Antibiotics:  MEDICATIONS  (STANDING):  atorvastatin 10 milliGRAM(s) Oral at bedtime  budesonide  80 MICROgram(s)/formoterol 4.5 MICROgram(s) Inhaler 2 Puff(s) Inhalation two times a day  cefTRIAXone   IVPB 1000 milliGRAM(s) IV Intermittent every 24 hours  enoxaparin Injectable 40 milliGRAM(s) SubCutaneous every 24 hours  montelukast 10 milliGRAM(s) Oral daily  sertraline 25 milliGRAM(s) Oral daily  tiotropium 18 MICROgram(s) Capsule 1 Capsule(s) Inhalation daily     REVIEW OF SYSTEMS:  CONSTITUTIONAL:  No Fever or chills  HEENT:  No diplopia or blurred vision.  No sore throat or runny nose.  CARDIOVASCULAR:  No chest pain or SOB.  RESPIRATORY:  No cough, shortness of breath, PND or orthopnea.  GASTROINTESTINAL:  No nausea, vomiting or diarrhea.  GENITOURINARY:  No dysuria, frequency or urgency. No Blood in urine  MUSCULOSKELETAL: Right foot pain   SKIN:  No change in skin, hair or nails.  NEUROLOGIC:  No paresthesias, fasciculations, seizures or weakness.  PSYCHIATRIC:  No disorder of thought or mood.  ENDOCRINE:  No heat or cold intolerance, polyuria or polydipsia.  HEMATOLOGICAL:  No easy bruising or bleeding.     Physical Exam:  Vital Signs Last 24 Hrs  T(C): 37 (2022 11:23), Max: 37.1 (2022 04:53)  T(F): 98.6 (2022 11:23), Max: 98.7 (2022 04:53)  HR: 65 (2022 11:23) (65 - 79)  BP: 122/73 (2022 11:23) (121/70 - 126/71)  BP(mean): --  RR: 19 (2022 11:23) (17 - 19)  SpO2: 91% (2022 11:23) (90% - 92%)  GEN: NAD  HEENT: normocephalic and atraumatic. EOMI. PERRL.    NECK: Supple.  No lymphadenopathy   LUNGS: Clear to auscultation.  HEART: Regular rate and rhythm without murmur.  ABDOMEN: Soft, nontender, and nondistended.  Positive bowel sounds.    : No CVA tenderness  EXTREMITIES: Without any cyanosis, clubbing, rash, lesions or edema.  NEUROLOGIC: grossly intact.  PSYCHIATRIC: Appropriate affect .  SKIN: No ulceration or induration present.      Labs:                        12.9   7.72  )-----------( 204      ( 2022 06:46 )             39.4     -    140  |  104  |  25<H>  ----------------------------<  138<H>  4.3   |  31  |  0.85    Ca    9.4      2022 06:46    Culture - Urine (collected 22 @ 12:52)  Source: Clean Catch Clean Catch (Midstream)  Final Report (22 @ 12:05):    >=3 organisms. Probable collection contamination.    Culture - Blood (collected 22 @ 03:38)  Source: .Blood Blood-Peripheral    Culture - Blood (collected 22 @ 03:38)  Source: .Blood Blood-Peripheral    WBC Count: 7.72 K/uL (22 @ 06:46)  WBC Count: 8.15 K/uL (22 @ 08:32)  WBC Count: 7.85 K/uL (22 @ 08:40)  WBC Count: 8.67 K/uL (22 @ 07:50)  WBC Count: 10.85 K/uL (06-15-22 @ 09:20)    Creatinine, Serum: 0.85 mg/dL (22 @ 06:46)  Creatinine, Serum: 0.90 mg/dL (22 @ 08:32)  Creatinine, Serum: 0.99 mg/dL (22 @ 08:40)  Creatinine, Serum: 0.93 mg/dL (22 @ 07:50)  Creatinine, Serum: 1.00 mg/dL (06-15-22 @ 09:20)    Procalcitonin, Serum: 0.14 ng/mL (06-15-22 @ 22:45)     COVID-19 PCR: NotDetec (22 @ 16:02)  COVID-19 PCR: NotDetec (22 @ 15:23)      All imaging and other data have been reviewed.  < from: Xray Chest 1 View- PORTABLE-Urgent (Xray Chest 1 View- PORTABLE-Urgent .) (06.15.22 @ 23:44) >  IMPRESSION:  No acute radiographic findings and no change    < from: CT Foot No Cont, Left (06.15.22 @ 09:05) >  IMPRESSION:  1. Mildly displaced cortical fracture involves the dorsum of the talar   head.  2. Nondisplaced fracture of the anterior process of the calcaneus with   nonosseous calcaneonavicular coalition.  3. Nondisplaced fracture of the 2nd proximal phalanx base.    Assessment and Plan:   86 yo man with PMH of HTN, COPD, HCV, depression and peptic ulcer was admitted after a fall and was found to have multiple fractures in left foot (2nd toe and talus and calcaneus).   His labs showed that he has abnormal UA + frequency, had a UTI few months ago S enterobacter.  ID has been called for fever that could be combination of UTI and fracture but usually reactive fever is not as high and simple cystitis shouldn't cause a high fever.     Recommendations:    - Blood culture x 2 NGTD  - UC with multiple bacteria but could be real   - Podiatry consult noted  - Was on ceftriaxone and now vantin 200mg q12 will complete total 5days tomorrow    - For possible rehab transfer, COVID booster in required, can be given before discharge    Will sign off please call with any question.     Reji Bull MD  Division of Infectious Diseases   Please call ID service at [phone] with any question.      35 minutes spent on total encounter assessing patient, examination, chart reivew, counseling and coordinating care by the attending physician/nurse/care manager.

## 2022-06-19 NOTE — PROGRESS NOTE ADULT - SUBJECTIVE AND OBJECTIVE BOX
Neurology follow up note  COVERING DR OXANA NGUYEN87yMale      Interval History:    Patient feels ok no new complaints.    Allergies    No Known Allergies    Intolerances        MEDICATIONS    acetaminophen     Tablet .. 650 milliGRAM(s) Oral every 6 hours PRN  ALBUTerol    90 MICROgram(s) HFA Inhaler 2 Puff(s) Inhalation every 6 hours PRN  artificial tears (preservative free) Ophthalmic Solution 1 Drop(s) Both EYES two times a day PRN  atorvastatin 10 milliGRAM(s) Oral at bedtime  budesonide  80 MICROgram(s)/formoterol 4.5 MICROgram(s) Inhaler 2 Puff(s) Inhalation two times a day  cefpodoxime 200 milliGRAM(s) Oral every 12 hours  enoxaparin Injectable 40 milliGRAM(s) SubCutaneous every 24 hours  montelukast 10 milliGRAM(s) Oral daily  polyethylene glycol 3350 17 Gram(s) Oral daily  senna 2 Tablet(s) Oral at bedtime  sertraline 25 milliGRAM(s) Oral daily  tiotropium 18 MICROgram(s) Capsule 1 Capsule(s) Inhalation daily              Vital Signs Last 24 Hrs  T(C): 37.1 (19 Jun 2022 04:53), Max: 37.1 (19 Jun 2022 04:53)  T(F): 98.7 (19 Jun 2022 04:53), Max: 98.7 (19 Jun 2022 04:53)  HR: 74 (19 Jun 2022 04:53) (74 - 79)  BP: 121/70 (19 Jun 2022 04:53) (121/70 - 126/71)  BP(mean): --  RR: 17 (19 Jun 2022 04:53) (17 - 17)  SpO2: 92% (19 Jun 2022 04:53) (90% - 92%)    REVIEW OF SYSTEMS:     Constitutional: No fever, chills, fatigue, weakness  Eyes: no eye pain, visual disturbances, or discharge  ENT:  No difficulty hearing, tinnitus, vertigo; No sinus or throat pain  Neck: No pain or stiffness  Respiratory: No cough, dyspnea, wheezing   Cardiovascular: No chest pain, palpitations,   Gastrointestinal: No abdominal or epigastric pain. No nausea, vomiting  No diarrhea or constipation.   Genitourinary: No dysuria, frequency, hematuria or incontinence  Neurological: No headaches, lightheadedness, vertigo, numbness or tremors  Psychiatric: No depression, anxiety, mood swings or difficulty sleeping  Musculoskeletal: No joint pain or swelling; No muscle, back or extremity pain  Skin: No itching, burning, rashes or lesions   Lymph Nodes: No enlarged glands  Endocrine: No heat or cold intolerance; No hair loss   Allergy and Immunologic: No hives or eczema    On Neurological Examination:    Mental Status - Patient is alert, awake, oriented X3      Follow simple commands      Speech -   Fluent      Cranial Nerves -   extraocular eye movements intact.   smile symmetric  intact bilateral NLF    Motor Exam -   positive movement of all 4 extremities left foot brace    Muscle tone - is normal all over.  No asymmetry is seen.      Sensory    Bilateral intact to light touch    GENERAL Exam: Nontoxic , No Acute Distress   	  HEENT:  normocephalic, atraumatic  		  LUNGS: Clear bilaterally  No Wheeze   	  HEART: Normal S1S2   No murmur RRR        	  GI/ ABDOMEN:  Soft  Non tender    EXTREMITIES:   No Edema  No Clubbing  No Cyanosis 	   SKIN: Normal  No Ecchymosis                    LABS:  CBC Full  -  ( 19 Jun 2022 06:46 )  WBC Count : 7.72 K/uL  RBC Count : 4.46 M/uL  Hemoglobin : 12.9 g/dL  Hematocrit : 39.4 %  Platelet Count - Automated : 204 K/uL  Mean Cell Volume : 88.3 fl  Mean Cell Hemoglobin : 28.9 pg  Mean Cell Hemoglobin Concentration : 32.7 gm/dL  Auto Neutrophil # : x  Auto Lymphocyte # : x  Auto Monocyte # : x  Auto Eosinophil # : x  Auto Basophil # : x  Auto Neutrophil % : x  Auto Lymphocyte % : x  Auto Monocyte % : x  Auto Eosinophil % : x  Auto Basophil % : x      06-19    140  |  104  |  25<H>  ----------------------------<  138<H>  4.3   |  31  |  0.85    Ca    9.4      19 Jun 2022 06:46      Hemoglobin A1C:       Vitamin B12         RADIOLOGY      RADIOLOGY    ASSESSMENT AND PLAN  FALL mechanical mri negative for cva   CHRONIC FRONTAL HYGROMA/HEMATOMA supportive treatment  left Foot fracture medical follow up   hyperlipidemia statin  fall precautions   30 minutes spent on total encounter; more than 50% of the visit was spent counseling and/or coordinating care by the attending physician.

## 2022-06-19 NOTE — PROGRESS NOTE ADULT - TIME BILLING
care coordination, plan of care discussed with patient face to face and daughter Mandie ph [phone] via pt's cell , 3E IDR team
care coordination, plan of care discussed with patient face to face
care coordination, plan of care discussed with patient face to face, 3E IDR
care coordination, plan of care discussed with patient face to face, 3E IDR team
care coordination, plan of care discussed with patient face to face, JONATHON Caba

## 2022-06-19 NOTE — PROGRESS NOTE ADULT - ATTENDING COMMENTS
Agree with above findings and plan
Agree with above findings and plan
87 y.o. M  PMH of HTN, HLD, COPD, Depression, Hep C, Duodenal ulcer diease, Lung nodules who is found to have subdural hygromas on head CT. He is being admitted for further work-up and management of falls. CT foot with L talar, calcaneal and phalanx fracture. No surgical intervention per podiatry. PLan: pt FLORENCIO PLATA, as per daughter Shilpa [phone] she would like father to go to [facility] for rehab if needed, apprec podiatry recs, apprec neuro recs, dc planning underway, apprec PT eval, apprec sw/cm coordination for DC
87 y.o. M wit ha PMH of HTN, HLD, COPD, Depression, Hep C, Duodenal ulcer diease, Lung nodules who is found to have subdural hygromas on head CT. He is being admitted for further work-up and management of falls. CT foot with L talar, calcaneal and phalanx fracture. No surgical intervention per podiatry. UA positive, being treated with Rocephin, awaiting for transition to [facility], medically stable for dc Plan: cont antibx, apprec ID recs, pt medically stable for dc, monitor clinical course
87 y.o. M wit ha PMH of HTN, HLD, COPD, Depression, Hep C, Duodenal ulcer diease, Lung nodules who is found to have subdural hygromas on head CT. He is being admitted for further work-up and management of falls. CT foot with L talar, calcaneal and phalanx fracture. No surgical intervention per podiatry. UA positive, being treated with Rocephin, awaiting for transition to [facility], medically stable for dc
87 y.o. M wit ha PMH of HTN, HLD, COPD, Depression, Hep C, Duodenal ulcer diease, Lung nodules who is found to have subdural hygromas on head CT. He is being admitted for further work-up and management of falls. CT foot with L talar, calcaneal and phalanx fracture. No surgical intervention per podiatry. UA positive, being treated with Rocephin. Plan: cont antibx as per ID, dc planning likely tomorrow to rehab, apprec podiatry recs, monitor clinical course, apprec sw in dc planning
87 y.o. M wit ha PMH of HTN, HLD, COPD, Depression, Hep C, Duodenal ulcer diease, Lung nodules who is found to have subdural hygromas on head CT. He is being admitted for further work-up and management of falls. CT foot with L talar, calcaneal and phalanx fracture. No surgical intervention per podiatry. UA positive, being treated with Rocephin. PLan: cont iv antibx for complicated uti, apprec ID recs, monitor clinical course, apprec podiatry recs, CAM boot ordered apprec orthotics recs, dc planning underway for [facility], f/u urine and blood cx

## 2022-06-19 NOTE — PROGRESS NOTE ADULT - SUBJECTIVE AND OBJECTIVE BOX
Patient is a 87y old  Male who presents with a chief complaint of Fall (19 Jun 2022 07:55)      INTERVAL HPI/OVERNIGHT EVENTS: Patient seen and examined at bedside. No overnight events occurred. Patient has no complaints at this time. Denies fevers, chills, headache, lightheadedness, chest pain, dyspnea, abdominal pain, n/v/d/c.    MEDICATIONS  (STANDING):  atorvastatin 10 milliGRAM(s) Oral at bedtime  budesonide  80 MICROgram(s)/formoterol 4.5 MICROgram(s) Inhaler 2 Puff(s) Inhalation two times a day  cefpodoxime 200 milliGRAM(s) Oral every 12 hours  enoxaparin Injectable 40 milliGRAM(s) SubCutaneous every 24 hours  montelukast 10 milliGRAM(s) Oral daily  polyethylene glycol 3350 17 Gram(s) Oral daily  senna 2 Tablet(s) Oral at bedtime  sertraline 25 milliGRAM(s) Oral daily  tiotropium 18 MICROgram(s) Capsule 1 Capsule(s) Inhalation daily    MEDICATIONS  (PRN):  acetaminophen     Tablet .. 650 milliGRAM(s) Oral every 6 hours PRN Temp greater or equal to 38C (100.4F), Mild Pain (1 - 3)  ALBUTerol    90 MICROgram(s) HFA Inhaler 2 Puff(s) Inhalation every 6 hours PRN Shortness of Breath and/or Wheezing  artificial tears (preservative free) Ophthalmic Solution 1 Drop(s) Both EYES two times a day PRN Dry Eyes      Allergies    No Known Allergies    Intolerances        REVIEW OF SYSTEMS:  CONSTITUTIONAL: No fever or chills  HEENT:  No headache, no sore throat  RESPIRATORY: No cough, wheezing, or shortness of breath  CARDIOVASCULAR: No chest pain, palpitations  GASTROINTESTINAL: No abd pain, nausea, vomiting, or diarrhea  GENITOURINARY: No dysuria, frequency, or hematuria  NEUROLOGICAL: no focal weakness or dizziness  MUSCULOSKELETAL: slight LLE pain w/ movement     Vital Signs Last 24 Hrs  T(C): 37 (19 Jun 2022 11:23), Max: 37.1 (19 Jun 2022 04:53)  T(F): 98.6 (19 Jun 2022 11:23), Max: 98.7 (19 Jun 2022 04:53)  HR: 65 (19 Jun 2022 11:23) (65 - 79)  BP: 122/73 (19 Jun 2022 11:23) (121/70 - 126/71)  BP(mean): --  RR: 19 (19 Jun 2022 11:23) (17 - 19)  SpO2: 91% (19 Jun 2022 11:23) (90% - 92%)    PHYSICAL EXAM:  GENERAL: NAD, eating breakfast   HEENT:  anicteric, moist mucous membranes  CHEST/LUNG:  CTA b/l, no rales, wheezes, or rhonchi  HEART:  RRR, S1, S2  ABDOMEN:  BS+, soft, nontender, nondistended  EXTREMITIES: LLE CAM boot in place, no edema, cyanosis, or calf tenderness  NERVOUS SYSTEM: answers questions and follows commands appropriately    LABS:                        12.9   7.72  )-----------( 204      ( 19 Jun 2022 06:46 )             39.4     CBC Full  -  ( 19 Jun 2022 06:46 )  WBC Count : 7.72 K/uL  Hemoglobin : 12.9 g/dL  Hematocrit : 39.4 %  Platelet Count - Automated : 204 K/uL  Mean Cell Volume : 88.3 fl  Mean Cell Hemoglobin : 28.9 pg  Mean Cell Hemoglobin Concentration : 32.7 gm/dL  Auto Neutrophil # : x  Auto Lymphocyte # : x  Auto Monocyte # : x  Auto Eosinophil # : x  Auto Basophil # : x  Auto Neutrophil % : x  Auto Lymphocyte % : x  Auto Monocyte % : x  Auto Eosinophil % : x  Auto Basophil % : x    19 Jun 2022 06:46    140    |  104    |  25     ----------------------------<  138    4.3     |  31     |  0.85     Ca    9.4        19 Jun 2022 06:46          CAPILLARY BLOOD GLUCOSE            Culture - Urine (collected 06-16-22 @ 12:52)  Source: Clean Catch Clean Catch (Midstream)  Final Report (06-17-22 @ 12:05):    >=3 organisms. Probable collection contamination.    Culture - Blood (collected 06-16-22 @ 03:38)  Source: .Blood Blood-Peripheral  Preliminary Report (06-17-22 @ 04:01):    No growth to date.    Culture - Blood (collected 06-16-22 @ 03:38)  Source: .Blood Blood-Peripheral  Preliminary Report (06-17-22 @ 04:01):    No growth to date.        RADIOLOGY & ADDITIONAL TESTS:    Personally reviewed.     Consultant(s) Notes Reviewed:  [x] YES  [ ] NO

## 2022-06-20 VITALS
OXYGEN SATURATION: 91 % | RESPIRATION RATE: 18 BRPM | HEART RATE: 65 BPM | DIASTOLIC BLOOD PRESSURE: 72 MMHG | TEMPERATURE: 99 F | SYSTOLIC BLOOD PRESSURE: 121 MMHG

## 2022-06-20 LAB
HCT VFR BLD CALC: 39.6 % — SIGNIFICANT CHANGE UP (ref 39–50)
HGB BLD-MCNC: 12.9 G/DL — LOW (ref 13–17)
MCHC RBC-ENTMCNC: 29.1 PG — SIGNIFICANT CHANGE UP (ref 27–34)
MCHC RBC-ENTMCNC: 32.6 GM/DL — SIGNIFICANT CHANGE UP (ref 32–36)
MCV RBC AUTO: 89.2 FL — SIGNIFICANT CHANGE UP (ref 80–100)
NRBC # BLD: 0 /100 WBCS — SIGNIFICANT CHANGE UP (ref 0–0)
PLATELET # BLD AUTO: 215 K/UL — SIGNIFICANT CHANGE UP (ref 150–400)
RBC # BLD: 4.44 M/UL — SIGNIFICANT CHANGE UP (ref 4.2–5.8)
RBC # FLD: 13.8 % — SIGNIFICANT CHANGE UP (ref 10.3–14.5)
SARS-COV-2 RNA SPEC QL NAA+PROBE: SIGNIFICANT CHANGE UP
WBC # BLD: 8.3 K/UL — SIGNIFICANT CHANGE UP (ref 3.8–10.5)
WBC # FLD AUTO: 8.3 K/UL — SIGNIFICANT CHANGE UP (ref 3.8–10.5)

## 2022-06-20 PROCEDURE — 73700 CT LOWER EXTREMITY W/O DYE: CPT

## 2022-06-20 PROCEDURE — 87635 SARS-COV-2 COVID-19 AMP PRB: CPT

## 2022-06-20 PROCEDURE — 97162 PT EVAL MOD COMPLEX 30 MIN: CPT

## 2022-06-20 PROCEDURE — 86900 BLOOD TYPING SEROLOGIC ABO: CPT

## 2022-06-20 PROCEDURE — 84145 PROCALCITONIN (PCT): CPT

## 2022-06-20 PROCEDURE — 85610 PROTHROMBIN TIME: CPT

## 2022-06-20 PROCEDURE — U0003: CPT

## 2022-06-20 PROCEDURE — 73630 X-RAY EXAM OF FOOT: CPT

## 2022-06-20 PROCEDURE — 87040 BLOOD CULTURE FOR BACTERIA: CPT

## 2022-06-20 PROCEDURE — 85025 COMPLETE CBC W/AUTO DIFF WBC: CPT

## 2022-06-20 PROCEDURE — 76376 3D RENDER W/INTRP POSTPROCES: CPT

## 2022-06-20 PROCEDURE — 97530 THERAPEUTIC ACTIVITIES: CPT

## 2022-06-20 PROCEDURE — 72074 X-RAY EXAM THORAC SPINE4/>VW: CPT

## 2022-06-20 PROCEDURE — 71045 X-RAY EXAM CHEST 1 VIEW: CPT

## 2022-06-20 PROCEDURE — 70450 CT HEAD/BRAIN W/O DYE: CPT

## 2022-06-20 PROCEDURE — 80053 COMPREHEN METABOLIC PANEL: CPT

## 2022-06-20 PROCEDURE — 85730 THROMBOPLASTIN TIME PARTIAL: CPT

## 2022-06-20 PROCEDURE — 82962 GLUCOSE BLOOD TEST: CPT

## 2022-06-20 PROCEDURE — 36415 COLL VENOUS BLD VENIPUNCTURE: CPT

## 2022-06-20 PROCEDURE — 80048 BASIC METABOLIC PNL TOTAL CA: CPT

## 2022-06-20 PROCEDURE — 70551 MRI BRAIN STEM W/O DYE: CPT

## 2022-06-20 PROCEDURE — 81001 URINALYSIS AUTO W/SCOPE: CPT

## 2022-06-20 PROCEDURE — U0005: CPT

## 2022-06-20 PROCEDURE — 72125 CT NECK SPINE W/O DYE: CPT | Mod: MA

## 2022-06-20 PROCEDURE — 97110 THERAPEUTIC EXERCISES: CPT

## 2022-06-20 PROCEDURE — 86850 RBC ANTIBODY SCREEN: CPT

## 2022-06-20 PROCEDURE — 85027 COMPLETE CBC AUTOMATED: CPT

## 2022-06-20 PROCEDURE — 94640 AIRWAY INHALATION TREATMENT: CPT

## 2022-06-20 PROCEDURE — 73610 X-RAY EXAM OF ANKLE: CPT

## 2022-06-20 PROCEDURE — 99239 HOSP IP/OBS DSCHRG MGMT >30: CPT | Mod: GC

## 2022-06-20 PROCEDURE — 87086 URINE CULTURE/COLONY COUNT: CPT

## 2022-06-20 PROCEDURE — 86901 BLOOD TYPING SEROLOGIC RH(D): CPT

## 2022-06-20 PROCEDURE — 99285 EMERGENCY DEPT VISIT HI MDM: CPT | Mod: 25

## 2022-06-20 RX ADMIN — TIOTROPIUM BROMIDE 1 CAPSULE(S): 18 CAPSULE ORAL; RESPIRATORY (INHALATION) at 06:14

## 2022-06-20 RX ADMIN — MONTELUKAST 10 MILLIGRAM(S): 4 TABLET, CHEWABLE ORAL at 11:26

## 2022-06-20 RX ADMIN — POLYETHYLENE GLYCOL 3350 17 GRAM(S): 17 POWDER, FOR SOLUTION ORAL at 11:26

## 2022-06-20 RX ADMIN — Medication 200 MILLIGRAM(S): at 06:13

## 2022-06-20 RX ADMIN — SERTRALINE 25 MILLIGRAM(S): 25 TABLET, FILM COATED ORAL at 11:27

## 2022-06-20 RX ADMIN — BUDESONIDE AND FORMOTEROL FUMARATE DIHYDRATE 2 PUFF(S): 160; 4.5 AEROSOL RESPIRATORY (INHALATION) at 06:14

## 2022-06-20 NOTE — PROGRESS NOTE ADULT - PROBLEM SELECTOR PLAN 2
Same as above.    WOUND CARE INSTRUCTIONS  1. Please keep CAM boot at all time. Pt may remove cam boot for showering and sleeping. Monitor for any signs of infection and present to the ED if they arise.  2. Patient is to be NON weight bearing to the RLE with cam boot and assited device.  3. Patient is to [facility] up [facility] with Dr. Singh or Dr. Mccain within 1-2 weeks upon discharge. Please call [phone] as soon as discharged.
CT head: subdural hygromas along the left frontal temporal convexity as well as the left lateral aspect of the posterior cranial fossa measuring up to 1 cm in thickness with regional mass effect  -repeat head CT  unchanged from prior   -if any acute change in neurologic status re-consult neurosurgery  -Neuro surgery consulted Dr. Freire - no need for transfer  -fall risk protocol  -brain MRI neg for infarcts or hemorrhage   -Neurology Dr Cuevas following, f/u recs
Same as above.    WOUND CARE INSTRUCTIONS  1. Please keep CAM boot at all time. Pt may remove cam boot for showering and sleeping. Monitor for any signs of infection and present to the ED if they arise.  2. Patient is to be NON weight bearing to the RLE with cam boot and assited device.  3. Patient is to follow up [facility] with Dr. Singh or Dr. Mccain within 1-2 weeks upon discharge. Please call [phone] as soon as discharged.
CT head: subdural hygromas along the left frontal temporal convexity as well as the left lateral aspect of the posterior cranial fossa measuring up to 1 cm in thickness with regional mass effect  -repeat head CT this AM unchanged from prior   -if any acute change in neurologic status re-consult neurosurgery  -Neuro surgery consulted Dr. Freire - no need for transfer  -neuro checks q 4  -fall risk protocol  -brain MRI ordered by Neuro, f/u results   -Neurology Dr Cuevas following, f/u recs
CT head: subdural hygromas along the left frontal temporal convexity as well as the left lateral aspect of the posterior cranial fossa measuring up to 1 cm in thickness with regional mass effect  -repeat head CT this AM unchanged from prior   -if any acute change in neurologic status re-consult neurosurgery  -Neuro surgery consulted Dr. Freire - no need for transfer  -neuro checks q 4  -fall risk protocol  -brain MRI neg for infarcts or hemorrhage   -Neurology Dr Cuevas following, f/u recs

## 2022-06-20 NOTE — PROGRESS NOTE ADULT - PROBLEM SELECTOR PROBLEM 3
HLD (hyperlipidemia)
Closed fracture of left toe
Acute UTI
Closed fracture of left toe
Acute UTI

## 2022-06-20 NOTE — PROGRESS NOTE ADULT - PROBLEM SELECTOR PLAN 1
s/p mechanical fall   -CT imaging - No acute intracranial pathology or MSK fractures. No C-spine/T-Spine pathology.  Subdural Hygromas findings with mass effect noted.  -left ankle xray - negative for acute fractures  -CT LLE positive for talar, calcaneal and 2nd phalanx fracture  -Podiatry consulted, no surgical intervention at this time. Non weight bearing on LLE   -Fall, safety precautions. Bed alarm  -Activity, ambulate with assistance  -Tylenol PRN for ankle pain.  -PT recommended [facility]
Pt seen and evaluated  - All films reviewed: LLE: Non-displaced fractures of talar head; Non-displaced anterior process of the calcaneus; Non-displaced fractures of the base of the proximal phalanx 2nd toe  - All fractures stable and non displaced  - No surgical intervention at this time. Pt is stable from podiatry standpoint.   - Pt refusing Ace dressing   - ACTIVITY: Pt to remain Non-weight bearing to the LLE with CAM boot  - Applied CAM Boot to left leg. Pt to keep boot on at all times and may remove it for sleep & shower  - Continue pain management  - Discharge planning per primary team.
Hx fall - No LOC, admits hitting head, has pain in left foot/ankle  CT imaging - No acute intracranial pathology or MSK fractures. No C-spine/T-Spine pathology.  Subdural Hygromas findings with mass effect noted.  -left ankle xray - negative for acute fractures  -CT LLE positive for talar, calcaneal and 2nd phalanx fracture  -Podiatry consulted, no surgical intervention at this time. Non weight bearing on LLE   -Fall, safety precautions. Bed alarm  -Activity, ambulate with assistance  -Tylenol PRN for ankle pain.  -PT consulted.
Pt seen and evaluated  - All films reviewed: LLE: Non-displaced fractures of talar head; Non-displaced anterior process of the calcaneus; Non-displaced fractures of the base of the proximal phalanx 2nd toe  - All fractures stable and non displaced  - No surgical intervention at this time. Pt is stable from podiatry standpoint.   - Pt refusing Ace dressing   - Pt to remain Non-weight bearing to the LLE  - f/u RLE CAM Boot from Mynor Miller with [facility] [phone]   - Continue pain management  - Discharge planning per primary team.
Hx fall - No LOC, admits hitting head, has pain in left foot/ankle  CT imaging - No acute intracranial pathology or MSK fractures. No C-spine/T-Spine pathology.  Subdural Hygromas findings with mass effect noted.  -left ankle xray - negative for acute fractures  -CT LLE positive for talar, calcaneal and 2nd phalanx fracture  -Podiatry consulted, no surgical intervention at this time. Non weight bearing on LLE   -Fall, safety precautions. Bed alarm  -Activity, ambulate with assistance  -Tylenol PRN for ankle pain.  -PT consulted.

## 2022-06-20 NOTE — PROGRESS NOTE ADULT - PROBLEM SELECTOR PROBLEM 4
HLD (hyperlipidemia)
HLD (hyperlipidemia)
History of COPD
HLD (hyperlipidemia)

## 2022-06-20 NOTE — PROGRESS NOTE ADULT - PROVIDER SPECIALTY LIST ADULT
Infectious Disease
Neurology
Hospitalist
Podiatry
Hospitalist
Podiatry
Hospitalist

## 2022-06-20 NOTE — PROGRESS NOTE ADULT - PROBLEM SELECTOR PLAN 6
DVT prophylaxis: will hold chemical dvt ppx pending repeat ct head, SCDs ordered
-Continue sertraline 25mg QD.

## 2022-06-20 NOTE — PROGRESS NOTE ADULT - SUBJECTIVE AND OBJECTIVE BOX
Patient is a 87y old  Male who presents with a chief complaint of Fall (20 Jun 2022 11:08)    INTERVAL HPI/OVERNIGHT EVENTS: Patient seen and examined at bedside. No overnight events occurred. Patient has no complaints at this time. Denies fevers, chills, headache, lightheadedness, chest pain, dyspnea, abdominal pain, n/v/d/c.    MEDICATIONS  (STANDING):  atorvastatin 10 milliGRAM(s) Oral at bedtime  budesonide  80 MICROgram(s)/formoterol 4.5 MICROgram(s) Inhaler 2 Puff(s) Inhalation two times a day  enoxaparin Injectable 40 milliGRAM(s) SubCutaneous every 24 hours  montelukast 10 milliGRAM(s) Oral daily  polyethylene glycol 3350 17 Gram(s) Oral daily  senna 2 Tablet(s) Oral at bedtime  sertraline 25 milliGRAM(s) Oral daily  tiotropium 18 MICROgram(s) Capsule 1 Capsule(s) Inhalation daily    MEDICATIONS  (PRN):  acetaminophen     Tablet .. 650 milliGRAM(s) Oral every 6 hours PRN Temp greater or equal to 38C (100.4F), Mild Pain (1 - 3)  ALBUTerol    90 MICROgram(s) HFA Inhaler 2 Puff(s) Inhalation every 6 hours PRN Shortness of Breath and/or Wheezing  artificial tears (preservative free) Ophthalmic Solution 1 Drop(s) Both EYES two times a day PRN Dry Eyes      Allergies    No Known Allergies    Intolerances        REVIEW OF SYSTEMS:  CONSTITUTIONAL: No fever or chills  HEENT:  No headache, no sore throat  RESPIRATORY: No cough, wheezing, or shortness of breath  CARDIOVASCULAR: No chest pain, palpitations  GASTROINTESTINAL: No abd pain, nausea, vomiting, or diarrhea  GENITOURINARY: No dysuria, frequency, or hematuria  NEUROLOGICAL: no focal weakness or dizziness  MUSCULOSKELETAL: no myalgias     Vital Signs Last 24 Hrs  T(C): 37.3 (20 Jun 2022 11:39), Max: 37.3 (20 Jun 2022 11:39)  T(F): 99.2 (20 Jun 2022 11:39), Max: 99.2 (20 Jun 2022 11:39)  HR: 65 (20 Jun 2022 11:39) (65 - 76)  BP: 121/72 (20 Jun 2022 11:39) (121/72 - 147/73)  BP(mean): --  RR: 18 (20 Jun 2022 11:39) (17 - 19)  SpO2: 91% (20 Jun 2022 11:39) (91% - 91%)    PHYSICAL EXAM:  GENERAL: NAD  HEENT:  anicteric, moist mucous membranes  CHEST/LUNG:  CTA b/l, no rales, wheezes, or rhonchi  HEART:  RRR, S1, S2  ABDOMEN:  BS+, soft, nontender, nondistended  EXTREMITIES: LLE CAM boot in place, no edema, cyanosis, or calf tenderness  NERVOUS SYSTEM: answers questions and follows commands appropriately    LABS:                        12.9   8.30  )-----------( 215      ( 20 Jun 2022 07:53 )             39.6     CBC Full  -  ( 20 Jun 2022 07:53 )  WBC Count : 8.30 K/uL  Hemoglobin : 12.9 g/dL  Hematocrit : 39.6 %  Platelet Count - Automated : 215 K/uL  Mean Cell Volume : 89.2 fl  Mean Cell Hemoglobin : 29.1 pg  Mean Cell Hemoglobin Concentration : 32.6 gm/dL  Auto Neutrophil # : x  Auto Lymphocyte # : x  Auto Monocyte # : x  Auto Eosinophil # : x  Auto Basophil # : x  Auto Neutrophil % : x  Auto Lymphocyte % : x  Auto Monocyte % : x  Auto Eosinophil % : x  Auto Basophil % : x    20 Jun 2022 07:53    139    |  102    |  26     ----------------------------<  133    4.3     |  31     |  0.83     Ca    9.4        20 Jun 2022 07:53        CAPILLARY BLOOD GLUCOSE      POCT Blood Glucose.: 141 mg/dL (20 Jun 2022 11:56)        Culture - Urine (collected 06-16-22 @ 12:52)  Source: Clean Catch Clean Catch (Midstream)  Final Report (06-17-22 @ 12:05):    >=3 organisms. Probable collection contamination.    Culture - Blood (collected 06-16-22 @ 03:38)  Source: .Blood Blood-Peripheral  Preliminary Report (06-17-22 @ 04:01):    No growth to date.    Culture - Blood (collected 06-16-22 @ 03:38)  Source: .Blood Blood-Peripheral  Preliminary Report (06-17-22 @ 04:01):    No growth to date.        RADIOLOGY & ADDITIONAL TESTS:    Personally reviewed.     Consultant(s) Notes Reviewed:  [x] YES  [ ] NO

## 2022-06-20 NOTE — PROGRESS NOTE ADULT - PROBLEM SELECTOR PLAN 3
Pt w/ dysuria and new fever o/n  - CXR negative for acute pathology, UA positive for nitrites, moderate leukocyte esterase and >50 WBCs  - procal 0.14   - Started on Rocephin for 3 days, f/u ucx & bcx   - Tylenol 650 mg PO q6h PRN fever or mild pain  - Trend WBC and monitor for fever  - ID (Dr. Bull) following, f/u recs
Pt w/ dysuria and new fever o/n  - CXR negative for acute pathology, UA positive for nitrites, moderate leukocyte esterase and >50 WBCs  - procal 0.14   - Started on Rocephin for 3 days, f/u ucx & bcx   - Tylenol 650 mg PO q6h PRN fever or mild pain  - Trend WBC and monitor for fever  - ID (Dr. Bull) following, f/u recs
Same as above.
Same as above.
-Continue home atorvastatin 10mg.
Pt w/ dysuria and new fever o/n  - CXR negative for acute pathology, UA positive for nitrites, moderate leukocyte esterase and >50 WBCs  - procal 0.14   - was on Rocephin, switched to vantin po 200mg BID until tomorrow 6/20 for a total of 5 days   - Tylenol 650 mg PO q6h PRN fever or mild pain  - Trend WBC and monitor for fever  - ID (Dr. Bull) following, f/u recs
Pt w/ dysuria and new fever o/n  - CXR negative for acute pathology, UA positive for nitrites, moderate leukocyte esterase and >50 WBCs  - procal 0.14   - was on Rocephin, switched to vantin po 200mg BID for 2 more days   - Tylenol 650 mg PO q6h PRN fever or mild pain  - Trend WBC and monitor for fever  - ID (Dr. Bull) following, f/u recs
Pt w/ dysuria and new fever o/n  - CXR negative for acute pathology, UA positive for nitrites, moderate leukocyte esterase and >50 WBCs  - procal 0.14   - was on Rocephin, switched to vantin po 200mg BID until tomorrow 6/20 for a total of 5 days   - Tylenol 650 mg PO q6h PRN fever or mild pain  - Trend WBC and monitor for fever  - ID (Dr. Bull) following, f/u recs

## 2022-06-20 NOTE — PROGRESS NOTE ADULT - PROBLEM SELECTOR PLAN 4
-Continue home atorvastatin 10mg.
chronic, stable  -will interchange symbicort for trelegy ellipta - daughter to bring in trelegy  -albuterol prn  -Continue montelukast

## 2022-06-20 NOTE — PROGRESS NOTE ADULT - ASSESSMENT
Pt is an 87 y.o. M wit ha PMH of HTN, HLD, COPD, Depression, Hep C, Duodenal ulcer diease, Lung nodules who is found to have subdural hygromas on head CT. He is being admitted for further work-up and management of falls. CT foot with L talar, calcaneal and phalanx fracture. No surgical intervention per podiatry. UA positive, being treated with Rocephin, awaiting for transition to [facility], medically stable for dc

## 2022-06-20 NOTE — PROGRESS NOTE ADULT - SUBJECTIVE AND OBJECTIVE BOX
Neurology Follow up note    CATHERINE [id number]    HPI:  Pt is an 87 y.o. M wit ha PMH of HTN, HLD, COPD, Depression, Hep C, Duodenal ulcer diease, Lung nodules who presented to the ED with a CC of falls. Patient states that 2 days ago he was walking down the stairs at his house when he slipped on his socks and fell. He states he hurt his left foot and hit the back of his head. Denies any LOC. Denies fever, chills, headache, dizziness, chest pain, shortness of breath, cough, nausea, vomiting, abdominal pain, diarrhea or constipation. Of note patients daughter states he has been having balance issues over the past few months and has fallen a few times.     In the ED:  T 98.5 HR 88 /66 RR 18 SpO2 99% on 4L NC  WBC 10.70 Glucose 129  Head CT Non-Con: No acute intracranial injury. No acute C-spine fracture. SUBDURAL HYGROMAS ALONG THE LEFT FRONTAL TEMPORAL CONVEXITY AS WELL AS   THE LEFT LATERAL ASPECT OF THE POSTERIOR CRANIAL FOSSA MEASURING UP TO 1 CM IN THICKNESS WITH REGIONAL MASS EFFECT.  Xray Foot, T-Spine, Chest, Ankle: Slight residual right lung densities. No acute bony findings.  Tylenol 650mg PO x 1 (14 Jun 2022 17:51)      Interval History -no ha/dizziness    Patient is seen, chart was reviewed and case was discussed with the treatment team.  Pt is not in any distress.   Lying on bed comfortably.       `Vital Signs Last 24 Hrs  T(C): 37 (20 Jun 2022 06:10), Max: 37 (19 Jun 2022 11:23)  T(F): 98.6 (20 Jun 2022 06:10), Max: 98.6 (19 Jun 2022 11:23)  HR: 76 (20 Jun 2022 06:10) (65 - 76)  BP: 147/73 (20 Jun 2022 06:10) (122/73 - 147/73)  BP(mean): --  RR: 17 (20 Jun 2022 06:10) (17 - 19)  SpO2: 91% (20 Jun 2022 06:10) (91% - 91%)        REVIEW OF SYSTEMS:    Constitutional: No fever, weight   Eyes: No eye pain, visual disturbances, or discharge  ENT:  No difficulty hearing, tinnitus, vertigo; No sinus or throat pain  Neck: No pain or stiffness  Respiratory: No cough, wheezing, chills or hemoptysis  Cardiovascular: No chest pain, palpitations, shortness of breath, dizziness or leg swelling  Gastrointestinal: No abdominal or epigastric pain. No nausea, vomiting or hematemesis;   Genitourinary: No dysuria, frequency, hematuria or incontinence  Neurological: No headache, loss of strength, numbness or tremors  Psychiatric: No depression, anxiety, mood swings or difficulty sleeping  Skin: No itching, burning, rashes or lesions   Lymph Nodes: No enlarged glands  Endocrine: No heat or cold intolerance; No hair loss,   Allergy and Immunologic: No hives or eczema    On Neurological Examination:    Mental Status - Pt is alert, awake, .oriented x 3 Follows commands well and able to answer questions appropriately.Mood and affect  normal    Speech -  Normal.     Cranial Nerves - Pupils 3 mm equal and reactive to light, extraocular eye movements intact. Pt has no visual field deficit.  Pt has no  facial asymmetry. Facial sensation is intact.Tongue - is in midline.    Muscle tone - is normal all over. Moves all extremities equally. No asymmetry is seen.      Motor Exam - 5/5 of UE  LE 4/5   No drift. No shaking or tremors.    Sensory Exam -. Pt withdraws all extremities equally on stimulation. No asymmetry seen. No complaints of tingling, numbness.        coordination:    Finger to nose: julio    Deep tendon Reflexes - 2 plus all over.     Neck Supple -  Yes.     MEDICATIONS  (STANDING):  atorvastatin 10 milliGRAM(s) Oral at bedtime  budesonide  80 MICROgram(s)/formoterol 4.5 MICROgram(s) Inhaler 2 Puff(s) Inhalation two times a day  enoxaparin Injectable 40 milliGRAM(s) SubCutaneous every 24 hours  montelukast 10 milliGRAM(s) Oral daily  polyethylene glycol 3350 17 Gram(s) Oral daily  senna 2 Tablet(s) Oral at bedtime  sertraline 25 milliGRAM(s) Oral daily  tiotropium 18 MICROgram(s) Capsule 1 Capsule(s) Inhalation daily    MEDICATIONS  (PRN):  acetaminophen     Tablet .. 650 milliGRAM(s) Oral every 6 hours PRN Temp greater or equal to 38C (100.4F), Mild Pain (1 - 3)  ALBUTerol    90 MICROgram(s) HFA Inhaler 2 Puff(s) Inhalation every 6 hours PRN Shortness of Breath and/or Wheezing  artificial tears (preservative free) Ophthalmic Solution 1 Drop(s) Both EYES two times a day PRN Dry Eyes        Allergies    No Known Allergies    Intolerances                        12.9   8.30  )-----------( 215      ( 20 Jun 2022 07:53 )             39.6   :50      Hemoglobin A1C:     Vitamin B12     RADIOLOGY    ASSESSMENT AND PLAN:      ABNORMALITY OF GAIT WITH FALL  NO ACUTE CVA  CHRONIC FRONTAL HYGROMA/HEMATOMA  left Foot fracture    for rehab  pain control  Physical therapy evaluation.  OOB to chair/ambulation with assistance only  Pain is accessed and addressed.  Would continue to follow.

## 2022-06-20 NOTE — PROGRESS NOTE ADULT - PROBLEM SELECTOR PROBLEM 2
Closed left calcaneal fracture
Traumatic subdural hygroma
Traumatic subdural hygroma
Closed left calcaneal fracture
Traumatic subdural hygroma

## 2022-06-20 NOTE — PROGRESS NOTE ADULT - PROBLEM SELECTOR PLAN 7
DVT prophylaxis: Lovenox 40mg sq q24h

## 2022-06-20 NOTE — PROGRESS NOTE ADULT - NUTRITIONAL ASSESSMENT
This patient has been assessed with a concern for Malnutrition and has been determined to have a diagnosis/diagnoses of Mild protein-calorie malnutrition and Underweight (BMI < 19).    This patient is being managed with:   Diet Regular-  Supplement Feeding Modality:  Oral  Ensure Enlive Cans or Servings Per Day:  1       Frequency:  Two Times a day  Entered: Jun 16 2022 12:01PM    
This patient has been assessed with a concern for Malnutrition and has been determined to have a diagnosis/diagnoses of Mild protein-calorie malnutrition and Underweight (BMI < 19).    This patient is being managed with:   Diet Regular-  Supplement Feeding Modality:  Oral  Ensure Enlive Cans or Servings Per Day:  1       Frequency:  Two Times a day  Entered: Jun 16 2022 12:01PM    
This patient has been assessed with a concern for Malnutrition and has been determined to have a diagnosis/diagnoses of Mild protein-calorie malnutrition and Underweight (BMI < 19).    This patient is being managed with:   Diet Regular-  Supplement Feeding Modality:  Oral  Ensure Enlive Cans or Servings Per Day:  1       Frequency:  Two Times a day  Entered: Jun 16 2022 12:01PM    Diet Regular-  Entered: Jun 14 2022  8:54PM    The following pending diet order is being considered for treatment of Mild protein-calorie malnutrition and Underweight (BMI < 19):null
This patient has been assessed with a concern for Malnutrition and has been determined to have a diagnosis/diagnoses of Mild protein-calorie malnutrition and Underweight (BMI < 19).    This patient is being managed with:   Diet Regular-  Supplement Feeding Modality:  Oral  Ensure Enlive Cans or Servings Per Day:  1       Frequency:  Two Times a day  Entered: Jun 16 2022 12:01PM    
This patient has been assessed with a concern for Malnutrition and has been determined to have a diagnosis/diagnoses of Mild protein-calorie malnutrition and Underweight (BMI < 19).    This patient is being managed with:   Diet Regular-  Supplement Feeding Modality:  Oral  Ensure Enlive Cans or Servings Per Day:  1       Frequency:  Two Times a day  Entered: Jun 16 2022 12:01PM

## 2022-06-21 LAB
CULTURE RESULTS: SIGNIFICANT CHANGE UP
CULTURE RESULTS: SIGNIFICANT CHANGE UP
SPECIMEN SOURCE: SIGNIFICANT CHANGE UP
SPECIMEN SOURCE: SIGNIFICANT CHANGE UP

## 2022-06-24 ENCOUNTER — APPOINTMENT (OUTPATIENT)
Dept: PODIATRY | Facility: CLINIC | Age: 87
End: 2022-06-24

## 2022-07-06 PROBLEM — E78.5 HYPERLIPIDEMIA, UNSPECIFIED: Chronic | Status: ACTIVE | Noted: 2022-06-14

## 2022-07-15 ENCOUNTER — OUTPATIENT (OUTPATIENT)
Dept: OUTPATIENT SERVICES | Facility: HOSPITAL | Age: 87
LOS: 1 days | Discharge: HOME | End: 2022-07-15
Payer: MEDICARE

## 2022-07-15 ENCOUNTER — APPOINTMENT (OUTPATIENT)
Dept: WOUND CARE | Facility: HOSPITAL | Age: 87
End: 2022-07-15

## 2022-07-15 VITALS
WEIGHT: 97 LBS | HEART RATE: 90 BPM | SYSTOLIC BLOOD PRESSURE: 145 MMHG | DIASTOLIC BLOOD PRESSURE: 91 MMHG | BODY MASS INDEX: 18.31 KG/M2 | OXYGEN SATURATION: 96 % | RESPIRATION RATE: 18 BRPM | HEIGHT: 61 IN | TEMPERATURE: 97.4 F

## 2022-07-15 DIAGNOSIS — E78.5 HYPERLIPIDEMIA, UNSPECIFIED: ICD-10-CM

## 2022-07-15 DIAGNOSIS — S92.014D NONDISPLACED FRACTURE OF BODY OF RIGHT CALCANEUS, SUBSEQUENT ENCOUNTER FOR FRACTURE WITH ROUTINE HEALING: ICD-10-CM

## 2022-07-15 DIAGNOSIS — Z63.4 DISAPPEARANCE AND DEATH OF FAMILY MEMBER: ICD-10-CM

## 2022-07-15 DIAGNOSIS — J44.9 CHRONIC OBSTRUCTIVE PULMONARY DISEASE, UNSPECIFIED: ICD-10-CM

## 2022-07-15 DIAGNOSIS — S92.124D NONDISPLACED FRACTURE OF BODY OF RIGHT TALUS, SUBSEQUENT ENCOUNTER FOR FRACTURE WITH ROUTINE HEALING: ICD-10-CM

## 2022-07-15 DIAGNOSIS — I10 ESSENTIAL (PRIMARY) HYPERTENSION: ICD-10-CM

## 2022-07-15 DIAGNOSIS — J43.9 EMPHYSEMA, UNSPECIFIED: ICD-10-CM

## 2022-07-15 DIAGNOSIS — S92.909A UNSPECIFIED FRACTURE OF UNSPECIFIED FOOT, INITIAL ENCOUNTER FOR CLOSED FRACTURE: ICD-10-CM

## 2022-07-15 DIAGNOSIS — F32.9 MAJOR DEPRESSIVE DISORDER, SINGLE EPISODE, UNSPECIFIED: ICD-10-CM

## 2022-07-15 PROCEDURE — 99213 OFFICE O/P EST LOW 20 MIN: CPT

## 2022-07-15 PROCEDURE — G0463: CPT

## 2022-07-15 RX ORDER — MAGNESIUM HYDROXIDE 400 MG/5ML
400 SUSPENSION ORAL
Refills: 0 | Status: ACTIVE | COMMUNITY

## 2022-07-15 RX ORDER — ALBUTEROL SULFATE 90 UG/1
108 (90 BASE) INHALANT RESPIRATORY (INHALATION)
Refills: 0 | Status: ACTIVE | COMMUNITY

## 2022-07-15 RX ORDER — BUDESONIDE AND FORMOTEROL FUMARATE DIHYDRATE 80; 4.5 UG/1; UG/1
80-4.5 AEROSOL RESPIRATORY (INHALATION)
Refills: 0 | Status: ACTIVE | COMMUNITY

## 2022-07-15 RX ORDER — ATORVASTATIN CALCIUM 10 MG/1
10 TABLET, FILM COATED ORAL
Qty: 90 | Refills: 0 | Status: ACTIVE | COMMUNITY
Start: 2021-07-02

## 2022-07-15 SDOH — SOCIAL STABILITY - SOCIAL INSECURITY: DISSAPEARANCE AND DEATH OF FAMILY MEMBER: Z63.4
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
## 2022-07-18 DIAGNOSIS — Z79.899 OTHER LONG TERM (CURRENT) DRUG THERAPY: ICD-10-CM
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
## 2022-07-18 DIAGNOSIS — Z85.118 PERSONAL HISTORY OF OTHER MALIGNANT NEOPLASM OF BRONCHUS AND LUNG: ICD-10-CM
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
## 2022-07-18 DIAGNOSIS — S92.124D: ICD-10-CM
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
## 2022-07-18 DIAGNOSIS — Z98.890 OTHER SPECIFIED POSTPROCEDURAL STATES: ICD-10-CM

## 2022-07-18 DIAGNOSIS — Z80.3 FAMILY HISTORY OF MALIGNANT NEOPLASM OF BREAST: ICD-10-CM

## 2022-07-18 DIAGNOSIS — S92.515D NONDISPLACED FRACTURE OF PROXIMAL PHALANX OF LEFT LESSER TOE(S), SUBSEQUENT ENCOUNTER FOR FRACTURE WITH ROUTINE HEALING: ICD-10-CM

## 2022-07-18 DIAGNOSIS — Z87.891 PERSONAL HISTORY OF NICOTINE DEPENDENCE: ICD-10-CM

## 2022-07-18 DIAGNOSIS — S92.014D NONDISPLACED FRACTURE OF BODY OF RIGHT CALCANEUS, SUBSEQUENT ENCOUNTER FOR FRACTURE WITH ROUTINE HEALING: ICD-10-CM

## 2022-07-18 DIAGNOSIS — B18.2 CHRONIC VIRAL HEPATITIS C: ICD-10-CM

## 2022-07-18 NOTE — PLAN
[FreeTextEntry1] : Patient examined and evaluated at this time.\par No pain to palpation or ROM at this time. Pt may transition to partial weight bearing in the CAM boot.\par All questions answered to satisfaction and patient and daughter verbalized understanding at this time.\par Spent 30 minutes for patient care and medical decision making.\par Patient to follow up in 4 weeks.\par

## 2022-07-18 NOTE — ASSESSMENT
[Verbal] : Verbal [Written] : Written [Patient] : Patient [Family member] : Family member [Fair - mild discomfort, physical impairment, low acceptance] : Fair - mild discomfort, physical impairment, low acceptance [Needs reinforcement] : needs reinforcement [Foot Care] : foot care [Skin Care] : skin care [Pressure relief] : pressure relief [Signs and symptoms of infection] : sign and symptoms of infection [How and When to Call] : how and when to call [Labs and Tests] : labs and tests [Off-loading] : off-loading [Patient responsibility to plan of care] : patient responsibility to plan of care [Stable] : stable [Home] : Home [Ambulatory] : Ambulatory [] : No [FreeTextEntry2] : Maintain Skin Integrity\par Offloading/Pressure relief\par Initiate PT\par F/U 1 month [FreeTextEntry4] : DPM cleared patient to begin physical therapy. Script was provided to family member for [facility].\par Patient advised to ambulate as tolerated. on the left foot. \par Patient's daughter stated that he currently resides at [facility] and had an xray completed on 7/6/22 but does not have the results.\par F/U 1 month

## 2022-07-18 NOTE — PHYSICAL EXAM
[de-identified] : A&Ox3, NAD [de-identified] : talar head, calcaneus and 2nd proximal phalanx nondisplaced closed fracture [de-identified] : no open lesions, no erythema, no ecchymosis, no lacerations, no maceration [de-identified] :  CAM boot  [FreeTextEntry1] : Foot- Fracture- No Open Wounds [de-identified] : No treatment required  [TWNoteComboBox1] : Left

## 2022-07-18 NOTE — VITALS
[Pain related to present condition?] : The patient's  pain is not related to present condition. [] : No [de-identified] : 0/10

## 2022-07-18 NOTE — REVIEW OF SYSTEMS
[Negative] : Heme/Lymph [FreeTextEntry9] : talar head, calcaneus and 2nd proximal phalanx nondisplaced closed fracture

## 2022-07-18 NOTE — HISTORY OF PRESENT ILLNESS
[FreeTextEntry1] : 87 year old male presents to the [facility] with  a left foot fracture. The patient's daughter reports that on 6/14/22 the patient fell and was brought to [facility] ED via ambulance for further assessment. An xray and CT scan were performed. The CT scan showed 3 fractures- talar head, calcaneus and 2nd proximal phalanx. PATY Mccain recommended the patient no apply pressure to the left foot for 6-8 weeks and follow up at the [facility] for care.

## 2022-08-31 ENCOUNTER — APPOINTMENT (OUTPATIENT)
Dept: WOUND CARE | Facility: HOSPITAL | Age: 87
End: 2022-08-31

## 2023-10-05 ENCOUNTER — APPOINTMENT (OUTPATIENT)
Dept: UROLOGY | Facility: CLINIC | Age: 88
End: 2023-10-05
Payer: MEDICARE

## 2023-10-05 VITALS
RESPIRATION RATE: 16 BRPM | HEIGHT: 60 IN | HEART RATE: 101 BPM | BODY MASS INDEX: 17.28 KG/M2 | SYSTOLIC BLOOD PRESSURE: 127 MMHG | WEIGHT: 88 LBS | DIASTOLIC BLOOD PRESSURE: 77 MMHG

## 2023-10-05 DIAGNOSIS — N13.8 BENIGN PROSTATIC HYPERPLASIA WITH LOWER URINARY TRACT SYMPMS: ICD-10-CM

## 2023-10-05 DIAGNOSIS — N40.1 BENIGN PROSTATIC HYPERPLASIA WITH LOWER URINARY TRACT SYMPMS: ICD-10-CM

## 2023-10-05 DIAGNOSIS — N32.3 DIVERTICULUM OF BLADDER: ICD-10-CM

## 2023-10-05 PROCEDURE — 99204 OFFICE O/P NEW MOD 45 MIN: CPT

## 2023-11-06 ENCOUNTER — OUTPATIENT (OUTPATIENT)
Dept: OUTPATIENT SERVICES | Facility: HOSPITAL | Age: 88
LOS: 1 days | End: 2023-11-06

## 2023-11-06 VITALS
TEMPERATURE: 98 F | RESPIRATION RATE: 16 BRPM | DIASTOLIC BLOOD PRESSURE: 70 MMHG | SYSTOLIC BLOOD PRESSURE: 108 MMHG | HEIGHT: 63 IN | HEART RATE: 97 BPM | WEIGHT: 87.96 LBS | OXYGEN SATURATION: 96 %

## 2023-11-06 DIAGNOSIS — C67.8 MALIGNANT NEOPLASM OF OVERLAPPING SITES OF BLADDER: ICD-10-CM

## 2023-11-06 DIAGNOSIS — F03.90 UNSPECIFIED DEMENTIA, UNSPECIFIED SEVERITY, WITHOUT BEHAVIORAL DISTURBANCE, PSYCHOTIC DISTURBANCE, MOOD DISTURBANCE, AND ANXIETY: ICD-10-CM

## 2023-11-06 DIAGNOSIS — Z98.890 OTHER SPECIFIED POSTPROCEDURAL STATES: Chronic | ICD-10-CM

## 2023-11-06 DIAGNOSIS — E11.9 TYPE 2 DIABETES MELLITUS WITHOUT COMPLICATIONS: ICD-10-CM

## 2023-11-06 DIAGNOSIS — J43.9 EMPHYSEMA, UNSPECIFIED: ICD-10-CM

## 2023-11-06 LAB
A1C WITH ESTIMATED AVERAGE GLUCOSE RESULT: 5.7 % — HIGH (ref 4–5.6)
A1C WITH ESTIMATED AVERAGE GLUCOSE RESULT: 5.7 % — HIGH (ref 4–5.6)
ALBUMIN SERPL ELPH-MCNC: 3.9 G/DL — SIGNIFICANT CHANGE UP (ref 3.3–5)
ALBUMIN SERPL ELPH-MCNC: 3.9 G/DL — SIGNIFICANT CHANGE UP (ref 3.3–5)
ALP SERPL-CCNC: 81 U/L — SIGNIFICANT CHANGE UP (ref 40–120)
ALP SERPL-CCNC: 81 U/L — SIGNIFICANT CHANGE UP (ref 40–120)
ALT FLD-CCNC: 5 U/L — SIGNIFICANT CHANGE UP (ref 4–41)
ALT FLD-CCNC: 5 U/L — SIGNIFICANT CHANGE UP (ref 4–41)
ANION GAP SERPL CALC-SCNC: 10 MMOL/L — SIGNIFICANT CHANGE UP (ref 7–14)
ANION GAP SERPL CALC-SCNC: 10 MMOL/L — SIGNIFICANT CHANGE UP (ref 7–14)
AST SERPL-CCNC: 13 U/L — SIGNIFICANT CHANGE UP (ref 4–40)
AST SERPL-CCNC: 13 U/L — SIGNIFICANT CHANGE UP (ref 4–40)
BILIRUB SERPL-MCNC: 0.2 MG/DL — SIGNIFICANT CHANGE UP (ref 0.2–1.2)
BILIRUB SERPL-MCNC: 0.2 MG/DL — SIGNIFICANT CHANGE UP (ref 0.2–1.2)
BUN SERPL-MCNC: 22 MG/DL — SIGNIFICANT CHANGE UP (ref 7–23)
BUN SERPL-MCNC: 22 MG/DL — SIGNIFICANT CHANGE UP (ref 7–23)
CALCIUM SERPL-MCNC: 9.2 MG/DL — SIGNIFICANT CHANGE UP (ref 8.4–10.5)
CALCIUM SERPL-MCNC: 9.2 MG/DL — SIGNIFICANT CHANGE UP (ref 8.4–10.5)
CHLORIDE SERPL-SCNC: 101 MMOL/L — SIGNIFICANT CHANGE UP (ref 98–107)
CHLORIDE SERPL-SCNC: 101 MMOL/L — SIGNIFICANT CHANGE UP (ref 98–107)
CO2 SERPL-SCNC: 25 MMOL/L — SIGNIFICANT CHANGE UP (ref 22–31)
CO2 SERPL-SCNC: 25 MMOL/L — SIGNIFICANT CHANGE UP (ref 22–31)
CREAT SERPL-MCNC: 0.91 MG/DL — SIGNIFICANT CHANGE UP (ref 0.5–1.3)
CREAT SERPL-MCNC: 0.91 MG/DL — SIGNIFICANT CHANGE UP (ref 0.5–1.3)
EGFR: 81 ML/MIN/1.73M2 — SIGNIFICANT CHANGE UP
EGFR: 81 ML/MIN/1.73M2 — SIGNIFICANT CHANGE UP
ESTIMATED AVERAGE GLUCOSE: 117 — SIGNIFICANT CHANGE UP
ESTIMATED AVERAGE GLUCOSE: 117 — SIGNIFICANT CHANGE UP
GLUCOSE SERPL-MCNC: 145 MG/DL — HIGH (ref 70–99)
GLUCOSE SERPL-MCNC: 145 MG/DL — HIGH (ref 70–99)
HCT VFR BLD CALC: 25.4 % — LOW (ref 39–50)
HCT VFR BLD CALC: 25.4 % — LOW (ref 39–50)
HGB BLD-MCNC: 7.8 G/DL — LOW (ref 13–17)
HGB BLD-MCNC: 7.8 G/DL — LOW (ref 13–17)
MCHC RBC-ENTMCNC: 24.7 PG — LOW (ref 27–34)
MCHC RBC-ENTMCNC: 24.7 PG — LOW (ref 27–34)
MCHC RBC-ENTMCNC: 30.7 GM/DL — LOW (ref 32–36)
MCHC RBC-ENTMCNC: 30.7 GM/DL — LOW (ref 32–36)
MCV RBC AUTO: 80.4 FL — SIGNIFICANT CHANGE UP (ref 80–100)
MCV RBC AUTO: 80.4 FL — SIGNIFICANT CHANGE UP (ref 80–100)
NRBC # BLD: 0 /100 WBCS — SIGNIFICANT CHANGE UP (ref 0–0)
NRBC # BLD: 0 /100 WBCS — SIGNIFICANT CHANGE UP (ref 0–0)
NRBC # FLD: 0 K/UL — SIGNIFICANT CHANGE UP (ref 0–0)
NRBC # FLD: 0 K/UL — SIGNIFICANT CHANGE UP (ref 0–0)
PLATELET # BLD AUTO: SIGNIFICANT CHANGE UP K/UL (ref 150–400)
PLATELET # BLD AUTO: SIGNIFICANT CHANGE UP K/UL (ref 150–400)
POTASSIUM SERPL-MCNC: 4.3 MMOL/L — SIGNIFICANT CHANGE UP (ref 3.5–5.3)
POTASSIUM SERPL-MCNC: 4.3 MMOL/L — SIGNIFICANT CHANGE UP (ref 3.5–5.3)
POTASSIUM SERPL-SCNC: 4.3 MMOL/L — SIGNIFICANT CHANGE UP (ref 3.5–5.3)
POTASSIUM SERPL-SCNC: 4.3 MMOL/L — SIGNIFICANT CHANGE UP (ref 3.5–5.3)
PROT SERPL-MCNC: 6.9 G/DL — SIGNIFICANT CHANGE UP (ref 6–8.3)
PROT SERPL-MCNC: 6.9 G/DL — SIGNIFICANT CHANGE UP (ref 6–8.3)
RBC # BLD: 3.16 M/UL — LOW (ref 4.2–5.8)
RBC # BLD: 3.16 M/UL — LOW (ref 4.2–5.8)
RBC # FLD: 15.4 % — HIGH (ref 10.3–14.5)
RBC # FLD: 15.4 % — HIGH (ref 10.3–14.5)
SODIUM SERPL-SCNC: 136 MMOL/L — SIGNIFICANT CHANGE UP (ref 135–145)
SODIUM SERPL-SCNC: 136 MMOL/L — SIGNIFICANT CHANGE UP (ref 135–145)
WBC # BLD: 11.14 K/UL — HIGH (ref 3.8–10.5)
WBC # BLD: 11.14 K/UL — HIGH (ref 3.8–10.5)
WBC # FLD AUTO: 11.14 K/UL — HIGH (ref 3.8–10.5)
WBC # FLD AUTO: 11.14 K/UL — HIGH (ref 3.8–10.5)

## 2023-11-06 RX ORDER — MEMANTINE HYDROCHLORIDE 10 MG/1
1 TABLET ORAL
Refills: 0 | DISCHARGE

## 2023-11-06 RX ORDER — ATORVASTATIN CALCIUM 80 MG/1
1 TABLET, FILM COATED ORAL
Refills: 0 | DISCHARGE

## 2023-11-06 RX ORDER — POLYETHYLENE GLYCOL 3350 17 G/17G
17 POWDER, FOR SOLUTION ORAL
Refills: 0 | DISCHARGE

## 2023-11-06 RX ORDER — MIRTAZAPINE 45 MG/1
0.5 TABLET, ORALLY DISINTEGRATING ORAL
Refills: 0 | DISCHARGE

## 2023-11-06 RX ORDER — ALBUTEROL 90 UG/1
3 AEROSOL, METERED ORAL
Refills: 0 | DISCHARGE

## 2023-11-06 RX ORDER — ALBUTEROL 90 UG/1
2 AEROSOL, METERED ORAL
Refills: 0 | DISCHARGE

## 2023-11-06 RX ORDER — SODIUM CHLORIDE 9 MG/ML
3 INJECTION INTRAMUSCULAR; INTRAVENOUS; SUBCUTANEOUS EVERY 8 HOURS
Refills: 0 | Status: DISCONTINUED | OUTPATIENT
Start: 2023-11-15 | End: 2023-11-15

## 2023-11-06 RX ORDER — METFORMIN HYDROCHLORIDE 850 MG/1
1 TABLET ORAL
Refills: 0 | DISCHARGE

## 2023-11-06 RX ORDER — TAMSULOSIN HYDROCHLORIDE 0.4 MG/1
1 CAPSULE ORAL
Refills: 0 | DISCHARGE

## 2023-11-06 RX ORDER — TIOTROPIUM BROMIDE 18 UG/1
1 CAPSULE ORAL; RESPIRATORY (INHALATION)
Refills: 0 | DISCHARGE

## 2023-11-06 RX ORDER — DOCUSATE SODIUM 100 MG
2 CAPSULE ORAL
Refills: 0 | DISCHARGE

## 2023-11-06 RX ORDER — BUDESONIDE AND FORMOTEROL FUMARATE DIHYDRATE 160; 4.5 UG/1; UG/1
2 AEROSOL RESPIRATORY (INHALATION)
Refills: 0 | DISCHARGE

## 2023-11-06 RX ORDER — SODIUM CHLORIDE 9 MG/ML
1000 INJECTION, SOLUTION INTRAVENOUS
Refills: 0 | Status: DISCONTINUED | OUTPATIENT
Start: 2023-11-15 | End: 2023-11-15

## 2023-11-06 RX ORDER — MONTELUKAST 4 MG/1
1 TABLET, CHEWABLE ORAL
Refills: 0 | DISCHARGE

## 2023-11-06 RX ORDER — CEFPODOXIME PROXETIL 100 MG
1 TABLET ORAL
Qty: 0 | Refills: 0 | DISCHARGE
End: 2022-06-20

## 2023-11-06 NOTE — H&P PST ADULT - PROBLEM SELECTOR PLAN 3
pt will use tiotropium & budesonide AM of surgery as prescribed  he will use Albuterol as needed  Last pulmonary visit note requested

## 2023-11-06 NOTE — H&P PST ADULT - NEGATIVE NEUROLOGICAL SYMPTOMS
no transient paralysis/no paresthesias/no generalized seizures/no focal seizures/no syncope/no tremors/no vertigo/no headache

## 2023-11-06 NOTE — H&P PST ADULT - MUSCULOSKELETAL
[facility]  [address] [place], [place] [address] [address]  Dept: [phone]  Loc: [phone]  Attending Progress Note      Reason for Visit: Right breast cancer. Referring Physician: Romelia Cintron MD    PCP:  Monique Palencia DO    History of Present Illness:     Antwan Phoenix is a pleasant 70-year-old female patient, with a past medical history significant for hearing loss and hyperlipidemia, who had presented with an abnormal screening mammogram,  MAMMOGRAM ULTRASOUND BIOPSY; [facility]       9/14/2021: MAMMOGRAM, RIGHT BREAST ULTRASOUND: [facility]                9/14/2021: LEFT BREAST ULTRASOUND: [facility]    She underwent an US guided right breast core biopsy at 3 o'clock position on September 20 , 2021. 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  Preliminary Allan   score 3+2+1 = 6     Comment:   Tumor cells stain strongly positive for e-cadherin   immunostain. Intradepartmental consultation is obtained.      Breast Cancer Marker Studies:         Estrogen Receptors (ER):       -Positive (>10% of cells demonstrate nuclear positivity):       Percentage of cells positive: >90%       Intensity: Strong         Progesterone Receptors (HI):       -Positive:       Percentage of cells positive: 2%       Intensity: Weak         Her-2/marquise (c-erb B-2) protein expression: Negative (1+)     The patient underwent on 11/3/2021 right breast lumpectomy with axillary sentinel lymph node biopsy, pathology:    CANCER CASE SUMMARY   Procedure -excision   Specimen Laterality -right   Tumor Site, Invasive Carcinoma-3:00   Histologic Type-invasive carcinoma, no special type (ductal)   Histologic Grade (Allan Histologic Score):                    Tubule differentiation- score-3                    Nuclear pleomorphism- score 2                    Mitotic rate- score 2                    Overall Grade- grade 2, (score 7)   Tumor Size: Size of Largest Invasive Carcinoma-30 mm   Ductal Carcinoma In Situ (DCIS)-not identified   Tumor Extent-carcinoma invades skeletal muscle   Treatment Effect in the Breast-no known presurgical therapy   Margin status for invasive carcinoma: Inferior margin involved by   invasive carcinoma.  Posterior margin less than 1 mm from invasive   carcinoma. Regional Lymph Nodes: All regional lymph nodes negative for tumor   Total number of lymph nodes examined-1   Number of sentinel nodes examined-1   Pathologic Stage Classification (AJCC 8th Edition)- pT2 p(sn)N0   Additional Pathologic Findings-lymphovascular and perineural space   invasion   Special studies-ER positive, ND positive, HER-2 negative     The patient underwent on 12/1/2021 lumpectomy margin reexcision, was negative for residual carcinoma. Oncotype DX was done, recurrence score is 26, distant recurrence risk at 9 years with endocrine therapy alone is 16%, absolute chemotherapy benefit is greater than 15%. Patient was started on adjuvant chemotherapy with Taxotere and Cytoxan on 1/5/2022, she is accompanied by her mother, she had nausea, her mother has been giving her the antiemetics, Zofran and Compazine as needed, also had bony pain. She has been keeping herself well-hydrated. Review of Systems;  CONSTITUTIONAL: No fever, chills. Good appetite and energy level. ENMT: Eyes: No diplopia; Nose: No epistaxis. Mouth: No sore throat. RESPIRATORY: No hemoptysis, shortness of breath, cough. CARDIOVASCULAR: No chest pain, palpitations. GASTROINTESTINAL: Positive for nausea, no abdominal pain, diarrhea/constipation. GENITOURINARY: No dysuria, urinary frequency, hematuria. NEURO: No syncope, presyncope, headache.   Remainder:  ROS NEGATIVE    Past Medical History:      Diagnosis Date    Breast cancer ([facility] [address]     Right breast    [facility] [address]     Hearing impaired     Hearing loss     Hyperlipidemia      Patient Active Problem List   Diagnosis    Cancer ([facility] [address]    Malignant neoplasm of upper-inner quadrant of right breast in female, estrogen receptor positive ([facility] [address]        Past Surgical History:      Procedure Laterality Date    BREAST LUMPECTOMY Right 11/3/2021    RIGHT BREAST LUMPECTOMY, BLUE DYE INJECTION, RIGHT AXILLARY SENTINEL LYMPH NODE INCISION performed by Ashleigh Alanis MD at . [facility] LUMPECTOMY Right 12/1/2021    RE-EXCISION RIGHT BREAST INFERIOR MARGIN performed by Ashleigh Alanis MD at [facility] RIGHT  2021       Family History:  Family History   Problem Relation Age of Onset    Heart Disease Mother     High Blood Pressure Mother     High Cholesterol Mother     Other Father         COPD    Dementia Maternal Grandmother        Medications:  Reviewed and reconciled. Social History:  Social History     Socioeconomic History    Marital status: Single     Spouse name: Not on file    Number of children: Not on file    Years of education: Not on file    Highest education level: Not on file   Occupational History    Not on file   Tobacco Use    Smoking status: Never Smoker    Smokeless tobacco: Never Used   Vaping Use    Vaping Use: Never used   Substance and Sexual Activity    Alcohol use: Never    Drug use: Never    Sexual activity: Not on file   Other Topics Concern    Not on file   Social History Narrative    Not on file     Social Determinants of Health     Financial Resource Strain:     Difficulty of Paying Living Expenses: Not on file   Food Insecurity:     Worried About [address] in the Last Year: Not on file    Rae of Food in the Last Year: Not on file   Transportation Needs:     Lack of Transportation (Medical): Not on file    Lack of Transportation (Non-Medical):  Not on file   Physical Activity:     Days of Exercise per Week: Not on file    Minutes of Exercise per Session: Not on file   Stress:     Feeling of Stress : Not on file   Social Connections:     Frequency of Communication with Friends and Family: Not on file    Frequency of Social Gatherings with Friends and Family: Not on file    Attends [employer] Services: Not on file    Active Member of [address] [place] [address] or Organizations: Not on file    Attends Club or Organization Meetings: Not on file    Marital Status: Not on file   Intimate Partner Violence:     Fear of Current or Ex-Partner: Not on file    Emotionally Abused: Not on file    Physically Abused: Not on file    Sexually Abused: Not on file   Housing Stability:     Unable to Pay for Housing in the Last Year: Not on file    Number of [place] in the Last Year: Not on file    Unstable Housing in the Last Year: Not on file       Allergies:  No Known Allergies    Physical Exam:  BP (!) 130/96   Pulse 117   Temp 97.8 °F (36.6 °C)   Resp 16   Ht 4' 8\" (1.422 m)   Wt 117 lb (53.1 kg)   LMP 10/28/2021   SpO2 99% Comment: room air  BMI 26.23 kg/m²   GENERAL: Alert, oriented x 3, not in acute distress. HEENT: PERRLA; EOMI. Oropharynx clear. NECK: Supple. No palpable cervical or supraclavicular lymphadenopathy. LUNGS: Good air entry bilaterally. No wheezing, crackles or rhonchi. CARDIOVASCULAR: Regular rate. No murmurs, rubs or gallops. ABDOMEN: Soft. Non-tender, non-distended. Positive bowel sounds. EXTREMITIES: Without clubbing, cyanosis, or edema. NEUROLOGIC: No focal deficits.      ECOG PS 1      Impression/Plan:    Alexandro Gilford is a pleasant 77-year-old female patient, with a past medical history significant for hearing loss and hyperlipidemia, who had presented with an abnormal screening mammogram, she was diagnosed with a right breast invasive ductal carcinoma, she underwent on 11/3/2021 right breast lumpectomy with axillary sentinel lymph node biopsy, tumor size was 3 cm, grade 2, carcinoma invaded skeletal muscle, inferior margin was positive, posterior margin was less than 1 mm from invasive carcinoma, she had margins reexcision done, was negative for residual carcinoma, 1 sentinel lymph node was removed, was negative for metastatic disease, final pathologic stage pT2 p(sn)N0, ER positive greater than 90%, PA +2%, HER-2/marquise negative, 1+ by IHC, Oncotype DX was done, recurrence score is 26, risk of distant recurrence at 9 years with endocrine therapy alone is 16%, chemotherapy benefit is greater than 15%. I discussed with the patient and her mother her diagnosis, characteristics of her tumor, prognosis and recommendations for treatment, adjuvant endocrine therapy with an AI is recommended, the side effects of the Arimidex were reviewed with the patient. We reviewed the Oncotype DX results, recurrence score is 26, adjuvant chemotherapy is recommended, benefit is greater than 15%, I discussed with the patient TC regimen, the side effects and the schedule were reviewed with he. The patient was started on adjuvant chemotherapy with Taxotere and Cytoxan on 1/5/2022, she is accompanied by her mother, she had nausea, her mother has been giving her the antiemetics, Zofran and Compazine as needed, also had bony pain secondary to Neulasta, labs reviewed, she has leukocytosis with neutrophilia secondary to G-CSF, creatinine electrolytes are within normal range, she has been keeping herself well-hydrated, continue with antiemetics as needed, Claritin and NSAIDs for bony pain. RTC in 2 weeks. Thank you for allowing us to participate in the care of Ms. Guzman.     Dg Senior MD   HEMATOLOGY/MEDICAL [address] [place] [address] MEDICAL ONCOLOGY  [address] [place] [id number]  Dept: [phone]: [phone] details… decreased strength

## 2023-11-06 NOTE — H&P PST ADULT - HISTORY OF PRESENT ILLNESS
89 y/o male presents to [facility] preop for cystoscopy, transurethral resection of bladder tumor.  As per daughter, pt with hematuria since May 2023. A recent cystoscopy revealed large bladder mass.

## 2023-11-06 NOTE — H&P PST ADULT - ASSESSMENT
87 y/o male presents to [facility] preop for cystoscopy, transurethral resection of bladder tumor.  As per daughter, pt with hematuria since May 2023. A recent cystoscopy revealed large bladder mass.

## 2023-11-06 NOTE — H&P PST ADULT - NEGATIVE HEMATOLOGY SYMPTOMS
no gum bleeding/no nose bleeding Sski Pregnancy And Lactation Text: This medication is Pregnancy Category D and isn't considered safe during pregnancy. It is excreted in breast milk.

## 2023-11-06 NOTE — H&P PST ADULT - PROBLEM SELECTOR PLAN 1
preop for cystoscopy, transurethral resection of bladder tumor on 11/6/23  preop instructions given, pt verbalized understanding  cbc, cmp, urine culture, hga1c pending    Medical evaluation with cardiac consult requested- 87 y/o male with multiple comorbidities and METS <4. Medical evaluation note with cardiac consult note in chart pending stress test results. Scheduled for exam on 11/9/23

## 2023-11-06 NOTE — H&P PST ADULT - OTHER CARE PROVIDERS
Cardiologist Dr. Brandt [phone]  Pulmonologist Dr. Gil 416- Cardiologist Dr. Brandt [phone]  Pulmonologist Dr. Gil [phone]

## 2023-11-06 NOTE — H&P PST ADULT - NSICDXPASTMEDICALHX_GEN_ALL_CORE_FT
PAST MEDICAL HISTORY:  Calcification of aorta     COPD (chronic obstructive pulmonary disease)     Dementia     Depression     Duodenal ulcer     Duodenal ulcer disease     Elevated LFTs     Emphysema     Hepatitis C patients daughter states that she was told that the patient has hepitatis c    History of hepatitis C     HLD (hyperlipidemia)     HTN (hypertension)     Lung cancer     Lung nodule, multiple     Malignant neoplasm of overlapping sites of bladder     Type 2 diabetes mellitus     Vitamin D deficiency      PAST MEDICAL HISTORY:  Calcification of aorta     COPD (chronic obstructive pulmonary disease)     Dementia     Depression     Duodenal ulcer     Duodenal ulcer disease     Elevated LFTs     Emphysema     H/O: depression     Hepatitis C patients daughter states that she was told that the patient has hepitatis c    History of hepatitis C     HLD (hyperlipidemia)     HTN (hypertension)     Lung cancer     Lung nodule, multiple     Malignant neoplasm of overlapping sites of bladder     Type 2 diabetes mellitus     Vitamin D deficiency

## 2023-11-06 NOTE — H&P PST ADULT - GEN GEN HX ROS MEA POS PC
17 lbs weight loss since June 2023- loss of appetite/weight loss/weakness 17 lbs weight loss since June 2023 r/t loss of appetite/weight loss/weakness

## 2023-11-08 LAB
-  AMOXICILLIN/CLAVULANIC ACID: SIGNIFICANT CHANGE UP
-  AMOXICILLIN/CLAVULANIC ACID: SIGNIFICANT CHANGE UP
-  AMPICILLIN/SULBACTAM: SIGNIFICANT CHANGE UP
-  AMPICILLIN/SULBACTAM: SIGNIFICANT CHANGE UP
-  AMPICILLIN: SIGNIFICANT CHANGE UP
-  AMPICILLIN: SIGNIFICANT CHANGE UP
-  AZTREONAM: SIGNIFICANT CHANGE UP
-  AZTREONAM: SIGNIFICANT CHANGE UP
-  CEFAZOLIN: SIGNIFICANT CHANGE UP
-  CEFAZOLIN: SIGNIFICANT CHANGE UP
-  CEFEPIME: SIGNIFICANT CHANGE UP
-  CEFEPIME: SIGNIFICANT CHANGE UP
-  CEFOTAXIME: SIGNIFICANT CHANGE UP
-  CEFOTAXIME: SIGNIFICANT CHANGE UP
-  CEFOXITIN: SIGNIFICANT CHANGE UP
-  CEFOXITIN: SIGNIFICANT CHANGE UP
-  CEFTAZIDIME: SIGNIFICANT CHANGE UP
-  CEFTAZIDIME: SIGNIFICANT CHANGE UP
-  CEFTRIAXONE: SIGNIFICANT CHANGE UP
-  CEFTRIAXONE: SIGNIFICANT CHANGE UP
-  CIPROFLOXACIN: SIGNIFICANT CHANGE UP
-  CIPROFLOXACIN: SIGNIFICANT CHANGE UP
-  ERTAPENEM: SIGNIFICANT CHANGE UP
-  ERTAPENEM: SIGNIFICANT CHANGE UP
-  GENTAMICIN: SIGNIFICANT CHANGE UP
-  GENTAMICIN: SIGNIFICANT CHANGE UP
-  IMIPENEM: SIGNIFICANT CHANGE UP
-  IMIPENEM: SIGNIFICANT CHANGE UP
-  LEVOFLOXACIN: SIGNIFICANT CHANGE UP
-  LEVOFLOXACIN: SIGNIFICANT CHANGE UP
-  MEROPENEM: SIGNIFICANT CHANGE UP
-  MEROPENEM: SIGNIFICANT CHANGE UP
-  NITROFURANTOIN: SIGNIFICANT CHANGE UP
-  NITROFURANTOIN: SIGNIFICANT CHANGE UP
-  PIPERACILLIN/TAZOBACTAM: SIGNIFICANT CHANGE UP
-  PIPERACILLIN/TAZOBACTAM: SIGNIFICANT CHANGE UP
-  TOBRAMYCIN: SIGNIFICANT CHANGE UP
-  TOBRAMYCIN: SIGNIFICANT CHANGE UP
-  TRIMETHOPRIM/SULFAMETHOXAZOLE: SIGNIFICANT CHANGE UP
-  TRIMETHOPRIM/SULFAMETHOXAZOLE: SIGNIFICANT CHANGE UP
CULTURE RESULTS: ABNORMAL
CULTURE RESULTS: ABNORMAL
METHOD TYPE: SIGNIFICANT CHANGE UP
METHOD TYPE: SIGNIFICANT CHANGE UP
ORGANISM # SPEC MICROSCOPIC CNT: ABNORMAL
SPECIMEN SOURCE: SIGNIFICANT CHANGE UP
SPECIMEN SOURCE: SIGNIFICANT CHANGE UP

## 2023-11-13 ENCOUNTER — OUTPATIENT (OUTPATIENT)
Dept: OUTPATIENT SERVICES | Facility: HOSPITAL | Age: 88
LOS: 1 days | End: 2023-11-13

## 2023-11-13 DIAGNOSIS — Z98.890 OTHER SPECIFIED POSTPROCEDURAL STATES: Chronic | ICD-10-CM

## 2023-11-13 DIAGNOSIS — C67.8 MALIGNANT NEOPLASM OF OVERLAPPING SITES OF BLADDER: ICD-10-CM

## 2023-11-13 PROBLEM — I10 ESSENTIAL (PRIMARY) HYPERTENSION: Chronic | Status: ACTIVE | Noted: 2023-11-06

## 2023-11-13 PROBLEM — E11.9 TYPE 2 DIABETES MELLITUS WITHOUT COMPLICATIONS: Chronic | Status: ACTIVE | Noted: 2023-11-06

## 2023-11-13 PROBLEM — E55.9 VITAMIN D DEFICIENCY, UNSPECIFIED: Chronic | Status: ACTIVE | Noted: 2023-11-06

## 2023-11-13 PROBLEM — I70.0 ATHEROSCLEROSIS OF AORTA: Chronic | Status: ACTIVE | Noted: 2023-11-06

## 2023-11-13 PROBLEM — Z86.59 PERSONAL HISTORY OF OTHER MENTAL AND BEHAVIORAL DISORDERS: Chronic | Status: ACTIVE | Noted: 2023-11-06

## 2023-11-13 PROBLEM — K26.9 DUODENAL ULCER, UNSPECIFIED AS ACUTE OR CHRONIC, WITHOUT HEMORRHAGE OR PERFORATION: Chronic | Status: ACTIVE | Noted: 2023-11-06

## 2023-11-13 PROBLEM — Z86.19 PERSONAL HISTORY OF OTHER INFECTIOUS AND PARASITIC DISEASES: Chronic | Status: ACTIVE | Noted: 2023-11-06

## 2023-11-13 PROBLEM — F03.90 UNSPECIFIED DEMENTIA, UNSPECIFIED SEVERITY, WITHOUT BEHAVIORAL DISTURBANCE, PSYCHOTIC DISTURBANCE, MOOD DISTURBANCE, AND ANXIETY: Chronic | Status: ACTIVE | Noted: 2023-11-06

## 2023-11-13 PROBLEM — R79.89 OTHER SPECIFIED ABNORMAL FINDINGS OF BLOOD CHEMISTRY: Chronic | Status: ACTIVE | Noted: 2023-11-06

## 2023-11-13 PROBLEM — C34.90 MALIGNANT NEOPLASM OF UNSPECIFIED PART OF UNSPECIFIED BRONCHUS OR LUNG: Chronic | Status: ACTIVE | Noted: 2023-11-06

## 2023-11-13 LAB
CLOSURE TME COLL+EPINEP BLD: 309 K/UL — SIGNIFICANT CHANGE UP (ref 150–400)
CLOSURE TME COLL+EPINEP BLD: 309 K/UL — SIGNIFICANT CHANGE UP (ref 150–400)

## 2023-11-14 ENCOUNTER — TRANSCRIPTION ENCOUNTER (OUTPATIENT)
Age: 88
End: 2023-11-14

## 2023-11-14 NOTE — ASU PATIENT PROFILE, ADULT - FALL HARM RISK - HARM RISK INTERVENTIONS

## 2023-11-15 ENCOUNTER — APPOINTMENT (OUTPATIENT)
Dept: UROLOGY | Facility: HOSPITAL | Age: 88
End: 2023-11-15

## 2023-11-15 ENCOUNTER — RESULT REVIEW (OUTPATIENT)
Age: 88
End: 2023-11-15

## 2023-11-15 ENCOUNTER — INPATIENT (INPATIENT)
Facility: HOSPITAL | Age: 88
LOS: 4 days | Discharge: NURSING, REHAB OR LONG-TERM CARE | End: 2023-11-20
Attending: UROLOGY | Admitting: UROLOGY
Payer: MEDICARE

## 2023-11-15 VITALS
OXYGEN SATURATION: 94 % | SYSTOLIC BLOOD PRESSURE: 104 MMHG | RESPIRATION RATE: 18 BRPM | WEIGHT: 85.98 LBS | HEART RATE: 102 BPM | TEMPERATURE: 98 F | DIASTOLIC BLOOD PRESSURE: 79 MMHG | HEIGHT: 64 IN

## 2023-11-15 DIAGNOSIS — C67.8 MALIGNANT NEOPLASM OF OVERLAPPING SITES OF BLADDER: ICD-10-CM

## 2023-11-15 DIAGNOSIS — C67.9 MALIGNANT NEOPLASM OF BLADDER, UNSPECIFIED: ICD-10-CM

## 2023-11-15 DIAGNOSIS — Z98.890 OTHER SPECIFIED POSTPROCEDURAL STATES: Chronic | ICD-10-CM

## 2023-11-15 LAB
ANION GAP SERPL CALC-SCNC: 15 MMOL/L — HIGH (ref 7–14)
ANION GAP SERPL CALC-SCNC: 15 MMOL/L — HIGH (ref 7–14)
BLD GP AB SCN SERPL QL: NEGATIVE — SIGNIFICANT CHANGE UP
BLD GP AB SCN SERPL QL: NEGATIVE — SIGNIFICANT CHANGE UP
BUN SERPL-MCNC: 26 MG/DL — HIGH (ref 7–23)
BUN SERPL-MCNC: 26 MG/DL — HIGH (ref 7–23)
CALCIUM SERPL-MCNC: 9.5 MG/DL — SIGNIFICANT CHANGE UP (ref 8.4–10.5)
CALCIUM SERPL-MCNC: 9.5 MG/DL — SIGNIFICANT CHANGE UP (ref 8.4–10.5)
CHLORIDE SERPL-SCNC: 103 MMOL/L — SIGNIFICANT CHANGE UP (ref 98–107)
CHLORIDE SERPL-SCNC: 103 MMOL/L — SIGNIFICANT CHANGE UP (ref 98–107)
CO2 SERPL-SCNC: 20 MMOL/L — LOW (ref 22–31)
CO2 SERPL-SCNC: 20 MMOL/L — LOW (ref 22–31)
CREAT SERPL-MCNC: 1.15 MG/DL — SIGNIFICANT CHANGE UP (ref 0.5–1.3)
CREAT SERPL-MCNC: 1.15 MG/DL — SIGNIFICANT CHANGE UP (ref 0.5–1.3)
EGFR: 61 ML/MIN/1.73M2 — SIGNIFICANT CHANGE UP
EGFR: 61 ML/MIN/1.73M2 — SIGNIFICANT CHANGE UP
GLUCOSE BLDC GLUCOMTR-MCNC: 147 MG/DL — HIGH (ref 70–99)
GLUCOSE BLDC GLUCOMTR-MCNC: 147 MG/DL — HIGH (ref 70–99)
GLUCOSE BLDC GLUCOMTR-MCNC: 181 MG/DL — HIGH (ref 70–99)
GLUCOSE BLDC GLUCOMTR-MCNC: 181 MG/DL — HIGH (ref 70–99)
GLUCOSE SERPL-MCNC: 185 MG/DL — HIGH (ref 70–99)
GLUCOSE SERPL-MCNC: 185 MG/DL — HIGH (ref 70–99)
HCT VFR BLD CALC: 27.9 % — LOW (ref 39–50)
HCT VFR BLD CALC: 27.9 % — LOW (ref 39–50)
HCT VFR BLD CALC: 31.8 % — LOW (ref 39–50)
HCT VFR BLD CALC: 31.8 % — LOW (ref 39–50)
HGB BLD-MCNC: 8.2 G/DL — LOW (ref 13–17)
HGB BLD-MCNC: 8.2 G/DL — LOW (ref 13–17)
HGB BLD-MCNC: 9.5 G/DL — LOW (ref 13–17)
HGB BLD-MCNC: 9.5 G/DL — LOW (ref 13–17)
MCHC RBC-ENTMCNC: 23.7 PG — LOW (ref 27–34)
MCHC RBC-ENTMCNC: 23.7 PG — LOW (ref 27–34)
MCHC RBC-ENTMCNC: 24.3 PG — LOW (ref 27–34)
MCHC RBC-ENTMCNC: 24.3 PG — LOW (ref 27–34)
MCHC RBC-ENTMCNC: 29.4 GM/DL — LOW (ref 32–36)
MCHC RBC-ENTMCNC: 29.4 GM/DL — LOW (ref 32–36)
MCHC RBC-ENTMCNC: 29.9 GM/DL — LOW (ref 32–36)
MCHC RBC-ENTMCNC: 29.9 GM/DL — LOW (ref 32–36)
MCV RBC AUTO: 80.6 FL — SIGNIFICANT CHANGE UP (ref 80–100)
MCV RBC AUTO: 80.6 FL — SIGNIFICANT CHANGE UP (ref 80–100)
MCV RBC AUTO: 81.3 FL — SIGNIFICANT CHANGE UP (ref 80–100)
MCV RBC AUTO: 81.3 FL — SIGNIFICANT CHANGE UP (ref 80–100)
NRBC # BLD: 0 /100 WBCS — SIGNIFICANT CHANGE UP (ref 0–0)
NRBC # FLD: 0 K/UL — SIGNIFICANT CHANGE UP (ref 0–0)
PLATELET # BLD AUTO: 228 K/UL — SIGNIFICANT CHANGE UP (ref 150–400)
PLATELET # BLD AUTO: 228 K/UL — SIGNIFICANT CHANGE UP (ref 150–400)
PLATELET # BLD AUTO: 283 K/UL — SIGNIFICANT CHANGE UP (ref 150–400)
PLATELET # BLD AUTO: 283 K/UL — SIGNIFICANT CHANGE UP (ref 150–400)
POTASSIUM SERPL-MCNC: 5.2 MMOL/L — SIGNIFICANT CHANGE UP (ref 3.5–5.3)
POTASSIUM SERPL-MCNC: 5.2 MMOL/L — SIGNIFICANT CHANGE UP (ref 3.5–5.3)
POTASSIUM SERPL-SCNC: 5.2 MMOL/L — SIGNIFICANT CHANGE UP (ref 3.5–5.3)
POTASSIUM SERPL-SCNC: 5.2 MMOL/L — SIGNIFICANT CHANGE UP (ref 3.5–5.3)
RBC # BLD: 3.46 M/UL — LOW (ref 4.2–5.8)
RBC # BLD: 3.46 M/UL — LOW (ref 4.2–5.8)
RBC # BLD: 3.91 M/UL — LOW (ref 4.2–5.8)
RBC # BLD: 3.91 M/UL — LOW (ref 4.2–5.8)
RBC # FLD: 16 % — HIGH (ref 10.3–14.5)
RBC # FLD: 16 % — HIGH (ref 10.3–14.5)
RBC # FLD: 16.1 % — HIGH (ref 10.3–14.5)
RBC # FLD: 16.1 % — HIGH (ref 10.3–14.5)
RH IG SCN BLD-IMP: POSITIVE — SIGNIFICANT CHANGE UP
RH IG SCN BLD-IMP: POSITIVE — SIGNIFICANT CHANGE UP
SODIUM SERPL-SCNC: 138 MMOL/L — SIGNIFICANT CHANGE UP (ref 135–145)
SODIUM SERPL-SCNC: 138 MMOL/L — SIGNIFICANT CHANGE UP (ref 135–145)
WBC # BLD: 10.78 K/UL — HIGH (ref 3.8–10.5)
WBC # BLD: 10.78 K/UL — HIGH (ref 3.8–10.5)
WBC # BLD: 13.19 K/UL — HIGH (ref 3.8–10.5)
WBC # BLD: 13.19 K/UL — HIGH (ref 3.8–10.5)
WBC # FLD AUTO: 10.78 K/UL — HIGH (ref 3.8–10.5)
WBC # FLD AUTO: 10.78 K/UL — HIGH (ref 3.8–10.5)
WBC # FLD AUTO: 13.19 K/UL — HIGH (ref 3.8–10.5)
WBC # FLD AUTO: 13.19 K/UL — HIGH (ref 3.8–10.5)

## 2023-11-15 PROCEDURE — 88307 TISSUE EXAM BY PATHOLOGIST: CPT | Mod: 26

## 2023-11-15 PROCEDURE — 52240 CYSTOSCOPY AND TREATMENT: CPT

## 2023-11-15 RX ORDER — MONTELUKAST 4 MG/1
10 TABLET, CHEWABLE ORAL DAILY
Refills: 0 | Status: DISCONTINUED | OUTPATIENT
Start: 2023-11-15 | End: 2023-11-20

## 2023-11-15 RX ORDER — POLYETHYLENE GLYCOL 3350 17 G/17G
17 POWDER, FOR SOLUTION ORAL DAILY
Refills: 0 | Status: DISCONTINUED | OUTPATIENT
Start: 2023-11-15 | End: 2023-11-20

## 2023-11-15 RX ORDER — BUDESONIDE AND FORMOTEROL FUMARATE DIHYDRATE 160; 4.5 UG/1; UG/1
2 AEROSOL RESPIRATORY (INHALATION)
Refills: 0 | Status: DISCONTINUED | OUTPATIENT
Start: 2023-11-15 | End: 2023-11-20

## 2023-11-15 RX ORDER — FENTANYL CITRATE 50 UG/ML
25 INJECTION INTRAVENOUS
Refills: 0 | Status: DISCONTINUED | OUTPATIENT
Start: 2023-11-15 | End: 2023-11-16

## 2023-11-15 RX ORDER — DEXTROSE 50 % IN WATER 50 %
25 SYRINGE (ML) INTRAVENOUS ONCE
Refills: 0 | Status: DISCONTINUED | OUTPATIENT
Start: 2023-11-15 | End: 2023-11-20

## 2023-11-15 RX ORDER — MIRTAZAPINE 45 MG/1
7.5 TABLET, ORALLY DISINTEGRATING ORAL AT BEDTIME
Refills: 0 | Status: DISCONTINUED | OUTPATIENT
Start: 2023-11-15 | End: 2023-11-16

## 2023-11-15 RX ORDER — ATORVASTATIN CALCIUM 80 MG/1
10 TABLET, FILM COATED ORAL AT BEDTIME
Refills: 0 | Status: DISCONTINUED | OUTPATIENT
Start: 2023-11-15 | End: 2023-11-20

## 2023-11-15 RX ORDER — ONDANSETRON 8 MG/1
4 TABLET, FILM COATED ORAL ONCE
Refills: 0 | Status: DISCONTINUED | OUTPATIENT
Start: 2023-11-15 | End: 2023-11-16

## 2023-11-15 RX ORDER — GLUCAGON INJECTION, SOLUTION 0.5 MG/.1ML
1 INJECTION, SOLUTION SUBCUTANEOUS ONCE
Refills: 0 | Status: DISCONTINUED | OUTPATIENT
Start: 2023-11-15 | End: 2023-11-20

## 2023-11-15 RX ORDER — DEXTROSE 50 % IN WATER 50 %
15 SYRINGE (ML) INTRAVENOUS ONCE
Refills: 0 | Status: DISCONTINUED | OUTPATIENT
Start: 2023-11-15 | End: 2023-11-20

## 2023-11-15 RX ORDER — DEXTROSE 50 % IN WATER 50 %
12.5 SYRINGE (ML) INTRAVENOUS ONCE
Refills: 0 | Status: DISCONTINUED | OUTPATIENT
Start: 2023-11-15 | End: 2023-11-20

## 2023-11-15 RX ORDER — TAMSULOSIN HYDROCHLORIDE 0.4 MG/1
0.4 CAPSULE ORAL AT BEDTIME
Refills: 0 | Status: DISCONTINUED | OUTPATIENT
Start: 2023-11-15 | End: 2023-11-20

## 2023-11-15 RX ORDER — MEMANTINE HYDROCHLORIDE 10 MG/1
5 TABLET ORAL
Refills: 0 | Status: DISCONTINUED | OUTPATIENT
Start: 2023-11-15 | End: 2023-11-20

## 2023-11-15 RX ORDER — ALBUTEROL 90 UG/1
2.5 AEROSOL, METERED ORAL ONCE
Refills: 0 | Status: DISCONTINUED | OUTPATIENT
Start: 2023-11-15 | End: 2023-11-16

## 2023-11-15 RX ORDER — SODIUM CHLORIDE 9 MG/ML
1000 INJECTION, SOLUTION INTRAVENOUS
Refills: 0 | Status: DISCONTINUED | OUTPATIENT
Start: 2023-11-15 | End: 2023-11-18

## 2023-11-15 RX ORDER — MIRTAZAPINE 45 MG/1
15 TABLET, ORALLY DISINTEGRATING ORAL DAILY
Refills: 0 | Status: DISCONTINUED | OUTPATIENT
Start: 2023-11-15 | End: 2023-11-16

## 2023-11-15 RX ORDER — INSULIN LISPRO 100/ML
VIAL (ML) SUBCUTANEOUS
Refills: 0 | Status: DISCONTINUED | OUTPATIENT
Start: 2023-11-15 | End: 2023-11-20

## 2023-11-15 RX ORDER — PIPERACILLIN AND TAZOBACTAM 4; .5 G/20ML; G/20ML
3.38 INJECTION, POWDER, LYOPHILIZED, FOR SOLUTION INTRAVENOUS EVERY 8 HOURS
Refills: 0 | Status: DISCONTINUED | OUTPATIENT
Start: 2023-11-15 | End: 2023-11-17

## 2023-11-15 RX ORDER — INSULIN LISPRO 100/ML
VIAL (ML) SUBCUTANEOUS AT BEDTIME
Refills: 0 | Status: DISCONTINUED | OUTPATIENT
Start: 2023-11-15 | End: 2023-11-20

## 2023-11-15 RX ORDER — TIOTROPIUM BROMIDE 18 UG/1
2 CAPSULE ORAL; RESPIRATORY (INHALATION) DAILY
Refills: 0 | Status: DISCONTINUED | OUTPATIENT
Start: 2023-11-15 | End: 2023-11-20

## 2023-11-15 RX ORDER — HEPARIN SODIUM 5000 [USP'U]/ML
5000 INJECTION INTRAVENOUS; SUBCUTANEOUS EVERY 8 HOURS
Refills: 0 | Status: DISCONTINUED | OUTPATIENT
Start: 2023-11-15 | End: 2023-11-20

## 2023-11-15 RX ORDER — ALBUTEROL 90 UG/1
2 AEROSOL, METERED ORAL EVERY 6 HOURS
Refills: 0 | Status: DISCONTINUED | OUTPATIENT
Start: 2023-11-15 | End: 2023-11-20

## 2023-11-15 RX ORDER — ACETAMINOPHEN 500 MG
650 TABLET ORAL EVERY 6 HOURS
Refills: 0 | Status: DISCONTINUED | OUTPATIENT
Start: 2023-11-15 | End: 2023-11-20

## 2023-11-15 RX ADMIN — TAMSULOSIN HYDROCHLORIDE 0.4 MILLIGRAM(S): 0.4 CAPSULE ORAL at 21:27

## 2023-11-15 RX ADMIN — MIRTAZAPINE 7.5 MILLIGRAM(S): 45 TABLET, ORALLY DISINTEGRATING ORAL at 21:27

## 2023-11-15 RX ADMIN — BUDESONIDE AND FORMOTEROL FUMARATE DIHYDRATE 2 PUFF(S): 160; 4.5 AEROSOL RESPIRATORY (INHALATION) at 21:27

## 2023-11-15 RX ADMIN — MEMANTINE HYDROCHLORIDE 5 MILLIGRAM(S): 10 TABLET ORAL at 21:27

## 2023-11-15 RX ADMIN — ATORVASTATIN CALCIUM 10 MILLIGRAM(S): 80 TABLET, FILM COATED ORAL at 22:26

## 2023-11-15 RX ADMIN — HEPARIN SODIUM 5000 UNIT(S): 5000 INJECTION INTRAVENOUS; SUBCUTANEOUS at 22:02

## 2023-11-15 NOTE — ASU PREOP CHECKLIST - SELECT TESTS ORDERED
CBC/CMP CBC/CMP/Type and Cross/Type and Screen 149@14:50pm/CBC/CMP/Type and Cross/Type and Screen/POCT Blood Glucose

## 2023-11-15 NOTE — PROGRESS NOTE ADULT - PROBLEM SELECTOR PLAN 1
Strict I&O's  Analgesia Antiemetics  DVT prophylaxis  Incentive spirometry  Clears to reg as mony  / OOB  AM labs

## 2023-11-15 NOTE — PROGRESS NOTE ADULT - SUBJECTIVE AND OBJECTIVE BOX
Note    Post op Check    s/p : TURBT    Pt seen / examined without complaints pain controlled    Vital Signs Last 24 Hrs  T(C): 36.2 (15 Nov 2023 20:45), Max: 36.8 (15 Nov 2023 19:25)  T(F): 97.1 (15 Nov 2023 20:45), Max: 98.2 (15 Nov 2023 19:25)  HR: 81 (15 Nov 2023 21:30) (81 - 116)  BP: 122/61 (15 Nov 2023 21:30) (104/47 - 148/79)  BP(mean): 76 (15 Nov 2023 21:30) (57 - 110)  RR: 17 (15 Nov 2023 21:30) (17 - 28)  SpO2: 95% (15 Nov 2023 21:30) (92% - 100%)    Parameters below as of 15 Nov 2023 21:30  Patient On (Oxygen Delivery Method): room air        I&O's Summary    ebl=10cc  fol=150    PHYSICAL EXAM:       Constitutional: awake alert NAD    Respiratory: no resp distress    Cardiovascular: RRR    Gastrointestinal: soft NT ND    Genitourinary: morrison in place, light red color    Extremities: + venodynes                                        9.5    13.19 )-----------( 228      ( 15 Nov 2023 19:50 )             31.8       11-15    138  |  103  |  26<H>  ----------------------------<  185<H>  5.2   |  20<L>  |  1.15    Ca    9.5      15 Nov 2023 19:50

## 2023-11-15 NOTE — PATIENT PROFILE ADULT - FALL HARM RISK - HARM RISK INTERVENTIONS

## 2023-11-15 NOTE — PATIENT PROFILE ADULT - FUNCTIONAL ASSESSMENT - DAILY ACTIVITY 3.
[Healthy eating counseling provided] : healthy eating [Activity counseling provided] : activity [None] : None 4 = No assist / stand by assistance

## 2023-11-16 DIAGNOSIS — I10 ESSENTIAL (PRIMARY) HYPERTENSION: ICD-10-CM

## 2023-11-16 DIAGNOSIS — Z86.59 PERSONAL HISTORY OF OTHER MENTAL AND BEHAVIORAL DISORDERS: ICD-10-CM

## 2023-11-16 DIAGNOSIS — E11.9 TYPE 2 DIABETES MELLITUS WITHOUT COMPLICATIONS: ICD-10-CM

## 2023-11-16 DIAGNOSIS — J44.9 CHRONIC OBSTRUCTIVE PULMONARY DISEASE, UNSPECIFIED: ICD-10-CM

## 2023-11-16 DIAGNOSIS — F03.90 UNSPECIFIED DEMENTIA WITHOUT BEHAVIORAL DISTURBANCE: ICD-10-CM

## 2023-11-16 LAB
GLUCOSE BLDC GLUCOMTR-MCNC: 115 MG/DL — HIGH (ref 70–99)
GLUCOSE BLDC GLUCOMTR-MCNC: 115 MG/DL — HIGH (ref 70–99)
GLUCOSE BLDC GLUCOMTR-MCNC: 145 MG/DL — HIGH (ref 70–99)
GLUCOSE BLDC GLUCOMTR-MCNC: 145 MG/DL — HIGH (ref 70–99)
GLUCOSE BLDC GLUCOMTR-MCNC: 149 MG/DL — HIGH (ref 70–99)
GLUCOSE BLDC GLUCOMTR-MCNC: 149 MG/DL — HIGH (ref 70–99)
GLUCOSE BLDC GLUCOMTR-MCNC: 157 MG/DL — HIGH (ref 70–99)
GLUCOSE BLDC GLUCOMTR-MCNC: 157 MG/DL — HIGH (ref 70–99)
GLUCOSE BLDC GLUCOMTR-MCNC: 200 MG/DL — HIGH (ref 70–99)
GLUCOSE BLDC GLUCOMTR-MCNC: 200 MG/DL — HIGH (ref 70–99)
HCT VFR BLD CALC: 29.4 % — LOW (ref 39–50)
HCT VFR BLD CALC: 29.4 % — LOW (ref 39–50)
HGB BLD-MCNC: 9.1 G/DL — LOW (ref 13–17)
HGB BLD-MCNC: 9.1 G/DL — LOW (ref 13–17)
MCHC RBC-ENTMCNC: 24.8 PG — LOW (ref 27–34)
MCHC RBC-ENTMCNC: 24.8 PG — LOW (ref 27–34)
MCHC RBC-ENTMCNC: 31 GM/DL — LOW (ref 32–36)
MCHC RBC-ENTMCNC: 31 GM/DL — LOW (ref 32–36)
MCV RBC AUTO: 80.1 FL — SIGNIFICANT CHANGE UP (ref 80–100)
MCV RBC AUTO: 80.1 FL — SIGNIFICANT CHANGE UP (ref 80–100)
NRBC # BLD: 0 /100 WBCS — SIGNIFICANT CHANGE UP (ref 0–0)
NRBC # BLD: 0 /100 WBCS — SIGNIFICANT CHANGE UP (ref 0–0)
NRBC # FLD: 0 K/UL — SIGNIFICANT CHANGE UP (ref 0–0)
NRBC # FLD: 0 K/UL — SIGNIFICANT CHANGE UP (ref 0–0)
PLATELET # BLD AUTO: 382 K/UL — SIGNIFICANT CHANGE UP (ref 150–400)
PLATELET # BLD AUTO: 382 K/UL — SIGNIFICANT CHANGE UP (ref 150–400)
RBC # BLD: 3.67 M/UL — LOW (ref 4.2–5.8)
RBC # BLD: 3.67 M/UL — LOW (ref 4.2–5.8)
RBC # FLD: 15.9 % — HIGH (ref 10.3–14.5)
RBC # FLD: 15.9 % — HIGH (ref 10.3–14.5)
WBC # BLD: 11.54 K/UL — HIGH (ref 3.8–10.5)
WBC # BLD: 11.54 K/UL — HIGH (ref 3.8–10.5)
WBC # FLD AUTO: 11.54 K/UL — HIGH (ref 3.8–10.5)
WBC # FLD AUTO: 11.54 K/UL — HIGH (ref 3.8–10.5)

## 2023-11-16 PROCEDURE — 99223 1ST HOSP IP/OBS HIGH 75: CPT

## 2023-11-16 RX ORDER — LIDOCAINE 4 G/100G
1 CREAM TOPICAL EVERY 4 HOURS
Refills: 0 | Status: DISCONTINUED | OUTPATIENT
Start: 2023-11-16 | End: 2023-11-20

## 2023-11-16 RX ORDER — MIRTAZAPINE 45 MG/1
22.5 TABLET, ORALLY DISINTEGRATING ORAL AT BEDTIME
Refills: 0 | Status: DISCONTINUED | OUTPATIENT
Start: 2023-11-16 | End: 2023-11-20

## 2023-11-16 RX ORDER — LIDOCAINE 4 G/100G
1 CREAM TOPICAL
Refills: 0 | Status: DISCONTINUED | OUTPATIENT
Start: 2023-11-16 | End: 2023-11-16

## 2023-11-16 RX ADMIN — TAMSULOSIN HYDROCHLORIDE 0.4 MILLIGRAM(S): 0.4 CAPSULE ORAL at 21:57

## 2023-11-16 RX ADMIN — BUDESONIDE AND FORMOTEROL FUMARATE DIHYDRATE 2 PUFF(S): 160; 4.5 AEROSOL RESPIRATORY (INHALATION) at 21:59

## 2023-11-16 RX ADMIN — PIPERACILLIN AND TAZOBACTAM 25 GRAM(S): 4; .5 INJECTION, POWDER, LYOPHILIZED, FOR SOLUTION INTRAVENOUS at 17:36

## 2023-11-16 RX ADMIN — HEPARIN SODIUM 5000 UNIT(S): 5000 INJECTION INTRAVENOUS; SUBCUTANEOUS at 06:32

## 2023-11-16 RX ADMIN — HEPARIN SODIUM 5000 UNIT(S): 5000 INJECTION INTRAVENOUS; SUBCUTANEOUS at 21:59

## 2023-11-16 RX ADMIN — ATORVASTATIN CALCIUM 10 MILLIGRAM(S): 80 TABLET, FILM COATED ORAL at 21:59

## 2023-11-16 RX ADMIN — MEMANTINE HYDROCHLORIDE 5 MILLIGRAM(S): 10 TABLET ORAL at 06:43

## 2023-11-16 RX ADMIN — MIRTAZAPINE 15 MILLIGRAM(S): 45 TABLET, ORALLY DISINTEGRATING ORAL at 14:38

## 2023-11-16 RX ADMIN — Medication 40 MILLIGRAM(S): at 14:39

## 2023-11-16 RX ADMIN — SODIUM CHLORIDE 79 MILLILITER(S): 9 INJECTION, SOLUTION INTRAVENOUS at 14:41

## 2023-11-16 RX ADMIN — MONTELUKAST 10 MILLIGRAM(S): 4 TABLET, CHEWABLE ORAL at 14:40

## 2023-11-16 RX ADMIN — BUDESONIDE AND FORMOTEROL FUMARATE DIHYDRATE 2 PUFF(S): 160; 4.5 AEROSOL RESPIRATORY (INHALATION) at 09:43

## 2023-11-16 RX ADMIN — PIPERACILLIN AND TAZOBACTAM 25 GRAM(S): 4; .5 INJECTION, POWDER, LYOPHILIZED, FOR SOLUTION INTRAVENOUS at 01:50

## 2023-11-16 RX ADMIN — MIRTAZAPINE 22.5 MILLIGRAM(S): 45 TABLET, ORALLY DISINTEGRATING ORAL at 21:58

## 2023-11-16 RX ADMIN — MEMANTINE HYDROCHLORIDE 5 MILLIGRAM(S): 10 TABLET ORAL at 17:04

## 2023-11-16 RX ADMIN — PIPERACILLIN AND TAZOBACTAM 25 GRAM(S): 4; .5 INJECTION, POWDER, LYOPHILIZED, FOR SOLUTION INTRAVENOUS at 09:51

## 2023-11-16 RX ADMIN — POLYETHYLENE GLYCOL 3350 17 GRAM(S): 17 POWDER, FOR SOLUTION ORAL at 14:40

## 2023-11-16 RX ADMIN — Medication 20 MILLIGRAM(S): at 14:39

## 2023-11-16 RX ADMIN — LIDOCAINE 1 APPLICATION(S): 4 CREAM TOPICAL at 07:16

## 2023-11-16 RX ADMIN — TIOTROPIUM BROMIDE 2 PUFF(S): 18 CAPSULE ORAL; RESPIRATORY (INHALATION) at 12:30

## 2023-11-16 RX ADMIN — Medication 1: at 17:03

## 2023-11-16 RX ADMIN — HEPARIN SODIUM 5000 UNIT(S): 5000 INJECTION INTRAVENOUS; SUBCUTANEOUS at 14:40

## 2023-11-16 NOTE — PROGRESS NOTE ADULT - ASSESSMENT
89 yo M s/p TURBT 11/15/2023    11/16: no complaints, urine punch colored, irrigated no clots, Hgb stable    Plan:  -AM CBC reviewed  -continue zosyn  -f/u OR Cx  -continue morrison, monitor color, irrigate prn  -bowel regimen  -f/u medicine  -DVT prophy, IS, OOB, ambulate

## 2023-11-16 NOTE — CONSULT NOTE ADULT - PROBLEM SELECTOR RECOMMENDATION 9
-s/p TURBT on 11/15  -postop management per , IVF, pain control, punch colored urine, not on CBI, irrigate without clot, ctm urine color  -trend CBC, H/H relatively stable 9.1/29.4, no indication for transfusion  -on empiric Zosyn, f/u UCx

## 2023-11-16 NOTE — CONSULT NOTE ADULT - SUBJECTIVE AND OBJECTIVE BOX
Alanis Dudley MD  Pager [phone]    HPI:  89 y/o male NH resident with h/o HTN, DM2, HLD, extensive emphysema/COPD, Dementia, Depression, Hep C, Duodenal ulcer diease, Lung nodules/lung cancer s/p right lung surgery in  w/o chemo/RT, subdural hygromas, who has had hematuria for few months, found to have large bladder tumor, s/p TURBT on 11/15/23. Postop with punch colored urine, irrigated no clots. Patient seen on POD#1, no specific complaints, hemodynamically stable, denies chest pain/sob/dizziness.      PAST MEDICAL & SURGICAL HISTORY:  Lung nodule, multiple      COPD (chronic obstructive pulmonary disease)      Depression      Emphysema      Duodenal ulcer disease      Hepatitis C  patients daughter states that she was told that the patient has hepitatis c      HLD (hyperlipidemia)      Type 2 diabetes mellitus      Duodenal ulcer      Elevated LFTs      History of hepatitis C      HTN (hypertension)      Lung cancer      Vitamin D deficiency      Malignant neoplasm of overlapping sites of bladder      Dementia      Calcification of aorta      H/O: depression      History of lung surgery      S/P hemorrhoidectomy          Review of Systems:   CONSTITUTIONAL: No fever, weight loss, or fatigue  EYES: No eye pain, visual disturbances, or discharge  ENMT:  No difficulty hearing, tinnitus, vertigo; No sinus or throat pain  NECK: No pain or stiffness  BREASTS: No pain, masses, or nipple discharge  RESPIRATORY: No cough, wheezing, chills or hemoptysis; +CULP  CARDIOVASCULAR: No chest pain, palpitations, dizziness, or leg swelling  GASTROINTESTINAL: No abdominal or epigastric pain. No nausea, vomiting, or hematemesis; No diarrhea or constipation. No melena or hematochezia.  GENITOURINARY: No dysuria, frequency, +hematuria, no incontinence  NEUROLOGICAL: No headaches, +memory loss, no loss of strength, numbness, or tremors  SKIN: No itching, burning, rashes, or lesions   LYMPH NODES: No enlarged glands  ENDOCRINE: No heat or cold intolerance; No hair loss  MUSCULOSKELETAL: No joint pain or swelling; No muscle, back, or extremity pain  PSYCHIATRIC: No depression, anxiety, mood swings, or difficulty sleeping  HEME/LYMPH: No easy bruising, or bleeding gums  ALLERY AND IMMUNOLOGIC: No hives or eczema    Allergies    No Known Allergies    Intolerances        Social History: NH resident, no alcohol use, former smoker, quit in  ppd x45 yrs    FAMILY HISTORY:  Family history of cerebral infarction (Father)  father  age 71        Home Medications:  Albuterol (Eqv-ProAir HFA) 90 mcg/inh inhalation aerosol: 2 puff(s) inhaled every 6 hours as needed (2023 09:42)  albuterol 2.5 mg/3 mL (0.083%) inhalation solution: 3 milliliter(s) by nebulizer as needed for SOB lung sounds pre and post nebulizer treatment (:42)  atorvastatin 10 mg oral tablet: 1 tab(s) orally once a day (:42)  budesonide-formoterol 80 mcg-4.5 mcg/inh inhalation aerosol: 2 puff(s) inhaled 2 times a day (:51)  Colace 100 mg oral capsule: 2 cap(s) orally once a day (at bedtime) (:51)  Dulcolax Laxative 10 mg rectal suppository: 1 suppository(ies) rectally once a day as needed (:51)  Fleet Enema: insert 1 unit rectally as needed for constipation x 1 dose if Dulcolax suppository ineffective (:51)  Flomax 0.4 mg oral capsule: 1 cap(s) orally once a day (:43)  GlycoLax oral powder for reconstitution: 17 each orally once a day (:51)  metFORMIN 500 mg oral tablet: 1 tab(s) orally 2 times a day (:51)  milk of magnesia suspension 400mg/5ml: 30 ml orally once a day as needed (:51)  Namenda 5 mg oral tablet: 1 tab(s) orally 2 times a day (:51)  PARoxetine 20 mg oral tablet: 1 tab(s) orally once a day (:51)  PARoxetine 40 mg oral tablet: 1 tab(s) orally once a day (:50)  Refresh ophthalmic solution: 1 drop(s) in each eye 2 times a day (:50)  Remeron 15 mg oral tablet: 0.5 tab(s) orally once a day (at bedtime) (2023 09:50)  Remeron 15 mg oral tablet: 1 tab(s) orally once a day (2023 09:50)  Singulair 10 mg oral tablet: 1 tab(s) orally once a day (2023 09:50)  tiotropium 18 mcg inhalation capsule: 1 cap(s) inhaled once a day (2023 09:51)      MEDICATIONS  (STANDING):  atorvastatin 10 milliGRAM(s) Oral at bedtime  budesonide  80 MICROgram(s)/formoterol 4.5 MICROgram(s) Inhaler 2 Puff(s) Inhalation two times a day  dextrose 5%. 1000 milliLiter(s) (50 mL/Hr) IV Continuous <Continuous>  dextrose 5%. 1000 milliLiter(s) (100 mL/Hr) IV Continuous <Continuous>  dextrose 50% Injectable 25 Gram(s) IV Push once  dextrose 50% Injectable 12.5 Gram(s) IV Push once  dextrose 50% Injectable 25 Gram(s) IV Push once  glucagon  Injectable 1 milliGRAM(s) IntraMuscular once  heparin   Injectable 5000 Unit(s) SubCutaneous every 8 hours  insulin lispro (ADMELOG) corrective regimen sliding scale   SubCutaneous three times a day before meals  insulin lispro (ADMELOG) corrective regimen sliding scale   SubCutaneous at bedtime  lactated ringers. 1000 milliLiter(s) (79 mL/Hr) IV Continuous <Continuous>  memantine 5 milliGRAM(s) Oral two times a day  mirtazapine 7.5 milliGRAM(s) Oral at bedtime  mirtazapine 15 milliGRAM(s) Oral daily  montelukast 10 milliGRAM(s) Oral daily  PARoxetine 20 milliGRAM(s) Oral daily  PARoxetine 40 milliGRAM(s) Oral daily  piperacillin/tazobactam IVPB.. 3.375 Gram(s) IV Intermittent every 8 hours  polyethylene glycol 3350 17 Gram(s) Oral daily  tamsulosin 0.4 milliGRAM(s) Oral at bedtime  tiotropium 2.5 MICROgram(s) Inhaler 2 Puff(s) Inhalation daily    MEDICATIONS  (PRN):  acetaminophen     Tablet .. 650 milliGRAM(s) Oral every 6 hours PRN Mild Pain (1 - 3), Moderate Pain (4 - 6)  albuterol    0.083%. 2.5 milliGRAM(s) Nebulizer once PRN Shortness of Breath and/or Wheezing  albuterol    90 MICROgram(s) HFA Inhaler 2 Puff(s) Inhalation every 6 hours PRN Shortness of Breath and/or Wheezing  bisacodyl Suppository 10 milliGRAM(s) Rectal daily PRN Constipation  dextrose Oral Gel 15 Gram(s) Oral once PRN Blood Glucose LESS THAN 70 milliGRAM(s)/deciliter  lidocaine 4% Cream 1 Application(s) Topical every 4 hours PRN morrison/meatus pain  ondansetron Injectable 4 milliGRAM(s) IV Push once PRN Nausea and/or Vomiting      Vital Signs Last 24 Hrs  T(C): 36.3 (2023 10:00), Max: 36.9 (2023 02:00)  T(F): 97.4 (2023 10:00), Max: 98.4 (2023 02:00)  HR: 85 (2023 10:00) (80 - 116)  BP: 117/48 (2023 10:00) (94/53 - 148/79)  BP(mean): 65 (2023 10:00) (57 - 110)  RR: 14 (2023 10:00) (12 - 28)  SpO2: 94% (2023 10:00) (92% - 100%)    Parameters below as of 2023 10:00  Patient On (Oxygen Delivery Method): room air      CAPILLARY BLOOD GLUCOSE      POCT Blood Glucose.: 145 mg/dL (2023 10:57)  POCT Blood Glucose.: 115 mg/dL (2023 07:18)  POCT Blood Glucose.: 147 mg/dL (15 Nov 2023 22:28)  POCT Blood Glucose.: 181 mg/dL (15 Nov 2023 19:47)  POCT Blood Glucose.: 149 mg/dL (15 Nov 2023 14:47)    I&O's Summary    15 Nov 2023 07:01  -  2023 07:00  --------------------------------------------------------  IN: 1148 mL / OUT: 400 mL / NET: 748 mL    2023 07:01  -  2023 12:38  --------------------------------------------------------  IN: 316 mL / OUT: 100 mL / NET: 216 mL        PHYSICAL EXAM:  GENERAL: NAD, frail thin elderly man  HEAD:  Atraumatic, Normocephalic  EYES: EOMI,, conjunctiva and sclera clear  NECK: Supple, No JVD  CHEST/LUNG: Clear , no wheeze, decreased air entry b/l  HEART: Regular rate and rhythm; No murmurs, rubs, or gallops  ABDOMEN: Soft, Nontender, Nondistended; Bowel sounds present  EXTREMITIES:  2+ Peripheral Pulses, No clubbing, cyanosis, or edema  PSYCH: AAOx2 (person, place)  NEUROLOGY: non-focal  SKIN: No rashes or lesions    LABS:                        9.1    11.54 )-----------( 382      ( 2023 05:45 )             29.4     11-15    138  |  103  |  26<H>  ----------------------------<  185<H>  5.2   |  20<L>  |  1.15    Ca    9.5      15 Nov 2023 19:50        Urinalysis Basic - ( 15 Nov 2023 19:50 )    Color: x / Appearance: x / SG: x / pH: x  Gluc: 185 mg/dL / Ketone: x  / Bili: x / Urobili: x   Blood: x / Protein: x / Nitrite: x   Leuk Esterase: x / RBC: x / WBC x   Sq Epi: x / Non Sq Epi: x / Bacteria: x      Microbiology     RADIOLOGY & ADDITIONAL TESTS:    Imaging Personally Reviewed:      Consultant(s) Notes Reviewed:      Care Discussed with Consultants/Other Providers:   Alanis Dudley MD  Pager [phone]    HPI:  89 y/o male NH resident with h/o HTN, DM2, HLD, extensive emphysema/COPD, Dementia, Depression, Hep C, Duodenal ulcer diease, Lung nodules/lung cancer s/p right lung surgery in  w/o chemo/RT, subdural hygromas, who has had hematuria for few months, found to have large bladder tumor, s/p TURBT on 11/15/23. Postop with punch colored urine, irrigated no clots. Patient seen on POD#1, no specific complaints, hemodynamically stable, denies chest pain/sob/dizziness.      PAST MEDICAL & SURGICAL HISTORY:  Lung nodule, multiple      COPD (chronic obstructive pulmonary disease)      Depression      Emphysema      Duodenal ulcer disease      Hepatitis C  patients daughter states that she was told that the patient has hepitatis c      HLD (hyperlipidemia)      Type 2 diabetes mellitus      Duodenal ulcer      Elevated LFTs      History of hepatitis C      HTN (hypertension)      Lung cancer      Vitamin D deficiency      Malignant neoplasm of overlapping sites of bladder      Dementia      Calcification of aorta      H/O: depression      History of lung surgery      S/P hemorrhoidectomy          Review of Systems:   CONSTITUTIONAL: No fever, weight loss, or fatigue  EYES: No eye pain, visual disturbances, or discharge  ENMT:  No difficulty hearing, tinnitus, vertigo; No sinus or throat pain  NECK: No pain or stiffness  BREASTS: No pain, masses, or nipple discharge  RESPIRATORY: No cough, wheezing, chills or hemoptysis; +CULP  CARDIOVASCULAR: No chest pain, palpitations, dizziness, or leg swelling  GASTROINTESTINAL: No abdominal or epigastric pain. No nausea, vomiting, or hematemesis; No diarrhea or constipation. No melena or hematochezia.  GENITOURINARY: No dysuria, frequency, +hematuria, no incontinence  NEUROLOGICAL: No headaches, +memory loss, no loss of strength, numbness, or tremors  SKIN: No itching, burning, rashes, or lesions   LYMPH NODES: No enlarged glands  ENDOCRINE: No heat or cold intolerance; No hair loss  MUSCULOSKELETAL: No joint pain or swelling; No muscle, back, or extremity pain  PSYCHIATRIC: No depression, anxiety, mood swings, or difficulty sleeping  HEME/LYMPH: No easy bruising, or bleeding gums  ALLERY AND IMMUNOLOGIC: No hives or eczema    Allergies    No Known Allergies    Intolerances        Social History: NH resident, no alcohol use, former smoker, quit in  ppd x45 yrs    FAMILY HISTORY:  Family history of cerebral infarction (Father)  father  age 71        Home Medications:  Albuterol (Eqv-ProAir HFA) 90 mcg/inh inhalation aerosol: 2 puff(s) inhaled every 6 hours as needed (2023 09:42)  albuterol 2.5 mg/3 mL (0.083%) inhalation solution: 3 milliliter(s) by nebulizer as needed for SOB lung sounds pre and post nebulizer treatment (:42)  atorvastatin 10 mg oral tablet: 1 tab(s) orally once a day (:42)  budesonide-formoterol 80 mcg-4.5 mcg/inh inhalation aerosol: 2 puff(s) inhaled 2 times a day (:51)  Colace 100 mg oral capsule: 2 cap(s) orally once a day (at bedtime) (:51)  Dulcolax Laxative 10 mg rectal suppository: 1 suppository(ies) rectally once a day as needed (:51)  Fleet Enema: insert 1 unit rectally as needed for constipation x 1 dose if Dulcolax suppository ineffective (:51)  Flomax 0.4 mg oral capsule: 1 cap(s) orally once a day (:43)  GlycoLax oral powder for reconstitution: 17 each orally once a day (:51)  metFORMIN 500 mg oral tablet: 1 tab(s) orally 2 times a day (:51)  milk of magnesia suspension 400mg/5ml: 30 ml orally once a day as needed (:51)  Namenda 5 mg oral tablet: 1 tab(s) orally 2 times a day (:51)  PARoxetine 20 mg oral tablet: 1 tab(s) orally once a day (:51)  PARoxetine 40 mg oral tablet: 1 tab(s) orally once a day (:50)  Refresh ophthalmic solution: 1 drop(s) in each eye 2 times a day (:50)  Remeron 15 mg oral tablet: 0.5 tab(s) orally once a day (at bedtime) (2023 09:50)  Remeron 15 mg oral tablet: 1 tab(s) orally once a day (2023 09:50)  Singulair 10 mg oral tablet: 1 tab(s) orally once a day (2023 09:50)  tiotropium 18 mcg inhalation capsule: 1 cap(s) inhaled once a day (2023 09:51)      MEDICATIONS  (STANDING):  atorvastatin 10 milliGRAM(s) Oral at bedtime  budesonide  80 MICROgram(s)/formoterol 4.5 MICROgram(s) Inhaler 2 Puff(s) Inhalation two times a day  dextrose 5%. 1000 milliLiter(s) (50 mL/Hr) IV Continuous <Continuous>  dextrose 5%. 1000 milliLiter(s) (100 mL/Hr) IV Continuous <Continuous>  dextrose 50% Injectable 25 Gram(s) IV Push once  dextrose 50% Injectable 12.5 Gram(s) IV Push once  dextrose 50% Injectable 25 Gram(s) IV Push once  glucagon  Injectable 1 milliGRAM(s) IntraMuscular once  heparin   Injectable 5000 Unit(s) SubCutaneous every 8 hours  insulin lispro (ADMELOG) corrective regimen sliding scale   SubCutaneous three times a day before meals  insulin lispro (ADMELOG) corrective regimen sliding scale   SubCutaneous at bedtime  lactated ringers. 1000 milliLiter(s) (79 mL/Hr) IV Continuous <Continuous>  memantine 5 milliGRAM(s) Oral two times a day  mirtazapine 7.5 milliGRAM(s) Oral at bedtime  mirtazapine 15 milliGRAM(s) Oral daily  montelukast 10 milliGRAM(s) Oral daily  PARoxetine 20 milliGRAM(s) Oral daily  PARoxetine 40 milliGRAM(s) Oral daily  piperacillin/tazobactam IVPB.. 3.375 Gram(s) IV Intermittent every 8 hours  polyethylene glycol 3350 17 Gram(s) Oral daily  tamsulosin 0.4 milliGRAM(s) Oral at bedtime  tiotropium 2.5 MICROgram(s) Inhaler 2 Puff(s) Inhalation daily    MEDICATIONS  (PRN):  acetaminophen     Tablet .. 650 milliGRAM(s) Oral every 6 hours PRN Mild Pain (1 - 3), Moderate Pain (4 - 6)  albuterol    0.083%. 2.5 milliGRAM(s) Nebulizer once PRN Shortness of Breath and/or Wheezing  albuterol    90 MICROgram(s) HFA Inhaler 2 Puff(s) Inhalation every 6 hours PRN Shortness of Breath and/or Wheezing  bisacodyl Suppository 10 milliGRAM(s) Rectal daily PRN Constipation  dextrose Oral Gel 15 Gram(s) Oral once PRN Blood Glucose LESS THAN 70 milliGRAM(s)/deciliter  lidocaine 4% Cream 1 Application(s) Topical every 4 hours PRN morrison/meatus pain  ondansetron Injectable 4 milliGRAM(s) IV Push once PRN Nausea and/or Vomiting      Vital Signs Last 24 Hrs  T(C): 36.3 (2023 10:00), Max: 36.9 (2023 02:00)  T(F): 97.4 (2023 10:00), Max: 98.4 (2023 02:00)  HR: 85 (2023 10:00) (80 - 116)  BP: 117/48 (2023 10:00) (94/53 - 148/79)  BP(mean): 65 (2023 10:00) (57 - 110)  RR: 14 (2023 10:00) (12 - 28)  SpO2: 94% (2023 10:00) (92% - 100%)    Parameters below as of 2023 10:00  Patient On (Oxygen Delivery Method): room air      CAPILLARY BLOOD GLUCOSE      POCT Blood Glucose.: 145 mg/dL (2023 10:57)  POCT Blood Glucose.: 115 mg/dL (2023 07:18)  POCT Blood Glucose.: 147 mg/dL (15 Nov 2023 22:28)  POCT Blood Glucose.: 181 mg/dL (15 Nov 2023 19:47)  POCT Blood Glucose.: 149 mg/dL (15 Nov 2023 14:47)    I&O's Summary    15 Nov 2023 07:01  -  2023 07:00  --------------------------------------------------------  IN: 1148 mL / OUT: 400 mL / NET: 748 mL    2023 07:01  -  2023 12:38  --------------------------------------------------------  IN: 316 mL / OUT: 100 mL / NET: 216 mL        PHYSICAL EXAM:  GENERAL: NAD, frail thin elderly man  HEAD:  Atraumatic, Normocephalic  EYES: EOMI,, conjunctiva and sclera clear  NECK: Supple, No JVD  CHEST/LUNG: Clear , no wheeze, decreased air entry b/l  HEART: Regular rate and rhythm; No murmurs, rubs, or gallops  ABDOMEN: Soft, Nontender, Nondistended; Bowel sounds present  : morrison in place draining punch colored urine  EXTREMITIES:  2+ Peripheral Pulses, No clubbing, cyanosis, or edema  PSYCH: AAOx2 (person, place)  NEUROLOGY: non-focal  SKIN: No rashes or lesions    LABS:                        9.1    11.54 )-----------( 382      ( 2023 05:45 )             29.4     11-15    138  |  103  |  26<H>  ----------------------------<  185<H>  5.2   |  20<L>  |  1.15    Ca    9.5      15 Nov 2023 19:50        Urinalysis Basic - ( 15 Nov 2023 19:50 )    Color: x / Appearance: x / SG: x / pH: x  Gluc: 185 mg/dL / Ketone: x  / Bili: x / Urobili: x   Blood: x / Protein: x / Nitrite: x   Leuk Esterase: x / RBC: x / WBC x   Sq Epi: x / Non Sq Epi: x / Bacteria: x      Microbiology     RADIOLOGY & ADDITIONAL TESTS:    Imaging Personally Reviewed:      Consultant(s) Notes Reviewed:      Care Discussed with Consultants/Other Providers:

## 2023-11-16 NOTE — CONSULT NOTE ADULT - ASSESSMENT
89 y/o male NH resident with h/o HTN, DM2, HLD, extensive emphysema/COPD, Dementia, Depression, Hep C, Duodenal ulcer diease, Lung nodules/lung cancer s/p right lung surgery in 2014 w/o chemo/RT, subdural hygromas, who has had hematuria for few months, found to have bladder tumor, s/p TURBT on 11/15/23

## 2023-11-16 NOTE — PROGRESS NOTE ADULT - SUBJECTIVE AND OBJECTIVE BOX
ANESTHESIA POSTOP CHECK    88y Male POSTOP DAY 1 S/P   [x General Anesthesia  [ ] Jose Anesthesia  [ ] MAC    Vital Signs Last 24 Hrs  T(C): 36.3 (16 Nov 2023 07:00), Max: 36.9 (16 Nov 2023 02:00)  T(F): 97.4 (16 Nov 2023 07:00), Max: 98.4 (16 Nov 2023 02:00)  HR: 80 (16 Nov 2023 08:00) (80 - 116)  BP: 97/51 (16 Nov 2023 08:00) (94/53 - 148/79)  BP(mean): 62 (16 Nov 2023 08:00) (57 - 110)  RR: 12 (16 Nov 2023 08:00) (12 - 28)  SpO2: 94% (16 Nov 2023 08:00) (92% - 100%)    Parameters below as of 16 Nov 2023 08:00  Patient On (Oxygen Delivery Method): room air      I&O's Summary    15 Nov 2023 07:01  -  16 Nov 2023 07:00  --------------------------------------------------------  IN: 1148 mL / OUT: 400 mL / NET: 748 mL    16 Nov 2023 07:01  -  16 Nov 2023 09:33  --------------------------------------------------------  IN: 158 mL / OUT: 60 mL / NET: 98 mL        [x ] NO APPARENT ANESTHESIA COMPLICATIONS      Comments:

## 2023-11-16 NOTE — CONSULT NOTE ADULT - PROBLEM SELECTOR RECOMMENDATION 3
former smoker, h/x COPD/emphysema, prior right lung surgery in 2014to remove lung CA, no chemo/RT  c/w home bronchodilators (proventil prn), symbicort, spiriva, singulair  supplemental O2 prn  Incentive spirometer, early mobilization

## 2023-11-16 NOTE — CONSULT NOTE ADULT - PROBLEM SELECTOR RECOMMENDATION 2
-BP controlled, not on antihypertensives  -ctm  -IVF, avoid hypotension  Preop echo EF 55-60%, mild PAH, mild MR, mod TR, mild diastolic dysfxn

## 2023-11-16 NOTE — PROGRESS NOTE ADULT - SUBJECTIVE AND OBJECTIVE BOX
Overnight events:  None    Subjective:  Pt offers no compliants    Objective:    Vital signs  T(C): , Max: 36.9 (11-16-23 @ 02:00)  HR: 84 (11-16-23 @ 06:00)  BP: 94/53 (11-16-23 @ 06:00)  SpO2: 95% (11-16-23 @ 06:00)  Wt(kg): --    Output   F oley: 315 + not saved, punch  11-15 @ 07:01  -  11-16 @ 06:55  --------------------------------------------------------  IN: 1148 mL / OUT: 400 mL / NET: 748 mL        Gen: NAD  Abd: soft, nontender  : morrison irrigated no clots to light punch    Labs                        9.1    11.54 )-----------( 382      ( 16 Nov 2023 05:45 )             29.4     15 Nov 2023 19:50    138    |  103    |  26     ----------------------------<  185    5.2     |  20     |  1.15     Ca    9.5        15 Nov 2023 19:50    OR Cx: pending

## 2023-11-17 ENCOUNTER — TRANSCRIPTION ENCOUNTER (OUTPATIENT)
Age: 88
End: 2023-11-17

## 2023-11-17 LAB
-  AMOXICILLIN/CLAVULANIC ACID: SIGNIFICANT CHANGE UP
-  AMOXICILLIN/CLAVULANIC ACID: SIGNIFICANT CHANGE UP
-  AMPICILLIN/SULBACTAM: SIGNIFICANT CHANGE UP
-  AMPICILLIN/SULBACTAM: SIGNIFICANT CHANGE UP
-  AMPICILLIN: SIGNIFICANT CHANGE UP
-  AMPICILLIN: SIGNIFICANT CHANGE UP
-  AZTREONAM: SIGNIFICANT CHANGE UP
-  AZTREONAM: SIGNIFICANT CHANGE UP
-  CEFAZOLIN: SIGNIFICANT CHANGE UP
-  CEFAZOLIN: SIGNIFICANT CHANGE UP
-  CEFEPIME: SIGNIFICANT CHANGE UP
-  CEFEPIME: SIGNIFICANT CHANGE UP
-  CEFOTAXIME: SIGNIFICANT CHANGE UP
-  CEFOTAXIME: SIGNIFICANT CHANGE UP
-  CEFOXITIN: SIGNIFICANT CHANGE UP
-  CEFOXITIN: SIGNIFICANT CHANGE UP
-  CEFTAZIDIME: SIGNIFICANT CHANGE UP
-  CEFTAZIDIME: SIGNIFICANT CHANGE UP
-  CEFTRIAXONE: SIGNIFICANT CHANGE UP
-  CEFTRIAXONE: SIGNIFICANT CHANGE UP
-  CIPROFLOXACIN: SIGNIFICANT CHANGE UP
-  CIPROFLOXACIN: SIGNIFICANT CHANGE UP
-  ERTAPENEM: SIGNIFICANT CHANGE UP
-  ERTAPENEM: SIGNIFICANT CHANGE UP
-  GENTAMICIN: SIGNIFICANT CHANGE UP
-  GENTAMICIN: SIGNIFICANT CHANGE UP
-  IMIPENEM: SIGNIFICANT CHANGE UP
-  IMIPENEM: SIGNIFICANT CHANGE UP
-  LEVOFLOXACIN: SIGNIFICANT CHANGE UP
-  LEVOFLOXACIN: SIGNIFICANT CHANGE UP
-  MEROPENEM: SIGNIFICANT CHANGE UP
-  MEROPENEM: SIGNIFICANT CHANGE UP
-  PIPERACILLIN/TAZOBACTAM: SIGNIFICANT CHANGE UP
-  PIPERACILLIN/TAZOBACTAM: SIGNIFICANT CHANGE UP
-  TOBRAMYCIN: SIGNIFICANT CHANGE UP
-  TOBRAMYCIN: SIGNIFICANT CHANGE UP
-  TRIMETHOPRIM/SULFAMETHOXAZOLE: SIGNIFICANT CHANGE UP
-  TRIMETHOPRIM/SULFAMETHOXAZOLE: SIGNIFICANT CHANGE UP
CULTURE RESULTS: ABNORMAL
CULTURE RESULTS: ABNORMAL
GLUCOSE BLDC GLUCOMTR-MCNC: 140 MG/DL — HIGH (ref 70–99)
GLUCOSE BLDC GLUCOMTR-MCNC: 140 MG/DL — HIGH (ref 70–99)
GLUCOSE BLDC GLUCOMTR-MCNC: 163 MG/DL — HIGH (ref 70–99)
GLUCOSE BLDC GLUCOMTR-MCNC: 163 MG/DL — HIGH (ref 70–99)
GLUCOSE BLDC GLUCOMTR-MCNC: 173 MG/DL — HIGH (ref 70–99)
GLUCOSE BLDC GLUCOMTR-MCNC: 173 MG/DL — HIGH (ref 70–99)
GLUCOSE BLDC GLUCOMTR-MCNC: 187 MG/DL — HIGH (ref 70–99)
GLUCOSE BLDC GLUCOMTR-MCNC: 187 MG/DL — HIGH (ref 70–99)
METHOD TYPE: SIGNIFICANT CHANGE UP
METHOD TYPE: SIGNIFICANT CHANGE UP
ORGANISM # SPEC MICROSCOPIC CNT: ABNORMAL
SPECIMEN SOURCE: SIGNIFICANT CHANGE UP
SPECIMEN SOURCE: SIGNIFICANT CHANGE UP

## 2023-11-17 PROCEDURE — 99232 SBSQ HOSP IP/OBS MODERATE 35: CPT

## 2023-11-17 PROCEDURE — 93010 ELECTROCARDIOGRAM REPORT: CPT

## 2023-11-17 RX ORDER — MIRTAZAPINE 45 MG/1
1 TABLET, ORALLY DISINTEGRATING ORAL
Qty: 0 | Refills: 0 | DISCHARGE

## 2023-11-17 RX ORDER — CIPROFLOXACIN LACTATE 400MG/40ML
1 VIAL (ML) INTRAVENOUS
Qty: 0 | Refills: 0 | DISCHARGE

## 2023-11-17 RX ORDER — CIPROFLOXACIN LACTATE 400MG/40ML
500 VIAL (ML) INTRAVENOUS DAILY
Refills: 0 | Status: DISCONTINUED | OUTPATIENT
Start: 2023-11-17 | End: 2023-11-20

## 2023-11-17 RX ORDER — ACETAMINOPHEN 500 MG
2 TABLET ORAL
Qty: 0 | Refills: 0 | DISCHARGE
Start: 2023-11-17

## 2023-11-17 RX ORDER — CIPROFLOXACIN LACTATE 400MG/40ML
250 VIAL (ML) INTRAVENOUS DAILY
Refills: 0 | Status: DISCONTINUED | OUTPATIENT
Start: 2023-11-17 | End: 2023-11-17

## 2023-11-17 RX ADMIN — TAMSULOSIN HYDROCHLORIDE 0.4 MILLIGRAM(S): 0.4 CAPSULE ORAL at 22:08

## 2023-11-17 RX ADMIN — POLYETHYLENE GLYCOL 3350 17 GRAM(S): 17 POWDER, FOR SOLUTION ORAL at 13:24

## 2023-11-17 RX ADMIN — MONTELUKAST 10 MILLIGRAM(S): 4 TABLET, CHEWABLE ORAL at 13:23

## 2023-11-17 RX ADMIN — PIPERACILLIN AND TAZOBACTAM 25 GRAM(S): 4; .5 INJECTION, POWDER, LYOPHILIZED, FOR SOLUTION INTRAVENOUS at 01:34

## 2023-11-17 RX ADMIN — HEPARIN SODIUM 5000 UNIT(S): 5000 INJECTION INTRAVENOUS; SUBCUTANEOUS at 13:23

## 2023-11-17 RX ADMIN — Medication 1: at 11:42

## 2023-11-17 RX ADMIN — MEMANTINE HYDROCHLORIDE 5 MILLIGRAM(S): 10 TABLET ORAL at 17:18

## 2023-11-17 RX ADMIN — HEPARIN SODIUM 5000 UNIT(S): 5000 INJECTION INTRAVENOUS; SUBCUTANEOUS at 06:11

## 2023-11-17 RX ADMIN — BUDESONIDE AND FORMOTEROL FUMARATE DIHYDRATE 2 PUFF(S): 160; 4.5 AEROSOL RESPIRATORY (INHALATION) at 22:08

## 2023-11-17 RX ADMIN — HEPARIN SODIUM 5000 UNIT(S): 5000 INJECTION INTRAVENOUS; SUBCUTANEOUS at 23:34

## 2023-11-17 RX ADMIN — Medication 1: at 07:44

## 2023-11-17 RX ADMIN — TIOTROPIUM BROMIDE 2 PUFF(S): 18 CAPSULE ORAL; RESPIRATORY (INHALATION) at 09:16

## 2023-11-17 RX ADMIN — MIRTAZAPINE 22.5 MILLIGRAM(S): 45 TABLET, ORALLY DISINTEGRATING ORAL at 22:09

## 2023-11-17 RX ADMIN — MEMANTINE HYDROCHLORIDE 5 MILLIGRAM(S): 10 TABLET ORAL at 06:11

## 2023-11-17 RX ADMIN — Medication 500 MILLIGRAM(S): at 17:18

## 2023-11-17 RX ADMIN — BUDESONIDE AND FORMOTEROL FUMARATE DIHYDRATE 2 PUFF(S): 160; 4.5 AEROSOL RESPIRATORY (INHALATION) at 09:16

## 2023-11-17 RX ADMIN — ATORVASTATIN CALCIUM 10 MILLIGRAM(S): 80 TABLET, FILM COATED ORAL at 22:08

## 2023-11-17 RX ADMIN — Medication 60 MILLIGRAM(S): at 13:23

## 2023-11-17 RX ADMIN — PIPERACILLIN AND TAZOBACTAM 25 GRAM(S): 4; .5 INJECTION, POWDER, LYOPHILIZED, FOR SOLUTION INTRAVENOUS at 11:54

## 2023-11-17 NOTE — DISCHARGE NOTE NURSING/CASE MANAGEMENT/SOCIAL WORK - NSDCVIVACCINE_GEN_ALL_CORE_FT
COVID-19, mRNA, LNP-S, PF, 100 mcg/ 0.5 mL dose (Moderna); 17-Jun-2022 15:07; Georgia Valdez (RN); [employer] [id number] (Exp. Date: 31-Aug-2022); IntraMuscular; Deltoid Right.; 0.25 milliLiter(s);

## 2023-11-17 NOTE — DIETITIAN INITIAL EVALUATION ADULT - OTHER INFO
RD visited with patient for MST Score 2 or Greater.    89 y/o male NH resident with h/o HTN, DM2, HLD, extensive emphysema/COPD, Dementia, Depression, Hep C, Duodenal ulcer diease, Lung nodules/lung cancer s/p right lung surgery in 2014 w/o chemo/RT, subdural hygromas, who has had hematuria for few months, found to have bladder tumor, s/p TURBT on 11/15/23 - as obtained from 11/16/23 Hospitalist Note.    Patient was eating breakfast at time of visit - exhibited good tolerance to meal without any reported / observed chewing or swallowing issues.  Food preferences obtained from daughter.  Daughter indicated patient with history of weight loss PTA - most recent weight reported at ~80 pounds.  Current admit weight 11/15/23 - 39kg / 86 pounds, slightly above reported usual body weight.  Patient without any reported GI distress i.e. nausea, vomiting, diarrhea, constipation. Reviewed current diet prescription with patient and daughter, a1c 5.7%, may benefit from diet liberalization in view of BMI 15.1 and severe muscle and fat depletion. Patient / daughter amenable to patient receiving oral supplement to optimize nutritional intake.

## 2023-11-17 NOTE — DISCHARGE NOTE NURSING/CASE MANAGEMENT/SOCIAL WORK - HAS THE PATIENT USED TOBACCO IN THE PAST 30 DAYS?
Unable to assess due to patient's cognitive impairment No no Ketoconazole Counseling:   Patient counseled regarding improving absorption with orange juice.  Adverse effects include but are not limited to breast enlargement, headache, diarrhea, nausea, upset stomach, liver function test abnormalities, taste disturbance, and stomach pain.  There is a rare possibility of liver failure that can occur when taking ketoconazole. The patient understands that monitoring of LFTs may be required, especially at baseline. The patient verbalized understanding of the proper use and possible adverse effects of ketoconazole.  All of the patient's questions and concerns were addressed.

## 2023-11-17 NOTE — DISCHARGE NOTE PROVIDER - NSDCCPCAREPLAN_GEN_ALL_CORE_FT
PRINCIPAL DISCHARGE DIAGNOSIS  Diagnosis: Bladder tumor  Assessment and Plan of Treatment: Keep well hydrated.  No heavy lifting or straining, avoid constipation. You may have intermittent pink tinged urine and urinary dribbling.  This is normal.   If your urine becomes bright red or with clots, please call the office.  Take antibiotic for 7 days  Dr. Lopez's office will call you with a follow up appointment  Call the office if you have fever greater than 101, difficulty urinating, pain not relieved with pain medication, nausea/vomiting.        SECONDARY DISCHARGE DIAGNOSES  Diagnosis: Type 2 diabetes mellitus  Assessment and Plan of Treatment: Continue current home medications and follow up with your primary care provider      Diagnosis: Dementia  Assessment and Plan of Treatment: Continue current home medications and follow up with your primary care provider      Diagnosis: HTN (hypertension)  Assessment and Plan of Treatment: Continue current home medications and follow up with your primary care provider      Diagnosis: Advanced COPD  Assessment and Plan of Treatment: Continue current home medications and follow up with your primary care provider

## 2023-11-17 NOTE — DISCHARGE NOTE NURSING/CASE MANAGEMENT/SOCIAL WORK - NSDCPNINST_GEN_ALL_CORE
Call MD with any signs of infection ie. fever/shaking chills, cloudy foul-smelling urine, or with signs of bleeding (urine red like ketchup), or persistent nausea, or with increased unrelieved pain. Continue to drink plenty of fluids and avoid straining as well as constipation which may be a side effect from taking narcotic pain meds. Follow-up with MD in office for post-op check as well as with PMD as advised for continuity of care.  Maintain Lopez catheter to gravity drainage leg bag as instructed, remember hand washing and infection prevention measures in order to prevent infection.

## 2023-11-17 NOTE — DIETITIAN INITIAL EVALUATION ADULT - NSICDXPASTMEDICALHX_GEN_ALL_CORE_FT
PAST MEDICAL HISTORY:  Calcification of aorta     COPD (chronic obstructive pulmonary disease)     Dementia     Depression     Duodenal ulcer     Duodenal ulcer disease     Elevated LFTs     Emphysema     H/O: depression     Hepatitis C patients daughter states that she was told that the patient has hepitatis c    History of hepatitis C     HLD (hyperlipidemia)     HTN (hypertension)     Lung cancer     Lung nodule, multiple     Malignant neoplasm of overlapping sites of bladder     Type 2 diabetes mellitus     Vitamin D deficiency

## 2023-11-17 NOTE — PROGRESS NOTE ADULT - ASSESSMENT
89 yo M s/p TURBT 11/15/2023    11/16: no complaints, urine punch colored, irrigated no clots, Hgb stable  11/17: no complaints, urine yellow, OR cx klebsiella    Plan:  -continue zosyn  -f/u OR Cx sensitivities: klebsiella  -continue morrison  -bowel regimen  -f/u medicine  -DVT prophy, IS, OOB, ambulate

## 2023-11-17 NOTE — DISCHARGE NOTE PROVIDER - DETAILS OF MALNUTRITION DIAGNOSIS/DIAGNOSES
This patient has been assessed with a concern for Malnutrition and was treated during this hospitalization for the following Nutrition diagnosis/diagnoses:     -  11/17/2023: Severe protein-calorie malnutrition   -  11/17/2023: Underweight (BMI < 19)

## 2023-11-17 NOTE — DISCHARGE NOTE NURSING/CASE MANAGEMENT/SOCIAL WORK - PATIENT PORTAL LINK FT
You can access the FollowMyHealth Patient Portal offered by [facility] by registering at the following website: [email or web address]. By joining [facility]’s FollowMyHealth portal, you will also be able to view your health information using other applications (apps) compatible with our system.

## 2023-11-17 NOTE — DIETITIAN INITIAL EVALUATION ADULT - EDUCATION DIETARY MODIFICATIONS
Reviewed nutrition plan of care and nutritional strategies to optimize nutrition./(2) meets goals/outcomes/verbalization

## 2023-11-17 NOTE — PROGRESS NOTE ADULT - SUBJECTIVE AND OBJECTIVE BOX
Overnight events:  none    Subjective:  Pt offers no complaints    Objective:    Vital signs  T(C): , Max: 37.7 (11-16-23 @ 23:02)  HR: 101 (11-17-23 @ 06:07)  BP: 133/65 (11-17-23 @ 06:07)  SpO2: 91% (11-17-23 @ 06:07)  Wt(kg): --    Output   Prince: 450 yellow  11-16 @ 07:01  -  11-17 @ 07:00  --------------------------------------------------------  IN: 316 mL / OUT: 850 mL / NET: -534 mL        Gen: NAD  Abd: soft, nontender  : prince secured    Labs: none today                        9.1    11.54 )-----------( 382      ( 16 Nov 2023 05:45 )             29.4     15 Nov 2023 19:50    138    |  103    |  26     ----------------------------<  185    5.2     |  20     |  1.15     Ca    9.5        15 Nov 2023 19:50    OR Cx: klebsiella

## 2023-11-17 NOTE — PROGRESS NOTE ADULT - PROBLEM SELECTOR PLAN 1
-s/p TURBT on 11/15  -postop management per , IVF, pain control, punch colored urine, not on CBI, irrigate without clot, ctm urine color  -trend CBC, H/H relatively stable 9.1/29.4, no indication for transfusion  - Lopez removed  -on empiric Zosyn, OR UCx Klebsiella, DC home today on Ciprox7 days per primary team.

## 2023-11-17 NOTE — DISCHARGE NOTE NURSING/CASE MANAGEMENT/SOCIAL WORK - NSDCPECAREGIVERED_GEN_ALL_CORE
Medline and carenotes for surgical procedure TURBT, Lopez, as well as DC Medications and side effects literature for patient reference.

## 2023-11-17 NOTE — DIETITIAN INITIAL EVALUATION ADULT - ORAL INTAKE PTA/DIET HISTORY
Patient resided in nursing home PTA - daughter at bedside, per daughter, patient with diminished PO intake due to dislike of nursing home

## 2023-11-17 NOTE — DIETITIAN INITIAL EVALUATION ADULT - PERTINENT LABORATORY DATA
11-15    138  |  103  |  26<H>  ----------------------------<  185<H>  5.2   |  20<L>  |  1.15    Ca    9.5      15 Nov 2023 19:50    A1C with Estimated Average Glucose Result: 5.7 % (11-06-23 @ 11:43)    CAPILLARY BLOOD GLUCOSE  POCT Blood Glucose.: 173 mg/dL (17 Nov 2023 07:15)  POCT Blood Glucose.: 200 mg/dL (16 Nov 2023 22:27)  POCT Blood Glucose.: 157 mg/dL (16 Nov 2023 16:57)  POCT Blood Glucose.: 145 mg/dL (16 Nov 2023 10:57)

## 2023-11-17 NOTE — DISCHARGE NOTE PROVIDER - CARE PROVIDER_API CALL
Jef Lopez.  Urology  [address] [place] [address], [address] [address]  Phone: [phone]  Fax: [phone]  Follow Up Time:

## 2023-11-17 NOTE — DISCHARGE NOTE PROVIDER - HOSPITAL COURSE
89 yo M s/p TURBT 11/15/2023    11/16: no complaints, urine punch colored, irrigated no clots, Hgb stable  11/17: no complaints, urine yellow, OR cx klebsiella, morrison removed with successful TOV, pt d/c on cipro x 7 days to f/u with Dr. Lopez.   89 yo M s/p TURBT 11/15/2023    11/16: no complaints, urine punch colored, irrigated no clots, Hgb stable  11/17: no complaints, urine yellow, successful TOV, urine remained yellow, OR cx klebsiella sens to cipro, zosyn d/c placed on cipro dosed by [facility], awaiting insurance auth to return to NH  11/18: episode of sundowning overnight but easily redirectable, alert this AM, VSS, voiding well yellow urine  11/19: AVSS. VSS. Voiding without issue.   11/20 - no events  Plan:  -continue cipro X2 more days  -bowel regimen  -f/u medicine  -DVT prophy, IS, OOB to chair  -d/c to NH once insurance auth received

## 2023-11-17 NOTE — PROGRESS NOTE ADULT - SUBJECTIVE AND OBJECTIVE BOX
Dr. Alanis Dudley  Pager [phone]    PROGRESS NOTE:     Patient is a 88y old  Male who presents with a chief complaint of TURBT (17 Nov 2023 12:07)      SUBJECTIVE / OVERNIGHT EVENTS: denies chest pain or sob, daughter at bedside  ADDITIONAL REVIEW OF SYSTEMS: afebrile     MEDICATIONS  (STANDING):  atorvastatin 10 milliGRAM(s) Oral at bedtime  budesonide  80 MICROgram(s)/formoterol 4.5 MICROgram(s) Inhaler 2 Puff(s) Inhalation two times a day  dextrose 5%. 1000 milliLiter(s) (50 mL/Hr) IV Continuous <Continuous>  dextrose 5%. 1000 milliLiter(s) (100 mL/Hr) IV Continuous <Continuous>  dextrose 50% Injectable 25 Gram(s) IV Push once  dextrose 50% Injectable 12.5 Gram(s) IV Push once  dextrose 50% Injectable 25 Gram(s) IV Push once  glucagon  Injectable 1 milliGRAM(s) IntraMuscular once  heparin   Injectable 5000 Unit(s) SubCutaneous every 8 hours  insulin lispro (ADMELOG) corrective regimen sliding scale   SubCutaneous three times a day before meals  insulin lispro (ADMELOG) corrective regimen sliding scale   SubCutaneous at bedtime  lactated ringers. 1000 milliLiter(s) (79 mL/Hr) IV Continuous <Continuous>  memantine 5 milliGRAM(s) Oral two times a day  mirtazapine 22.5 milliGRAM(s) Oral at bedtime  montelukast 10 milliGRAM(s) Oral daily  PARoxetine 60 milliGRAM(s) Oral daily  piperacillin/tazobactam IVPB.. 3.375 Gram(s) IV Intermittent every 8 hours  polyethylene glycol 3350 17 Gram(s) Oral daily  tamsulosin 0.4 milliGRAM(s) Oral at bedtime  tiotropium 2.5 MICROgram(s) Inhaler 2 Puff(s) Inhalation daily    MEDICATIONS  (PRN):  acetaminophen     Tablet .. 650 milliGRAM(s) Oral every 6 hours PRN Mild Pain (1 - 3), Moderate Pain (4 - 6)  albuterol    90 MICROgram(s) HFA Inhaler 2 Puff(s) Inhalation every 6 hours PRN Shortness of Breath and/or Wheezing  artificial tears (preservative free) Ophthalmic Solution 1 Drop(s) Both EYES two times a day PRN Dry Eyes  bisacodyl Suppository 10 milliGRAM(s) Rectal daily PRN Constipation  dextrose Oral Gel 15 Gram(s) Oral once PRN Blood Glucose LESS THAN 70 milliGRAM(s)/deciliter  lidocaine 4% Cream 1 Application(s) Topical every 4 hours PRN morrison/meatus pain      CAPILLARY BLOOD GLUCOSE      POCT Blood Glucose.: 187 mg/dL (17 Nov 2023 11:29)  POCT Blood Glucose.: 173 mg/dL (17 Nov 2023 07:15)  POCT Blood Glucose.: 200 mg/dL (16 Nov 2023 22:27)  POCT Blood Glucose.: 157 mg/dL (16 Nov 2023 16:57)    I&O's Summary    16 Nov 2023 07:01  -  17 Nov 2023 07:00  --------------------------------------------------------  IN: 316 mL / OUT: 850 mL / NET: -534 mL    17 Nov 2023 07:01  -  17 Nov 2023 13:49  --------------------------------------------------------  IN: 158 mL / OUT: 140 mL / NET: 18 mL        PHYSICAL EXAM:  Vital Signs Last 24 Hrs  T(C): 37.2 (17 Nov 2023 13:32), Max: 37.7 (16 Nov 2023 23:02)  T(F): 99 (17 Nov 2023 13:32), Max: 99.9 (16 Nov 2023 23:02)  HR: 100 (17 Nov 2023 13:32) (98 - 111)  BP: 109/50 (17 Nov 2023 13:32) (100/43 - 133/65)  BP(mean): --  RR: 18 (17 Nov 2023 13:32) (17 - 18)  SpO2: 96% (17 Nov 2023 13:32) (91% - 97%)    Parameters below as of 17 Nov 2023 13:32  Patient On (Oxygen Delivery Method): room air      GENERAL: NAD, frail thin elderly man  HEAD:  Atraumatic, Normocephalic  EYES: EOMI,, conjunctiva and sclera clear  NECK: Supple, No JVD  CHEST/LUNG: Clear , no wheeze, decreased air entry b/l  HEART: Regular rate and rhythm; No murmurs, rubs, or gallops  ABDOMEN: Soft, Nontender, Nondistended; Bowel sounds present  : morrison in place draining punch colored urine  EXTREMITIES:  2+ Peripheral Pulses, No clubbing, cyanosis, or edema  PSYCH: AAOx2 (person, place)  NEUROLOGY: non-focal  SKIN: No rashes or lesions    LABS:                        9.1    11.54 )-----------( 382      ( 16 Nov 2023 05:45 )             29.4     11-15    138  |  103  |  26<H>  ----------------------------<  185<H>  5.2   |  20<L>  |  1.15    Ca    9.5      15 Nov 2023 19:50            Urinalysis Basic - ( 15 Nov 2023 19:50 )    Color: x / Appearance: x / SG: x / pH: x  Gluc: 185 mg/dL / Ketone: x  / Bili: x / Urobili: x   Blood: x / Protein: x / Nitrite: x   Leuk Esterase: x / RBC: x / WBC x   Sq Epi: x / Non Sq Epi: x / Bacteria: x        Culture - Urine (collected 15 Nov 2023 20:22)  Source: OR Collect BLADDER  Preliminary Report (16 Nov 2023 22:57):    >100,000 CFU/ml Klebsiella aerogenes (Previously Enterobacter)        RADIOLOGY & ADDITIONAL TESTS:  Results Reviewed:   Imaging Personally Reviewed:  Electrocardiogram Personally Reviewed:    COORDINATION OF CARE:  Care Discussed with Consultants/Other Providers [Y/N]:  Prior or Outpatient Records Reviewed [Y/N]:

## 2023-11-17 NOTE — DISCHARGE NOTE PROVIDER - NSDCMRMEDTOKEN_GEN_ALL_CORE_FT
acetaminophen 325 mg oral tablet: 2 tab(s) orally every 6 hours As needed Mild Pain (1 - 3), Moderate Pain (4 - 6)  Albuterol (Eqv-ProAir HFA) 90 mcg/inh inhalation aerosol: 2 puff(s) inhaled every 6 hours as needed  albuterol 2.5 mg/3 mL (0.083%) inhalation solution: 3 milliliter(s) by nebulizer as needed for SOB lung sounds pre and post nebulizer treatment  atorvastatin 10 mg oral tablet: 1 tab(s) orally once a day  budesonide-formoterol 80 mcg-4.5 mcg/inh inhalation aerosol: 2 puff(s) inhaled 2 times a day  Cipro 500 mg oral tablet: 1 tab(s) orally 2 times a day for 7 days  Colace 100 mg oral capsule: 2 cap(s) orally once a day (at bedtime)  Dulcolax Laxative 10 mg rectal suppository: 1 suppository(ies) rectally once a day as needed  Fleet Enema: insert 1 unit rectally as needed for constipation x 1 dose if Dulcolax suppository ineffective  Flomax 0.4 mg oral capsule: 1 cap(s) orally once a day  GlycoLax oral powder for reconstitution: 17 each orally once a day  metFORMIN 500 mg oral tablet: 1 tab(s) orally 2 times a day  milk of magnesia suspension 400mg/5ml: 30 ml orally once a day as needed  Namenda 5 mg oral tablet: 1 tab(s) orally 2 times a day  PARoxetine 20 mg oral tablet: 1 tab(s) orally once a day  PARoxetine 40 mg oral tablet: 1 tab(s) orally once a day  Refresh ophthalmic solution: 1 drop(s) in each eye 2 times a day  Remeron 15 mg oral tablet: 0.5 tab(s) orally once a day (at bedtime)  Remeron 15 mg oral tablet: 1 tab(s) orally once a day (at bedtime)  Singulair 10 mg oral tablet: 1 tab(s) orally once a day  tiotropium 18 mcg inhalation capsule: 1 cap(s) inhaled once a day   acetaminophen 325 mg oral tablet: 2 tab(s) orally every 6 hours As needed Mild Pain (1 - 3), Moderate Pain (4 - 6)  Albuterol (Eqv-ProAir HFA) 90 mcg/inh inhalation aerosol: 2 puff(s) inhaled every 6 hours as needed  albuterol 2.5 mg/3 mL (0.083%) inhalation solution: 3 milliliter(s) by nebulizer as needed for SOB lung sounds pre and post nebulizer treatment  atorvastatin 10 mg oral tablet: 1 tab(s) orally once a day  budesonide-formoterol 80 mcg-4.5 mcg/inh inhalation aerosol: 2 puff(s) inhaled 2 times a day  Colace 100 mg oral capsule: 2 cap(s) orally once a day (at bedtime)  Dulcolax Laxative 10 mg rectal suppository: 1 suppository(ies) rectally once a day as needed  Fleet Enema: insert 1 unit rectally as needed for constipation x 1 dose if Dulcolax suppository ineffective  Flomax 0.4 mg oral capsule: 1 cap(s) orally once a day  GlycoLax oral powder for reconstitution: 17 each orally once a day  metFORMIN 500 mg oral tablet: 1 tab(s) orally 2 times a day  milk of magnesia suspension 400mg/5ml: 30 ml orally once a day as needed  Namenda 5 mg oral tablet: 1 tab(s) orally 2 times a day  PARoxetine 20 mg oral tablet: 1 tab(s) orally once a day  PARoxetine 40 mg oral tablet: 1 tab(s) orally once a day  Refresh ophthalmic solution: 1 drop(s) in each eye 2 times a day  Remeron 15 mg oral tablet: 0.5 tab(s) orally once a day (at bedtime)  Remeron 15 mg oral tablet: 1 tab(s) orally once a day (at bedtime)  Singulair 10 mg oral tablet: 1 tab(s) orally once a day  tiotropium 18 mcg inhalation capsule: 1 cap(s) inhaled once a day   acetaminophen 325 mg oral tablet: 2 tab(s) orally every 6 hours As needed Mild Pain (1 - 3), Moderate Pain (4 - 6)  Albuterol (Eqv-ProAir HFA) 90 mcg/inh inhalation aerosol: 2 puff(s) inhaled every 6 hours as needed  albuterol 2.5 mg/3 mL (0.083%) inhalation solution: 3 milliliter(s) by nebulizer as needed for SOB lung sounds pre and post nebulizer treatment  atorvastatin 10 mg oral tablet: 1 tab(s) orally once a day  budesonide-formoterol 80 mcg-4.5 mcg/inh inhalation aerosol: 2 puff(s) inhaled 2 times a day  Cipro 500 mg oral tablet: 1 tab(s) orally 2 times a day through Tues 11/21  Colace 100 mg oral capsule: 2 cap(s) orally once a day (at bedtime)  Dulcolax Laxative 10 mg rectal suppository: 1 suppository(ies) rectally once a day as needed  Fleet Enema: insert 1 unit rectally as needed for constipation x 1 dose if Dulcolax suppository ineffective  Flomax 0.4 mg oral capsule: 1 cap(s) orally once a day  GlycoLax oral powder for reconstitution: 17 each orally once a day  metFORMIN 500 mg oral tablet: 1 tab(s) orally 2 times a day  milk of magnesia suspension 400mg/5ml: 30 ml orally once a day as needed  Namenda 5 mg oral tablet: 1 tab(s) orally 2 times a day  PARoxetine 20 mg oral tablet: 1 tab(s) orally once a day  PARoxetine 40 mg oral tablet: 1 tab(s) orally once a day  Refresh ophthalmic solution: 1 drop(s) in each eye 2 times a day  Remeron 15 mg oral tablet: 0.5 tab(s) orally once a day (at bedtime)  Remeron 15 mg oral tablet: 1 tab(s) orally once a day (at bedtime)  Singulair 10 mg oral tablet: 1 tab(s) orally once a day  tiotropium 18 mcg inhalation capsule: 1 cap(s) inhaled once a day

## 2023-11-17 NOTE — DIETITIAN INITIAL EVALUATION ADULT - PERTINENT MEDS FT
MEDICATIONS  (STANDING):  atorvastatin 10 milliGRAM(s) Oral at bedtime  budesonide  80 MICROgram(s)/formoterol 4.5 MICROgram(s) Inhaler 2 Puff(s) Inhalation two times a day  dextrose 5%. 1000 milliLiter(s) (50 mL/Hr) IV Continuous <Continuous>  dextrose 5%. 1000 milliLiter(s) (100 mL/Hr) IV Continuous <Continuous>  dextrose 50% Injectable 25 Gram(s) IV Push once  dextrose 50% Injectable 12.5 Gram(s) IV Push once  dextrose 50% Injectable 25 Gram(s) IV Push once  glucagon  Injectable 1 milliGRAM(s) IntraMuscular once  heparin   Injectable 5000 Unit(s) SubCutaneous every 8 hours  insulin lispro (ADMELOG) corrective regimen sliding scale   SubCutaneous three times a day before meals  insulin lispro (ADMELOG) corrective regimen sliding scale   SubCutaneous at bedtime  lactated ringers. 1000 milliLiter(s) (79 mL/Hr) IV Continuous <Continuous>  memantine 5 milliGRAM(s) Oral two times a day  mirtazapine 22.5 milliGRAM(s) Oral at bedtime  montelukast 10 milliGRAM(s) Oral daily  PARoxetine 60 milliGRAM(s) Oral daily  piperacillin/tazobactam IVPB.. 3.375 Gram(s) IV Intermittent every 8 hours  polyethylene glycol 3350 17 Gram(s) Oral daily  tamsulosin 0.4 milliGRAM(s) Oral at bedtime  tiotropium 2.5 MICROgram(s) Inhaler 2 Puff(s) Inhalation daily    MEDICATIONS  (PRN):  acetaminophen     Tablet .. 650 milliGRAM(s) Oral every 6 hours PRN Mild Pain (1 - 3), Moderate Pain (4 - 6)  albuterol    90 MICROgram(s) HFA Inhaler 2 Puff(s) Inhalation every 6 hours PRN Shortness of Breath and/or Wheezing  artificial tears (preservative free) Ophthalmic Solution 1 Drop(s) Both EYES two times a day PRN Dry Eyes  bisacodyl Suppository 10 milliGRAM(s) Rectal daily PRN Constipation  dextrose Oral Gel 15 Gram(s) Oral once PRN Blood Glucose LESS THAN 70 milliGRAM(s)/deciliter  lidocaine 4% Cream 1 Application(s) Topical every 4 hours PRN morrison/meatus pain

## 2023-11-18 LAB
GLUCOSE BLDC GLUCOMTR-MCNC: 138 MG/DL — HIGH (ref 70–99)
GLUCOSE BLDC GLUCOMTR-MCNC: 138 MG/DL — HIGH (ref 70–99)
GLUCOSE BLDC GLUCOMTR-MCNC: 143 MG/DL — HIGH (ref 70–99)
GLUCOSE BLDC GLUCOMTR-MCNC: 143 MG/DL — HIGH (ref 70–99)
GLUCOSE BLDC GLUCOMTR-MCNC: 160 MG/DL — HIGH (ref 70–99)
GLUCOSE BLDC GLUCOMTR-MCNC: 160 MG/DL — HIGH (ref 70–99)
GLUCOSE BLDC GLUCOMTR-MCNC: 177 MG/DL — HIGH (ref 70–99)
GLUCOSE BLDC GLUCOMTR-MCNC: 177 MG/DL — HIGH (ref 70–99)

## 2023-11-18 PROCEDURE — 99232 SBSQ HOSP IP/OBS MODERATE 35: CPT

## 2023-11-18 RX ORDER — LANOLIN ALCOHOL/MO/W.PET/CERES
3 CREAM (GRAM) TOPICAL AT BEDTIME
Refills: 0 | Status: DISCONTINUED | OUTPATIENT
Start: 2023-11-18 | End: 2023-11-20

## 2023-11-18 RX ADMIN — BUDESONIDE AND FORMOTEROL FUMARATE DIHYDRATE 2 PUFF(S): 160; 4.5 AEROSOL RESPIRATORY (INHALATION) at 22:08

## 2023-11-18 RX ADMIN — HEPARIN SODIUM 5000 UNIT(S): 5000 INJECTION INTRAVENOUS; SUBCUTANEOUS at 22:08

## 2023-11-18 RX ADMIN — TAMSULOSIN HYDROCHLORIDE 0.4 MILLIGRAM(S): 0.4 CAPSULE ORAL at 22:08

## 2023-11-18 RX ADMIN — HEPARIN SODIUM 5000 UNIT(S): 5000 INJECTION INTRAVENOUS; SUBCUTANEOUS at 17:40

## 2023-11-18 RX ADMIN — MEMANTINE HYDROCHLORIDE 5 MILLIGRAM(S): 10 TABLET ORAL at 06:17

## 2023-11-18 RX ADMIN — Medication 1: at 11:36

## 2023-11-18 RX ADMIN — Medication 3 MILLIGRAM(S): at 22:08

## 2023-11-18 RX ADMIN — TIOTROPIUM BROMIDE 2 PUFF(S): 18 CAPSULE ORAL; RESPIRATORY (INHALATION) at 09:46

## 2023-11-18 RX ADMIN — HEPARIN SODIUM 5000 UNIT(S): 5000 INJECTION INTRAVENOUS; SUBCUTANEOUS at 09:49

## 2023-11-18 RX ADMIN — MEMANTINE HYDROCHLORIDE 5 MILLIGRAM(S): 10 TABLET ORAL at 17:34

## 2023-11-18 RX ADMIN — ATORVASTATIN CALCIUM 10 MILLIGRAM(S): 80 TABLET, FILM COATED ORAL at 22:08

## 2023-11-18 RX ADMIN — Medication 1: at 07:28

## 2023-11-18 RX ADMIN — MONTELUKAST 10 MILLIGRAM(S): 4 TABLET, CHEWABLE ORAL at 12:21

## 2023-11-18 RX ADMIN — BUDESONIDE AND FORMOTEROL FUMARATE DIHYDRATE 2 PUFF(S): 160; 4.5 AEROSOL RESPIRATORY (INHALATION) at 09:46

## 2023-11-18 RX ADMIN — Medication 60 MILLIGRAM(S): at 12:25

## 2023-11-18 RX ADMIN — Medication 500 MILLIGRAM(S): at 17:33

## 2023-11-18 RX ADMIN — MIRTAZAPINE 22.5 MILLIGRAM(S): 45 TABLET, ORALLY DISINTEGRATING ORAL at 22:09

## 2023-11-18 NOTE — PROGRESS NOTE ADULT - ASSESSMENT
87 yo M s/p TURBT 11/15/2023    11/16: no complaints, urine punch colored, irrigated no clots, Hgb stable  11/17: no complaints, urine yellow, successful TOV, urine remained yellow, OR cx klebsiella sens to cipro, zosyn d/c placed on cipro dosed by [facility], awaiting insurance auth to return to NH  11/18: episode of sundowning overnight but easily redirectable, alert this AM, VSS, voiding well yellow urine    Plan:  -continue cipro  -bowel regimen  -f/u medicine  -DVT prophy, IS, OOB to chair  -d/c to NH once insurance auth received

## 2023-11-18 NOTE — PROGRESS NOTE ADULT - SUBJECTIVE AND OBJECTIVE BOX
Dr. Alanis Dudley  Pager [phone]    PROGRESS NOTE:     Patient is a 88y old  Male who presents with a chief complaint of s/p surgery (17 Nov 2023 13:48)      SUBJECTIVE / OVERNIGHT EVENTS: denies chest pain or sob   ADDITIONAL REVIEW OF SYSTEMS: afebrile , per daughter he was sundowning last night but redirectable    MEDICATIONS  (STANDING):  atorvastatin 10 milliGRAM(s) Oral at bedtime  budesonide  80 MICROgram(s)/formoterol 4.5 MICROgram(s) Inhaler 2 Puff(s) Inhalation two times a day  ciprofloxacin     Tablet 500 milliGRAM(s) Oral daily  dextrose 5%. 1000 milliLiter(s) (50 mL/Hr) IV Continuous <Continuous>  dextrose 5%. 1000 milliLiter(s) (100 mL/Hr) IV Continuous <Continuous>  dextrose 50% Injectable 25 Gram(s) IV Push once  dextrose 50% Injectable 12.5 Gram(s) IV Push once  dextrose 50% Injectable 25 Gram(s) IV Push once  glucagon  Injectable 1 milliGRAM(s) IntraMuscular once  heparin   Injectable 5000 Unit(s) SubCutaneous every 8 hours  insulin lispro (ADMELOG) corrective regimen sliding scale   SubCutaneous three times a day before meals  insulin lispro (ADMELOG) corrective regimen sliding scale   SubCutaneous at bedtime  melatonin 3 milliGRAM(s) Oral at bedtime  memantine 5 milliGRAM(s) Oral two times a day  mirtazapine 22.5 milliGRAM(s) Oral at bedtime  montelukast 10 milliGRAM(s) Oral daily  PARoxetine 60 milliGRAM(s) Oral daily  polyethylene glycol 3350 17 Gram(s) Oral daily  tamsulosin 0.4 milliGRAM(s) Oral at bedtime  tiotropium 2.5 MICROgram(s) Inhaler 2 Puff(s) Inhalation daily    MEDICATIONS  (PRN):  acetaminophen     Tablet .. 650 milliGRAM(s) Oral every 6 hours PRN Mild Pain (1 - 3), Moderate Pain (4 - 6)  albuterol    90 MICROgram(s) HFA Inhaler 2 Puff(s) Inhalation every 6 hours PRN Shortness of Breath and/or Wheezing  artificial tears (preservative free) Ophthalmic Solution 1 Drop(s) Both EYES two times a day PRN Dry Eyes  bisacodyl Suppository 10 milliGRAM(s) Rectal daily PRN Constipation  dextrose Oral Gel 15 Gram(s) Oral once PRN Blood Glucose LESS THAN 70 milliGRAM(s)/deciliter  lidocaine 4% Cream 1 Application(s) Topical every 4 hours PRN morrison/meatus pain      CAPILLARY BLOOD GLUCOSE      POCT Blood Glucose.: 160 mg/dL (18 Nov 2023 11:23)  POCT Blood Glucose.: 177 mg/dL (18 Nov 2023 07:15)  POCT Blood Glucose.: 163 mg/dL (17 Nov 2023 21:26)  POCT Blood Glucose.: 140 mg/dL (17 Nov 2023 16:45)    I&O's Summary    17 Nov 2023 07:01  -  18 Nov 2023 07:00  --------------------------------------------------------  IN: 474 mL / OUT: 1290 mL / NET: -816 mL    18 Nov 2023 07:01  -  18 Nov 2023 12:20  --------------------------------------------------------  IN: 0 mL / OUT: 150 mL / NET: -150 mL        PHYSICAL EXAM:  Vital Signs Last 24 Hrs  T(C): 36.6 (18 Nov 2023 09:54), Max: 37.5 (17 Nov 2023 21:02)  T(F): 97.9 (18 Nov 2023 09:54), Max: 99.5 (17 Nov 2023 21:02)  HR: 99 (18 Nov 2023 09:54) (94 - 108)  BP: 120/56 (18 Nov 2023 09:54) (109/50 - 137/70)  BP(mean): --  RR: 16 (18 Nov 2023 09:54) (16 - 18)  SpO2: 94% (18 Nov 2023 09:54) (91% - 100%)    Parameters below as of 18 Nov 2023 09:54  Patient On (Oxygen Delivery Method): room air    GENERAL: NAD, frail thin elderly man  HEAD:  Atraumatic, Normocephalic  EYES: EOMI,, conjunctiva and sclera clear  NECK: Supple, No JVD  CHEST/LUNG: Clear , no wheeze, decreased air entry b/l  HEART: Regular rate and rhythm; No murmurs, rubs, or gallops  ABDOMEN: Soft, Nontender, Nondistended; Bowel sounds present  : morrison removed   EXTREMITIES:  2+ Peripheral Pulses, No clubbing, cyanosis, or edema  PSYCH: AAOx2 (person, place)  NEUROLOGY: non-focal  SKIN: No rashes or lesions    LABS:          Culture - Urine (collected 15 Nov 2023 20:22)  Source: OR Collect BLADDER  Final Report (17 Nov 2023 16:22):    >100,000 CFU/ml Klebsiella aerogenes (Previously Enterobacter)  Organism: Klebsiella aerogenes (Previously Enterobacter) (17 Nov 2023 16:22)  Organism: Klebsiella aerogenes (Previously Enterobacter) (17 Nov 2023 16:22)    RADIOLOGY & ADDITIONAL TESTS:  Results Reviewed:   Imaging Personally Reviewed:  Electrocardiogram Personally Reviewed:    COORDINATION OF CARE:  Care Discussed with Consultants/Other Providers [Y/N]: urology BEATA Marvin, kayla for NH insurance auth  Prior or Outpatient Records Reviewed [Y/N]:

## 2023-11-18 NOTE — PROGRESS NOTE ADULT - SUBJECTIVE AND OBJECTIVE BOX
Overnight events:  Episode of sundowning but easily redirectable    Subjective:  Pt offers no complaints, voiding well yellow urine    Objective:    Vital signs  T(C): , Max: 37.5 (11-17-23 @ 21:02)  HR: 99 (11-18-23 @ 06:15)  BP: 122/73 (11-18-23 @ 06:15)  SpO2: 94% (11-18-23 @ 06:15)  Wt(kg): --    Output     11-17 @ 07:01  -  11-18 @ 07:00  --------------------------------------------------------  IN: 474 mL / OUT: 1290 mL / NET: -816 mL        Gen: NAD  Abd: soft, nontender      Labs: none today

## 2023-11-18 NOTE — PROGRESS NOTE ADULT - PROBLEM SELECTOR PLAN 1
-s/p TURBT on 11/15  -postop management per , IVF, pain control, punch colored urine, not on CBI, irrigate without clot, ctm urine color  -trend CBC, H/H relatively stable 9.1/29.4, no indication for transfusion  - Lopez removed  -s/p Zosyn,  OR UCx Klebsiella, changed to Cipro 500 mg qd x7 day course per primary team.  - medically cleared for DC, but waiting for insurance auth for NH  Updated pt's daughter at bedside.

## 2023-11-18 NOTE — PROGRESS NOTE ADULT - ASSESSMENT
87 y/o male NH resident with h/o HTN, DM2, HLD, extensive emphysema/COPD, Dementia, Depression, Hep C, Duodenal ulcer diease, Lung nodules/lung cancer s/p right lung surgery in 2014 w/o chemo/RT, subdural hygromas, who has had hematuria for few months, found to have bladder tumor, s/p TURBT on 11/15/23

## 2023-11-19 LAB
GLUCOSE BLDC GLUCOMTR-MCNC: 125 MG/DL — HIGH (ref 70–99)
GLUCOSE BLDC GLUCOMTR-MCNC: 125 MG/DL — HIGH (ref 70–99)
GLUCOSE BLDC GLUCOMTR-MCNC: 134 MG/DL — HIGH (ref 70–99)
GLUCOSE BLDC GLUCOMTR-MCNC: 134 MG/DL — HIGH (ref 70–99)
GLUCOSE BLDC GLUCOMTR-MCNC: 141 MG/DL — HIGH (ref 70–99)
GLUCOSE BLDC GLUCOMTR-MCNC: 141 MG/DL — HIGH (ref 70–99)
GLUCOSE BLDC GLUCOMTR-MCNC: 189 MG/DL — HIGH (ref 70–99)
GLUCOSE BLDC GLUCOMTR-MCNC: 189 MG/DL — HIGH (ref 70–99)
HCT VFR BLD CALC: 26.2 % — LOW (ref 39–50)
HCT VFR BLD CALC: 26.2 % — LOW (ref 39–50)
HGB BLD-MCNC: 8.1 G/DL — LOW (ref 13–17)
HGB BLD-MCNC: 8.1 G/DL — LOW (ref 13–17)
MCHC RBC-ENTMCNC: 25 PG — LOW (ref 27–34)
MCHC RBC-ENTMCNC: 25 PG — LOW (ref 27–34)
MCHC RBC-ENTMCNC: 30.9 GM/DL — LOW (ref 32–36)
MCHC RBC-ENTMCNC: 30.9 GM/DL — LOW (ref 32–36)
MCV RBC AUTO: 80.9 FL — SIGNIFICANT CHANGE UP (ref 80–100)
MCV RBC AUTO: 80.9 FL — SIGNIFICANT CHANGE UP (ref 80–100)
NRBC # BLD: 0 /100 WBCS — SIGNIFICANT CHANGE UP (ref 0–0)
NRBC # BLD: 0 /100 WBCS — SIGNIFICANT CHANGE UP (ref 0–0)
NRBC # FLD: 0 K/UL — SIGNIFICANT CHANGE UP (ref 0–0)
NRBC # FLD: 0 K/UL — SIGNIFICANT CHANGE UP (ref 0–0)
PLATELET # BLD AUTO: 361 K/UL — SIGNIFICANT CHANGE UP (ref 150–400)
PLATELET # BLD AUTO: 361 K/UL — SIGNIFICANT CHANGE UP (ref 150–400)
RBC # BLD: 3.24 M/UL — LOW (ref 4.2–5.8)
RBC # BLD: 3.24 M/UL — LOW (ref 4.2–5.8)
RBC # FLD: 16.7 % — HIGH (ref 10.3–14.5)
RBC # FLD: 16.7 % — HIGH (ref 10.3–14.5)
T3 SERPL-MCNC: 93 NG/DL — SIGNIFICANT CHANGE UP (ref 80–200)
T3 SERPL-MCNC: 93 NG/DL — SIGNIFICANT CHANGE UP (ref 80–200)
T4 AB SER-ACNC: 6.96 UG/DL — SIGNIFICANT CHANGE UP (ref 5.1–13)
T4 AB SER-ACNC: 6.96 UG/DL — SIGNIFICANT CHANGE UP (ref 5.1–13)
T4 FREE SERPL-MCNC: 1.3 NG/DL — SIGNIFICANT CHANGE UP (ref 0.9–1.8)
T4 FREE SERPL-MCNC: 1.3 NG/DL — SIGNIFICANT CHANGE UP (ref 0.9–1.8)
TSH SERPL-MCNC: 2.92 UIU/ML — SIGNIFICANT CHANGE UP (ref 0.27–4.2)
TSH SERPL-MCNC: 2.92 UIU/ML — SIGNIFICANT CHANGE UP (ref 0.27–4.2)
WBC # BLD: 9.98 K/UL — SIGNIFICANT CHANGE UP (ref 3.8–10.5)
WBC # BLD: 9.98 K/UL — SIGNIFICANT CHANGE UP (ref 3.8–10.5)
WBC # FLD AUTO: 9.98 K/UL — SIGNIFICANT CHANGE UP (ref 3.8–10.5)
WBC # FLD AUTO: 9.98 K/UL — SIGNIFICANT CHANGE UP (ref 3.8–10.5)

## 2023-11-19 PROCEDURE — 99232 SBSQ HOSP IP/OBS MODERATE 35: CPT

## 2023-11-19 PROCEDURE — 93010 ELECTROCARDIOGRAM REPORT: CPT

## 2023-11-19 RX ORDER — SODIUM CHLORIDE 9 MG/ML
500 INJECTION, SOLUTION INTRAVENOUS ONCE
Refills: 0 | Status: COMPLETED | OUTPATIENT
Start: 2023-11-19 | End: 2023-11-19

## 2023-11-19 RX ADMIN — SODIUM CHLORIDE 500 MILLILITER(S): 9 INJECTION, SOLUTION INTRAVENOUS at 14:07

## 2023-11-19 RX ADMIN — ALBUTEROL 2 PUFF(S): 90 AEROSOL, METERED ORAL at 22:09

## 2023-11-19 RX ADMIN — TIOTROPIUM BROMIDE 2 PUFF(S): 18 CAPSULE ORAL; RESPIRATORY (INHALATION) at 11:09

## 2023-11-19 RX ADMIN — MEMANTINE HYDROCHLORIDE 5 MILLIGRAM(S): 10 TABLET ORAL at 18:37

## 2023-11-19 RX ADMIN — Medication 500 MILLIGRAM(S): at 14:06

## 2023-11-19 RX ADMIN — ATORVASTATIN CALCIUM 10 MILLIGRAM(S): 80 TABLET, FILM COATED ORAL at 21:54

## 2023-11-19 RX ADMIN — MEMANTINE HYDROCHLORIDE 5 MILLIGRAM(S): 10 TABLET ORAL at 07:19

## 2023-11-19 RX ADMIN — MIRTAZAPINE 22.5 MILLIGRAM(S): 45 TABLET, ORALLY DISINTEGRATING ORAL at 21:55

## 2023-11-19 RX ADMIN — TAMSULOSIN HYDROCHLORIDE 0.4 MILLIGRAM(S): 0.4 CAPSULE ORAL at 21:53

## 2023-11-19 RX ADMIN — Medication 3 MILLIGRAM(S): at 21:53

## 2023-11-19 RX ADMIN — BUDESONIDE AND FORMOTEROL FUMARATE DIHYDRATE 2 PUFF(S): 160; 4.5 AEROSOL RESPIRATORY (INHALATION) at 21:54

## 2023-11-19 RX ADMIN — Medication 60 MILLIGRAM(S): at 14:07

## 2023-11-19 RX ADMIN — MONTELUKAST 10 MILLIGRAM(S): 4 TABLET, CHEWABLE ORAL at 14:07

## 2023-11-19 RX ADMIN — HEPARIN SODIUM 5000 UNIT(S): 5000 INJECTION INTRAVENOUS; SUBCUTANEOUS at 07:19

## 2023-11-19 RX ADMIN — HEPARIN SODIUM 5000 UNIT(S): 5000 INJECTION INTRAVENOUS; SUBCUTANEOUS at 14:06

## 2023-11-19 RX ADMIN — BUDESONIDE AND FORMOTEROL FUMARATE DIHYDRATE 2 PUFF(S): 160; 4.5 AEROSOL RESPIRATORY (INHALATION) at 11:09

## 2023-11-19 RX ADMIN — HEPARIN SODIUM 5000 UNIT(S): 5000 INJECTION INTRAVENOUS; SUBCUTANEOUS at 21:54

## 2023-11-19 NOTE — PROGRESS NOTE ADULT - SUBJECTIVE AND OBJECTIVE BOX
HPI  Afebrile, tachy overnight to 114 now improved to 106. Voiding without issue. Tolerating regular diet.     PAST MEDICAL & SURGICAL HISTORY:  Lung nodule, multiple      COPD (chronic obstructive pulmonary disease)      Depression      Emphysema      Duodenal ulcer disease      Hepatitis C  patients daughter states that she was told that the patient has hepitatis c      HLD (hyperlipidemia)      Type 2 diabetes mellitus      Duodenal ulcer      Elevated LFTs      History of hepatitis C      HTN (hypertension)      Lung cancer      Vitamin D deficiency      Malignant neoplasm of overlapping sites of bladder      Dementia      Calcification of aorta      H/O: depression      History of lung surgery      S/P hemorrhoidectomy          MEDICATIONS  (STANDING):  atorvastatin 10 milliGRAM(s) Oral at bedtime  budesonide  80 MICROgram(s)/formoterol 4.5 MICROgram(s) Inhaler 2 Puff(s) Inhalation two times a day  ciprofloxacin     Tablet 500 milliGRAM(s) Oral daily  dextrose 50% Injectable 25 Gram(s) IV Push once  dextrose 50% Injectable 12.5 Gram(s) IV Push once  dextrose 50% Injectable 25 Gram(s) IV Push once  glucagon  Injectable 1 milliGRAM(s) IntraMuscular once  heparin   Injectable 5000 Unit(s) SubCutaneous every 8 hours  insulin lispro (ADMELOG) corrective regimen sliding scale   SubCutaneous three times a day before meals  insulin lispro (ADMELOG) corrective regimen sliding scale   SubCutaneous at bedtime  melatonin 3 milliGRAM(s) Oral at bedtime  memantine 5 milliGRAM(s) Oral two times a day  mirtazapine 22.5 milliGRAM(s) Oral at bedtime  montelukast 10 milliGRAM(s) Oral daily  PARoxetine 60 milliGRAM(s) Oral daily  polyethylene glycol 3350 17 Gram(s) Oral daily  tamsulosin 0.4 milliGRAM(s) Oral at bedtime  tiotropium 2.5 MICROgram(s) Inhaler 2 Puff(s) Inhalation daily    MEDICATIONS  (PRN):  acetaminophen     Tablet .. 650 milliGRAM(s) Oral every 6 hours PRN Mild Pain (1 - 3), Moderate Pain (4 - 6)  albuterol    90 MICROgram(s) HFA Inhaler 2 Puff(s) Inhalation every 6 hours PRN Shortness of Breath and/or Wheezing  artificial tears (preservative free) Ophthalmic Solution 1 Drop(s) Both EYES two times a day PRN Dry Eyes  bisacodyl Suppository 10 milliGRAM(s) Rectal daily PRN Constipation  dextrose Oral Gel 15 Gram(s) Oral once PRN Blood Glucose LESS THAN 70 milliGRAM(s)/deciliter  lidocaine 4% Cream 1 Application(s) Topical every 4 hours PRN morrison/meatus pain      FAMILY HISTORY:  Family history of cerebral infarction (Father)  father  age 71        Allergies    No Known Allergies    Intolerances        SOCIAL HISTORY:    REVIEW OF SYSTEMS: Otherwise negative as stated in HPI    Physical Exam  Vital signs  T(C): 36.9 (23 @ 07:15), Max: 37.3 (23 @ 09:36)  HR: 106 (23 @ 07:15)  BP: 113/61 (23 @ 07:15)  SpO2: 92% (23 @ 07:15)  Wt(kg): --    Output    OUT:    Voided (mL): 600 mL  Total OUT: 600 mL    Total NET: -600 mL          Gen: NAD  Pulm: No respiratory distress	  CV: RRR  GI: S/ND/NT  : Voiding without issue  MSK: moves all 4 limbs spontaneously

## 2023-11-19 NOTE — PROGRESS NOTE ADULT - SUBJECTIVE AND OBJECTIVE BOX
Dr. Alanis Dudley  Pager [phone]    PROGRESS NOTE:     Patient is a 88y old  Male who presents with a chief complaint of s/p surgery (18 Nov 2023 12:19)      SUBJECTIVE / OVERNIGHT EVENTS: denies chest pain or sob , tachy overnight to 114  ADDITIONAL REVIEW OF SYSTEMS: afebrile , sleeping    MEDICATIONS  (STANDING):  atorvastatin 10 milliGRAM(s) Oral at bedtime  budesonide  80 MICROgram(s)/formoterol 4.5 MICROgram(s) Inhaler 2 Puff(s) Inhalation two times a day  ciprofloxacin     Tablet 500 milliGRAM(s) Oral daily  dextrose 50% Injectable 25 Gram(s) IV Push once  dextrose 50% Injectable 12.5 Gram(s) IV Push once  dextrose 50% Injectable 25 Gram(s) IV Push once  glucagon  Injectable 1 milliGRAM(s) IntraMuscular once  heparin   Injectable 5000 Unit(s) SubCutaneous every 8 hours  insulin lispro (ADMELOG) corrective regimen sliding scale   SubCutaneous three times a day before meals  insulin lispro (ADMELOG) corrective regimen sliding scale   SubCutaneous at bedtime  melatonin 3 milliGRAM(s) Oral at bedtime  memantine 5 milliGRAM(s) Oral two times a day  mirtazapine 22.5 milliGRAM(s) Oral at bedtime  montelukast 10 milliGRAM(s) Oral daily  PARoxetine 60 milliGRAM(s) Oral daily  polyethylene glycol 3350 17 Gram(s) Oral daily  tamsulosin 0.4 milliGRAM(s) Oral at bedtime  tiotropium 2.5 MICROgram(s) Inhaler 2 Puff(s) Inhalation daily    MEDICATIONS  (PRN):  acetaminophen     Tablet .. 650 milliGRAM(s) Oral every 6 hours PRN Mild Pain (1 - 3), Moderate Pain (4 - 6)  albuterol    90 MICROgram(s) HFA Inhaler 2 Puff(s) Inhalation every 6 hours PRN Shortness of Breath and/or Wheezing  artificial tears (preservative free) Ophthalmic Solution 1 Drop(s) Both EYES two times a day PRN Dry Eyes  bisacodyl Suppository 10 milliGRAM(s) Rectal daily PRN Constipation  dextrose Oral Gel 15 Gram(s) Oral once PRN Blood Glucose LESS THAN 70 milliGRAM(s)/deciliter  lidocaine 4% Cream 1 Application(s) Topical every 4 hours PRN morrison/meatus pain      CAPILLARY BLOOD GLUCOSE      POCT Blood Glucose.: 134 mg/dL (19 Nov 2023 07:16)  POCT Blood Glucose.: 138 mg/dL (18 Nov 2023 22:16)  POCT Blood Glucose.: 143 mg/dL (18 Nov 2023 17:04)  POCT Blood Glucose.: 160 mg/dL (18 Nov 2023 11:23)    I&O's Summary    18 Nov 2023 07:01  -  19 Nov 2023 07:00  --------------------------------------------------------  IN: 0 mL / OUT: 600 mL / NET: -600 mL    19 Nov 2023 07:01  -  19 Nov 2023 10:36  --------------------------------------------------------  IN: 240 mL / OUT: 0 mL / NET: 240 mL        PHYSICAL EXAM:  Vital Signs Last 24 Hrs  T(C): 36.9 (19 Nov 2023 07:15), Max: 36.9 (18 Nov 2023 21:22)  T(F): 98.4 (19 Nov 2023 07:15), Max: 98.5 (19 Nov 2023 01:52)  HR: 106 (19 Nov 2023 07:15) (102 - 118)  BP: 113/61 (19 Nov 2023 07:15) (108/65 - 150/74)  BP(mean): --  RR: 17 (19 Nov 2023 07:15) (17 - 18)  SpO2: 92% (19 Nov 2023 07:15) (92% - 94%)    Parameters below as of 19 Nov 2023 07:15  Patient On (Oxygen Delivery Method): room air      GENERAL: NAD, frail thin elderly man  HEAD:  Atraumatic, Normocephalic  EYES: EOMI,, conjunctiva and sclera clear  NECK: Supple, No JVD  CHEST/LUNG: Clear , no wheeze, decreased air entry b/l  HEART: Regular rate, slightly tachy; No murmurs, rubs, or gallops  ABDOMEN: Soft, Nontender, Nondistended; Bowel sounds present  : morrison removed   EXTREMITIES:  2+ Peripheral Pulses, No clubbing, cyanosis, or edema  PSYCH: AAOx2 (person, place)  NEUROLOGY: non-focal  SKIN: No rashes or lesions      LABS:                      RADIOLOGY & ADDITIONAL TESTS:  Results Reviewed:   Imaging Personally Reviewed:  Electrocardiogram Personally Reviewed:    COORDINATION OF CARE:  Care Discussed with Consultants/Other Providers [Y/N]:  Prior or Outpatient Records Reviewed [Y/N]:   Dr. Alanis Dudley  Pager [phone]    PROGRESS NOTE:     Patient is a 88y old  Male who presents with a chief complaint of s/p surgery (18 Nov 2023 12:19)      SUBJECTIVE / OVERNIGHT EVENTS: denies chest pain or sob , tachy overnight to 114  ADDITIONAL REVIEW OF SYSTEMS: afebrile , sleeping    MEDICATIONS  (STANDING):  atorvastatin 10 milliGRAM(s) Oral at bedtime  budesonide  80 MICROgram(s)/formoterol 4.5 MICROgram(s) Inhaler 2 Puff(s) Inhalation two times a day  ciprofloxacin     Tablet 500 milliGRAM(s) Oral daily  dextrose 50% Injectable 25 Gram(s) IV Push once  dextrose 50% Injectable 12.5 Gram(s) IV Push once  dextrose 50% Injectable 25 Gram(s) IV Push once  glucagon  Injectable 1 milliGRAM(s) IntraMuscular once  heparin   Injectable 5000 Unit(s) SubCutaneous every 8 hours  insulin lispro (ADMELOG) corrective regimen sliding scale   SubCutaneous three times a day before meals  insulin lispro (ADMELOG) corrective regimen sliding scale   SubCutaneous at bedtime  melatonin 3 milliGRAM(s) Oral at bedtime  memantine 5 milliGRAM(s) Oral two times a day  mirtazapine 22.5 milliGRAM(s) Oral at bedtime  montelukast 10 milliGRAM(s) Oral daily  PARoxetine 60 milliGRAM(s) Oral daily  polyethylene glycol 3350 17 Gram(s) Oral daily  tamsulosin 0.4 milliGRAM(s) Oral at bedtime  tiotropium 2.5 MICROgram(s) Inhaler 2 Puff(s) Inhalation daily    MEDICATIONS  (PRN):  acetaminophen     Tablet .. 650 milliGRAM(s) Oral every 6 hours PRN Mild Pain (1 - 3), Moderate Pain (4 - 6)  albuterol    90 MICROgram(s) HFA Inhaler 2 Puff(s) Inhalation every 6 hours PRN Shortness of Breath and/or Wheezing  artificial tears (preservative free) Ophthalmic Solution 1 Drop(s) Both EYES two times a day PRN Dry Eyes  bisacodyl Suppository 10 milliGRAM(s) Rectal daily PRN Constipation  dextrose Oral Gel 15 Gram(s) Oral once PRN Blood Glucose LESS THAN 70 milliGRAM(s)/deciliter  lidocaine 4% Cream 1 Application(s) Topical every 4 hours PRN morrison/meatus pain      CAPILLARY BLOOD GLUCOSE      POCT Blood Glucose.: 134 mg/dL (19 Nov 2023 07:16)  POCT Blood Glucose.: 138 mg/dL (18 Nov 2023 22:16)  POCT Blood Glucose.: 143 mg/dL (18 Nov 2023 17:04)  POCT Blood Glucose.: 160 mg/dL (18 Nov 2023 11:23)    I&O's Summary    18 Nov 2023 07:01  -  19 Nov 2023 07:00  --------------------------------------------------------  IN: 0 mL / OUT: 600 mL / NET: -600 mL    19 Nov 2023 07:01  -  19 Nov 2023 10:36  --------------------------------------------------------  IN: 240 mL / OUT: 0 mL / NET: 240 mL        PHYSICAL EXAM:  Vital Signs Last 24 Hrs  T(C): 36.9 (19 Nov 2023 07:15), Max: 36.9 (18 Nov 2023 21:22)  T(F): 98.4 (19 Nov 2023 07:15), Max: 98.5 (19 Nov 2023 01:52)  HR: 106 (19 Nov 2023 07:15) (102 - 118)  BP: 113/61 (19 Nov 2023 07:15) (108/65 - 150/74)  BP(mean): --  RR: 17 (19 Nov 2023 07:15) (17 - 18)  SpO2: 92% (19 Nov 2023 07:15) (92% - 94%)    Parameters below as of 19 Nov 2023 07:15  Patient On (Oxygen Delivery Method): room air      GENERAL: NAD, frail thin elderly man  HEAD:  Atraumatic, Normocephalic  EYES: EOMI,, conjunctiva and sclera clear  NECK: Supple, No JVD  CHEST/LUNG: Clear , no wheeze, decreased air entry b/l  HEART: Regular rate, slightly tachy; No murmurs, rubs, or gallops  ABDOMEN: Soft, Nontender, Nondistended; Bowel sounds present  : morrison removed   EXTREMITIES:  2+ Peripheral Pulses, No clubbing, cyanosis, or edema  PSYCH: AAOx2 (person, place)  NEUROLOGY: non-focal  SKIN: No rashes or lesions      LABS:                      RADIOLOGY & ADDITIONAL TESTS:  Results Reviewed:   Imaging Personally Reviewed:  Electrocardiogram Personally Reviewed:    COORDINATION OF CARE:  Care Discussed with Consultants/Other Providers [Y/N]: Urology resident Dr. Pablo, sinus tach, check ECG, CBC, TSH  Prior or Outpatient Records Reviewed [Y/N]:

## 2023-11-19 NOTE — PROGRESS NOTE ADULT - ATTENDING COMMENTS
s/p TURBT  voiding well  tolerating regular diet  awaiting placement back to his nursing home
Postop day #4 status post TURBT  Voiding well  Urine color yellow  Tolerating diet  Awaiting return back to nursing home  Continue Cipro

## 2023-11-19 NOTE — PROGRESS NOTE ADULT - ASSESSMENT
89 yo M s/p TURBT 11/15/2023    11/16: no complaints, urine punch colored, irrigated no clots, Hgb stable  11/17: no complaints, urine yellow, successful TOV, urine remained yellow, OR cx klebsiella sens to cipro, zosyn d/c placed on cipro dosed by [facility], awaiting insurance auth to return to NH  11/18: episode of sundowning overnight but easily redirectable, alert this AM, VSS, voiding well yellow urine  11/19: AVSS. VSS. Voiding without issue.     Plan:  -continue cipro  -bowel regimen  -f/u medicine  -DVT prophy, IS, OOB to chair  -d/c to NH once insurance auth received, planning for Monday

## 2023-11-19 NOTE — PROGRESS NOTE ADULT - PROBLEM SELECTOR PLAN 1
-s/p TURBT on 11/15  -postop management per , IVF, pain control, punch colored urine, not on CBI, irrigate without clot, ctm urine color  -trend CBC, H/H relatively stable 9.1/29.4, no indication for transfusion  - Lopez removed  -s/p Zosyn,  OR UCx Klebsiella, changed to Cipro 500 mg qd x7 day course per primary team.  - sinus tach, likely related to postop anemia, doubt PE and active infection, check TSH, trend HR  - medically cleared for DC, but waiting for insurance auth for NH -s/p TURBT on 11/15  -postop management per , IVF, pain control, punch colored urine, not on CBI, irrigate without clot, ctm urine color  -trend CBC, H/H relatively stable 9.1/29.4, no indication for transfusion  - Lopez removed  -s/p Zosyn,  OR UCx Klebsiella, changed to Cipro 500 mg qd x7 day course per primary team.  - sinus tach, likely related to postop anemia, low suspicion for PE, infection being treated   check TSH, trend HR  - medically cleared for DC, but waiting for insurance auth for NH -s/p TURBT on 11/15  -postop management per , IVF, pain control, punch colored urine, not on CBI, irrigate without clot, ctm urine color  -trend CBC, H/H relatively stable 9.1/29.4, no indication for transfusion  - Lopez removed  -s/p Zosyn,  OR UCx Klebsiella, changed to Cipro 500 mg qd x7 day course per primary team.  - sinus tach, likely related to postop anemia, low suspicion for PE, infection being treated   check CBC and TSH, trend HR  - medically cleared for DC, but waiting for insurance auth for NH -s/p TURBT on 11/15  -postop management per , IVF, pain control, punch colored urine, not on CBI, irrigate without clot, ctm urine color  -trend CBC, H/H relatively stable 9.1/29.4, no indication for transfusion  - Lopez removed  -s/p Zosyn,  OR UCx Klebsiella, changed to Cipro 500 mg qd x7 day course per primary team.  - sinus tach, likely related to postop anemia, low suspicion for PE, infection being treated   check ECG, CBC and TSH, trend HR  - medically cleared for DC, but waiting for insurance auth for NH -s/p TURBT on 11/15  -postop management per , IVF, pain control, punch colored urine, not on CBI, irrigate without clot, ctm urine color  -trend CBC, H/H relatively stable 9.1/29.4, no indication for transfusion  - Lopez removed  -s/p Zosyn,  OR UCx Klebsiella, changed to Cipro 500 mg qd x7 day course per primary team.  - sinus tach, likely related to postop anemia, low suspicion for PE, infection being treated   check ECG, CBC and TSH, trend HR, fluid bolus prn  - medically cleared for DC, but waiting for insurance auth for NH

## 2023-11-20 VITALS
SYSTOLIC BLOOD PRESSURE: 132 MMHG | HEART RATE: 101 BPM | OXYGEN SATURATION: 95 % | RESPIRATION RATE: 19 BRPM | DIASTOLIC BLOOD PRESSURE: 81 MMHG | TEMPERATURE: 98 F

## 2023-11-20 LAB
GLUCOSE BLDC GLUCOMTR-MCNC: 129 MG/DL — HIGH (ref 70–99)
GLUCOSE BLDC GLUCOMTR-MCNC: 129 MG/DL — HIGH (ref 70–99)
GLUCOSE BLDC GLUCOMTR-MCNC: 151 MG/DL — HIGH (ref 70–99)
GLUCOSE BLDC GLUCOMTR-MCNC: 151 MG/DL — HIGH (ref 70–99)
GLUCOSE BLDC GLUCOMTR-MCNC: 163 MG/DL — HIGH (ref 70–99)
GLUCOSE BLDC GLUCOMTR-MCNC: 163 MG/DL — HIGH (ref 70–99)

## 2023-11-20 PROCEDURE — 99232 SBSQ HOSP IP/OBS MODERATE 35: CPT

## 2023-11-20 RX ORDER — CIPROFLOXACIN LACTATE 400MG/40ML
1 VIAL (ML) INTRAVENOUS
Qty: 0 | Refills: 0 | DISCHARGE

## 2023-11-20 RX ADMIN — BUDESONIDE AND FORMOTEROL FUMARATE DIHYDRATE 2 PUFF(S): 160; 4.5 AEROSOL RESPIRATORY (INHALATION) at 10:44

## 2023-11-20 RX ADMIN — MEMANTINE HYDROCHLORIDE 5 MILLIGRAM(S): 10 TABLET ORAL at 17:17

## 2023-11-20 RX ADMIN — TIOTROPIUM BROMIDE 2 PUFF(S): 18 CAPSULE ORAL; RESPIRATORY (INHALATION) at 10:44

## 2023-11-20 RX ADMIN — Medication 1: at 07:35

## 2023-11-20 RX ADMIN — POLYETHYLENE GLYCOL 3350 17 GRAM(S): 17 POWDER, FOR SOLUTION ORAL at 11:49

## 2023-11-20 RX ADMIN — MEMANTINE HYDROCHLORIDE 5 MILLIGRAM(S): 10 TABLET ORAL at 05:12

## 2023-11-20 RX ADMIN — HEPARIN SODIUM 5000 UNIT(S): 5000 INJECTION INTRAVENOUS; SUBCUTANEOUS at 13:54

## 2023-11-20 RX ADMIN — ALBUTEROL 2 PUFF(S): 90 AEROSOL, METERED ORAL at 20:47

## 2023-11-20 RX ADMIN — HEPARIN SODIUM 5000 UNIT(S): 5000 INJECTION INTRAVENOUS; SUBCUTANEOUS at 05:12

## 2023-11-20 RX ADMIN — MONTELUKAST 10 MILLIGRAM(S): 4 TABLET, CHEWABLE ORAL at 11:47

## 2023-11-20 RX ADMIN — Medication 500 MILLIGRAM(S): at 11:48

## 2023-11-20 RX ADMIN — Medication 60 MILLIGRAM(S): at 11:48

## 2023-11-20 RX ADMIN — Medication 1: at 11:46

## 2023-11-20 NOTE — PROGRESS NOTE ADULT - PROBLEM SELECTOR PLAN 6
hold metformin, resume on DC  ISS, monitor FS  A1c 5.7%  cw statin for hld.

## 2023-11-20 NOTE — PROGRESS NOTE ADULT - ASSESSMENT
89 yo M s/p TURBT 11/15/2023    11/16: no complaints, urine punch colored, irrigated no clots, Hgb stable  11/17: no complaints, urine yellow, successful TOV, urine remained yellow, OR cx klebsiella sens to cipro, zosyn d/c placed on cipro dosed by [facility], awaiting insurance auth to return to NH  11/18: episode of sundowning overnight but easily redirectable, alert this AM, VSS, voiding well yellow urine  11/19: AVSS. VSS. Voiding without issue.   11/20 - no events  Plan:  -continue cipro  -bowel regimen  -f/u medicine  -DVT prophy, IS, OOB to chair  -d/c to NH once insurance auth received

## 2023-11-20 NOTE — PROGRESS NOTE ADULT - PROBLEM SELECTOR PLAN 1
-s/p TURBT on 11/15  -postop management per , IVF, pain control, punch colored urine, not on CBI, irrigate without clot, ctm urine color  -trend CBC, H/H relatively stable 9.1/29.4, no indication for transfusion  - Lopez removed  -s/p Zosyn,  OR UCx Klebsiella, changed to Cipro 500 mg qd x7 day course per primary team.  - sinus tach, likely related to postop anemia, low suspicion for PE, infection being treated   check ECG, CBC and TSH- nml, trend HR, fluid bolus prn  - medically cleared for DC, but waiting for insurance auth for NH

## 2023-11-20 NOTE — PROGRESS NOTE ADULT - NUTRITIONAL ASSESSMENT
This patient has been assessed with a concern for Malnutrition and has been determined to have a diagnosis/diagnoses of Severe protein-calorie malnutrition and Underweight (BMI < 19).    This patient is being managed with:   Diet Consistent Carbohydrate/No Snacks-  Supplement Feeding Modality:  Oral  Glucerna Shake Cans or Servings Per Day:  1       Frequency:  Two Times a day  Entered: Nov 17 2023 12:19PM  

## 2023-11-20 NOTE — PROVIDER CONTACT NOTE (OTHER) - ASSESSMENT
Pt in bed, A&Ox3. All other vitals stable at this time. See flowsheet. Pt denies the presence of pain or SOB at this time.

## 2023-11-20 NOTE — PROGRESS NOTE ADULT - PROBLEM SELECTOR PLAN 4
c/w Namenda.
c/w Namenda.  Sundowning at night, redirectable  avoid benzo/sedatives, supportive care, frequent reorientation

## 2023-11-20 NOTE — PROGRESS NOTE ADULT - PROBLEM SELECTOR PLAN 5
c/w Paxil and Remeron (22mg nightly). c/w Paxil and Remeron (22mg nightly).  Remeron dose could be too strong, may need dose reduction if continues to sleep this late, or could move dose up in the evening from 10 pm to 7pm in the evening.

## 2023-11-20 NOTE — PROGRESS NOTE ADULT - PROBLEM SELECTOR PLAN 3
former smoker, h/x COPD/emphysema, prior right lung surgery in 2014to remove lung CA, no chemo/RT  c/w home bronchodilators (proventil prn), symbicort, spiriva, singulair  supplemental O2 prn  Incentive spirometer, early mobilization.

## 2023-11-20 NOTE — PROGRESS NOTE ADULT - PROBLEM SELECTOR PLAN 2
-BP controlled, not on antihypertensives  -ctm  -IVF, avoid hypotension  Preop echo EF 55-60%, mild PAH, mild MR, mod TR, mild diastolic dysfxn.
-BP controlled, not on antihypertensives  -ctm  -regular diet  Preop echo EF 55-60%, mild PAH, mild MR, mod TR, mild diastolic dysfxn.

## 2023-11-20 NOTE — PROGRESS NOTE ADULT - SUBJECTIVE AND OBJECTIVE BOX
[facility] Division of Hospital Medicine  Marta Martin MD  Pager (M-F, 8A-5P): [phone] or TEAMS  Other Times:  [phone]      SUBJECTIVE / OVERNIGHT EVENTS: Sinus tach to 115 overnight.  EKG ***.     MEDICATIONS  (STANDING):  atorvastatin 10 milliGRAM(s) Oral at bedtime  budesonide  80 MICROgram(s)/formoterol 4.5 MICROgram(s) Inhaler 2 Puff(s) Inhalation two times a day  ciprofloxacin     Tablet 500 milliGRAM(s) Oral daily  dextrose 50% Injectable 25 Gram(s) IV Push once  dextrose 50% Injectable 25 Gram(s) IV Push once  dextrose 50% Injectable 12.5 Gram(s) IV Push once  glucagon  Injectable 1 milliGRAM(s) IntraMuscular once  heparin   Injectable 5000 Unit(s) SubCutaneous every 8 hours  insulin lispro (ADMELOG) corrective regimen sliding scale   SubCutaneous at bedtime  insulin lispro (ADMELOG) corrective regimen sliding scale   SubCutaneous three times a day before meals  melatonin 3 milliGRAM(s) Oral at bedtime  memantine 5 milliGRAM(s) Oral two times a day  mirtazapine 22.5 milliGRAM(s) Oral at bedtime  montelukast 10 milliGRAM(s) Oral daily  PARoxetine 60 milliGRAM(s) Oral daily  polyethylene glycol 3350 17 Gram(s) Oral daily  tamsulosin 0.4 milliGRAM(s) Oral at bedtime  tiotropium 2.5 MICROgram(s) Inhaler 2 Puff(s) Inhalation daily    MEDICATIONS  (PRN):  acetaminophen     Tablet .. 650 milliGRAM(s) Oral every 6 hours PRN Mild Pain (1 - 3), Moderate Pain (4 - 6)  albuterol    90 MICROgram(s) HFA Inhaler 2 Puff(s) Inhalation every 6 hours PRN Shortness of Breath and/or Wheezing  artificial tears (preservative free) Ophthalmic Solution 1 Drop(s) Both EYES two times a day PRN Dry Eyes  bisacodyl Suppository 10 milliGRAM(s) Rectal daily PRN Constipation  dextrose Oral Gel 15 Gram(s) Oral once PRN Blood Glucose LESS THAN 70 milliGRAM(s)/deciliter  lidocaine 4% Cream 1 Application(s) Topical every 4 hours PRN morrison/meatus pain      I&O's Summary    19 Nov 2023 07:01  -  20 Nov 2023 07:00  --------------------------------------------------------  IN: 360 mL / OUT: 700 mL / NET: -340 mL    20 Nov 2023 07:01  -  20 Nov 2023 09:42  --------------------------------------------------------  IN: 0 mL / OUT: 300 mL / NET: -300 mL        PHYSICAL EXAM:  Vital Signs Last 24 Hrs  T(C): 37.1 (20 Nov 2023 09:27), Max: 37.6 (20 Nov 2023 02:24)  T(F): 98.7 (20 Nov 2023 09:27), Max: 99.6 (20 Nov 2023 02:24)  HR: 102 (20 Nov 2023 09:27) (100 - 113)  BP: 124/68 (20 Nov 2023 09:27) (111/48 - 130/70)  BP(mean): --  RR: 18 (20 Nov 2023 09:27) (17 - 18)  SpO2: 96% (20 Nov 2023 09:27) (93% - 100%)    Parameters below as of 20 Nov 2023 09:27  Patient On (Oxygen Delivery Method): room air      CONSTITUTIONAL: NAD  EYES: PERRLA; conjunctiva and sclera clear  ENMT: Moist oral mucosa, no pharyngeal injection or exudates; normal dentition  RESPIRATORY: Normal respiratory effort; lungs are clear to auscultation bilaterally  CARDIOVASCULAR: Regular rate and rhythm, normal S1 and S2, no murmur/rub/gallop; No lower extremity edema; Peripheral pulses are 2+ bilaterally  ABDOMEN: Nontender to palpation, normoactive bowel sounds, no rebound/guarding; No hepatosplenomegaly  PSYCH: A+O to person, place, and time; affect appropriate  SKIN: No rashes; no palpable lesions    LABS:                        8.1    9.98  )-----------( 361      ( 19 Nov 2023 11:00 )             26.2                       RADIOLOGY & ADDITIONAL TESTS:  Results Reviewed:   Imaging Personally Reviewed:  Electrocardiogram Personally Reviewed:    COORDINATION OF CARE:  Care Discussed with Consultants/Other Providers [Y/N]:  Prior or Outpatient Records Reviewed [Y/N]:   [facility] Division of Hospital Medicine  Marta Martin MD  Pager (M-F, 8A-5P): [phone] or TEAMS  Other Times:  [phone]      SUBJECTIVE / OVERNIGHT EVENTS: Sinus tach to 115 overnight.  Pt and family member resting this morning at 10:30 am, she says he was up late last night. Sundowning has been an issue. She does not know why he is on mirtazepine at incr dose.    MEDICATIONS  (STANDING):  atorvastatin 10 milliGRAM(s) Oral at bedtime  budesonide  80 MICROgram(s)/formoterol 4.5 MICROgram(s) Inhaler 2 Puff(s) Inhalation two times a day  ciprofloxacin     Tablet 500 milliGRAM(s) Oral daily  dextrose 50% Injectable 25 Gram(s) IV Push once  dextrose 50% Injectable 25 Gram(s) IV Push once  dextrose 50% Injectable 12.5 Gram(s) IV Push once  glucagon  Injectable 1 milliGRAM(s) IntraMuscular once  heparin   Injectable 5000 Unit(s) SubCutaneous every 8 hours  insulin lispro (ADMELOG) corrective regimen sliding scale   SubCutaneous at bedtime  insulin lispro (ADMELOG) corrective regimen sliding scale   SubCutaneous three times a day before meals  melatonin 3 milliGRAM(s) Oral at bedtime  memantine 5 milliGRAM(s) Oral two times a day  mirtazapine 22.5 milliGRAM(s) Oral at bedtime  montelukast 10 milliGRAM(s) Oral daily  PARoxetine 60 milliGRAM(s) Oral daily  polyethylene glycol 3350 17 Gram(s) Oral daily  tamsulosin 0.4 milliGRAM(s) Oral at bedtime  tiotropium 2.5 MICROgram(s) Inhaler 2 Puff(s) Inhalation daily    MEDICATIONS  (PRN):  acetaminophen     Tablet .. 650 milliGRAM(s) Oral every 6 hours PRN Mild Pain (1 - 3), Moderate Pain (4 - 6)  albuterol    90 MICROgram(s) HFA Inhaler 2 Puff(s) Inhalation every 6 hours PRN Shortness of Breath and/or Wheezing  artificial tears (preservative free) Ophthalmic Solution 1 Drop(s) Both EYES two times a day PRN Dry Eyes  bisacodyl Suppository 10 milliGRAM(s) Rectal daily PRN Constipation  dextrose Oral Gel 15 Gram(s) Oral once PRN Blood Glucose LESS THAN 70 milliGRAM(s)/deciliter  lidocaine 4% Cream 1 Application(s) Topical every 4 hours PRN morrison/meatus pain      I&O's Summary    19 Nov 2023 07:01  -  20 Nov 2023 07:00  --------------------------------------------------------  IN: 360 mL / OUT: 700 mL / NET: -340 mL    20 Nov 2023 07:01  -  20 Nov 2023 09:42  --------------------------------------------------------  IN: 0 mL / OUT: 300 mL / NET: -300 mL        PHYSICAL EXAM:  Vital Signs Last 24 Hrs  T(C): 37.1 (20 Nov 2023 09:27), Max: 37.6 (20 Nov 2023 02:24)  T(F): 98.7 (20 Nov 2023 09:27), Max: 99.6 (20 Nov 2023 02:24)  HR: 102 (20 Nov 2023 09:27) (100 - 113)  BP: 124/68 (20 Nov 2023 09:27) (111/48 - 130/70)  BP(mean): --  RR: 18 (20 Nov 2023 09:27) (17 - 18)  SpO2: 96% (20 Nov 2023 09:27) (93% - 100%)    Parameters below as of 20 Nov 2023 09:27  Patient On (Oxygen Delivery Method): room air        GENERAL: NAD, frail thin elderly man  HEAD:  Atraumatic, Normocephalic  EYES: EOMI,, conjunctiva and sclera clear  NECK: Supple, No JVD  CHEST/LUNG: Clear , no wheeze, decreased air entry b/l  HEART: Regular rate, slightly tachy; No murmurs, rubs, or gallops  ABDOMEN: Soft, Nontender, Nondistended; Bowel sounds present  : morrison removed   EXTREMITIES:  2+ Peripheral Pulses, No clubbing, cyanosis, or edema  PSYCH: AAOx2 (person, place)      LABS:                        8.1    9.98  )-----------( 361      ( 19 Nov 2023 11:00 )             26.2                       RADIOLOGY & ADDITIONAL TESTS:  Results Reviewed:   Imaging Personally Reviewed:  Electrocardiogram Personally Reviewed:    COORDINATION OF CARE:  Care Discussed with Consultants/Other Providers [Y/N]: Urology Shilpa  Prior or Outpatient Records Reviewed [Y/N]:

## 2023-11-20 NOTE — PROGRESS NOTE ADULT - ASSESSMENT
89 y/o male NH resident with h/o HTN, DM2, HLD, severe emphysema/COPD, Dementia, Depression, Hep C (***s/p treatment?), Duodenal ulcer diease, Lung nodules/lung cancer s/p right lung surgery in 2014 w/o chemo/RT, subdural hygromas, who has had hematuria for few months, found to have bladder tumor, s/p TURBT on 11/15/23    INCOMPLETE 89 y/o male NH resident with h/o HTN, DM2, HLD, severe emphysema/COPD, Dementia, Depression, Hep C (***s/p treatment?), Duodenal ulcer diease, Lung nodules/lung cancer s/p right lung surgery in 2014 w/o chemo/RT, subdural hygromas, who has had hematuria for few months, found to have bladder tumor, s/p TURBT on 11/15/23

## 2023-11-20 NOTE — PROGRESS NOTE ADULT - NSPROGADDITIONALINFOA_GEN_ALL_CORE
Greater than 50 minutes spent with patient and on patient's care plan.
Updated pt's daughter at bedside.

## 2023-11-22 LAB
SURGICAL PATHOLOGY STUDY: SIGNIFICANT CHANGE UP
SURGICAL PATHOLOGY STUDY: SIGNIFICANT CHANGE UP

## 2023-11-28 ENCOUNTER — OUTPATIENT (OUTPATIENT)
Dept: OUTPATIENT SERVICES | Facility: HOSPITAL | Age: 88
LOS: 1 days | Discharge: HOME | End: 2023-11-28
Payer: MEDICARE

## 2023-11-28 DIAGNOSIS — Z98.890 OTHER SPECIFIED POSTPROCEDURAL STATES: Chronic | ICD-10-CM

## 2023-12-02 ENCOUNTER — APPOINTMENT (OUTPATIENT)
Dept: CT IMAGING | Facility: CLINIC | Age: 88
End: 2023-12-02
Payer: MEDICARE

## 2023-12-02 ENCOUNTER — OUTPATIENT (OUTPATIENT)
Dept: OUTPATIENT SERVICES | Facility: HOSPITAL | Age: 88
LOS: 1 days | End: 2023-12-02
Payer: MEDICARE

## 2023-12-02 DIAGNOSIS — Z98.890 OTHER SPECIFIED POSTPROCEDURAL STATES: Chronic | ICD-10-CM

## 2023-12-02 DIAGNOSIS — C67.8 MALIGNANT NEOPLASM OF OVERLAPPING SITES OF BLADDER: ICD-10-CM

## 2023-12-02 PROCEDURE — 74178 CT ABD&PLV WO CNTR FLWD CNTR: CPT | Mod: 26

## 2023-12-02 PROCEDURE — 74178 CT ABD&PLV WO CNTR FLWD CNTR: CPT

## 2023-12-02 PROCEDURE — 71260 CT THORAX DX C+: CPT

## 2023-12-02 PROCEDURE — 71260 CT THORAX DX C+: CPT | Mod: 26

## 2023-12-15 ENCOUNTER — APPOINTMENT (OUTPATIENT)
Dept: RADIATION ONCOLOGY | Facility: CLINIC | Age: 88
End: 2023-12-15
Payer: MEDICARE

## 2023-12-15 VITALS
TEMPERATURE: 96.8 F | RESPIRATION RATE: 16 BRPM | SYSTOLIC BLOOD PRESSURE: 131 MMHG | HEART RATE: 88 BPM | DIASTOLIC BLOOD PRESSURE: 76 MMHG | WEIGHT: 88.62 LBS | HEIGHT: 60 IN | BODY MASS INDEX: 17.4 KG/M2 | OXYGEN SATURATION: 94 %

## 2023-12-15 DIAGNOSIS — F19.91 OTHER PSYCHOACTIVE SUBSTANCE USE, UNSPECIFIED, IN REMISSION: ICD-10-CM

## 2023-12-15 DIAGNOSIS — R73.03 PREDIABETES.: ICD-10-CM

## 2023-12-15 DIAGNOSIS — Z86.79 PERSONAL HISTORY OF OTHER DISEASES OF THE CIRCULATORY SYSTEM: ICD-10-CM

## 2023-12-15 DIAGNOSIS — U07.1 COVID-19: ICD-10-CM

## 2023-12-15 DIAGNOSIS — Z23 ENCOUNTER FOR IMMUNIZATION: ICD-10-CM

## 2023-12-15 DIAGNOSIS — Z78.9 OTHER SPECIFIED HEALTH STATUS: ICD-10-CM

## 2023-12-15 DIAGNOSIS — Z87.891 PERSONAL HISTORY OF NICOTINE DEPENDENCE: ICD-10-CM

## 2023-12-15 DIAGNOSIS — D64.9 ANEMIA, UNSPECIFIED: ICD-10-CM

## 2023-12-15 DIAGNOSIS — Z80.3 FAMILY HISTORY OF MALIGNANT NEOPLASM OF BREAST: ICD-10-CM

## 2023-12-15 DIAGNOSIS — H91.90 UNSPECIFIED HEARING LOSS, UNSPECIFIED EAR: ICD-10-CM

## 2023-12-15 PROCEDURE — 99204 OFFICE O/P NEW MOD 45 MIN: CPT

## 2023-12-15 RX ORDER — SULFAMETHOXAZOLE AND TRIMETHOPRIM 800; 160 MG/1; MG/1
800-160 TABLET ORAL
Qty: 10 | Refills: 0 | Status: DISCONTINUED | COMMUNITY
Start: 2023-11-09 | End: 2023-12-15

## 2023-12-15 RX ORDER — MEMANTINE HYDROCHLORIDE 5 MG/1
5 TABLET, FILM COATED ORAL
Refills: 0 | Status: ACTIVE | COMMUNITY

## 2023-12-15 RX ORDER — CARBOXYMETHYLCELLULOSE SODIUM 5 MG/ML
0.5 SOLUTION/ DROPS OPHTHALMIC
Refills: 0 | Status: ACTIVE | COMMUNITY

## 2023-12-15 RX ORDER — PAROXETINE HYDROCHLORIDE 20 MG/1
20 TABLET, FILM COATED ORAL
Refills: 0 | Status: ACTIVE | COMMUNITY

## 2023-12-15 RX ORDER — INSULIN LISPRO 100 [IU]/ML
100 INJECTION, SOLUTION SUBCUTANEOUS
Refills: 0 | Status: ACTIVE | COMMUNITY

## 2023-12-15 RX ORDER — MIRTAZAPINE 15 MG/1
15 TABLET, FILM COATED ORAL
Refills: 0 | Status: ACTIVE | COMMUNITY

## 2023-12-15 RX ORDER — POLYETHYLENE GLYCOL 3350 17 G/17G
17 POWDER, FOR SOLUTION ORAL
Refills: 0 | Status: ACTIVE | COMMUNITY

## 2023-12-15 RX ORDER — SODIUM PHOSPHATE, DIBASIC AND SODIUM PHOSPHATE, MONOBASIC 7; 19 G/230ML; G/230ML
ENEMA RECTAL
Refills: 0 | Status: ACTIVE | COMMUNITY

## 2023-12-15 RX ORDER — MONTELUKAST SODIUM 10 MG/1
10 TABLET, FILM COATED ORAL
Refills: 0 | Status: ACTIVE | COMMUNITY

## 2023-12-15 RX ORDER — TAMSULOSIN HYDROCHLORIDE 0.4 MG/1
0.4 CAPSULE ORAL
Refills: 0 | Status: ACTIVE | COMMUNITY

## 2023-12-15 RX ORDER — ENOXAPARIN SODIUM 100 MG/ML
40 INJECTION SUBCUTANEOUS
Refills: 0 | Status: DISCONTINUED | COMMUNITY
End: 2023-12-15

## 2023-12-15 RX ORDER — MEMANTINE HYDROCHLORIDE 5 MG/1
5 TABLET ORAL
Refills: 0 | Status: DISCONTINUED | COMMUNITY

## 2023-12-15 RX ORDER — TIOTROPIUM BROMIDE 18 UG/1
18 CAPSULE ORAL; RESPIRATORY (INHALATION)
Qty: 90 | Refills: 0 | Status: DISCONTINUED | COMMUNITY
Start: 2021-08-19 | End: 2023-12-15

## 2023-12-15 RX ORDER — METFORMIN HYDROCHLORIDE 500 MG/1
500 TABLET, COATED ORAL
Refills: 0 | Status: ACTIVE | COMMUNITY

## 2023-12-15 RX ORDER — SERTRALINE 25 MG/1
25 TABLET, FILM COATED ORAL
Refills: 0 | Status: DISCONTINUED | COMMUNITY
End: 2023-12-15

## 2023-12-15 RX ORDER — DOCUSATE SODIUM 100 MG/1
100 CAPSULE ORAL
Refills: 0 | Status: ACTIVE | COMMUNITY

## 2023-12-15 RX ORDER — MULTIVIT-MIN/FOLIC/VIT K/LYCOP 400-300MCG
500 TABLET ORAL
Refills: 0 | Status: ACTIVE | COMMUNITY

## 2023-12-15 RX ORDER — PAROXETINE HYDROCHLORIDE 40 MG/1
40 TABLET, FILM COATED ORAL
Refills: 0 | Status: ACTIVE | COMMUNITY

## 2023-12-15 RX ORDER — ACETAMINOPHEN 325 MG/1
325 TABLET ORAL
Refills: 0 | Status: ACTIVE | COMMUNITY

## 2023-12-15 RX ORDER — TIOTROPIUM BROMIDE 18 UG/1
18 CAPSULE ORAL; RESPIRATORY (INHALATION)
Refills: 0 | Status: ACTIVE | COMMUNITY

## 2023-12-15 RX ORDER — FERROUS SULFATE TAB 325 MG (65 MG ELEMENTAL FE) 325 (65 FE) MG
325 (65 FE) TAB ORAL
Refills: 0 | Status: ACTIVE | COMMUNITY

## 2023-12-15 NOTE — PHYSICAL EXAM
[Sclera] : the sclera and conjunctiva were normal [Outer Ear] : the ears and nose were normal in appearance [Abdomen Soft] : soft [Nondistended] : nondistended [Abdomen Tenderness] : non-tender [Musculoskeletal - Swelling] : no joint swelling [Skin Color & Pigmentation] : normal skin color and pigmentation [No Focal Deficits] : no focal deficits [Oriented To Time, Place, And Person] : oriented to person, place, and time [Thin] : thin [Normal] : no palpable adenopathy [de-identified] : BMI 17

## 2023-12-15 NOTE — HISTORY OF PRESENT ILLNESS
[FreeTextEntry1] : Quentin Donovan is a 89yo M with bladder cancer. Patient presents for initial consultation regarding radiation therapy.  He is accompanied by two of his daughters and a son in law for today's visit.   Diagnosis: T2N0, Stage II High Grade Urothelial Carcinoma  Brief Oncological History: History of lung cancer resected in 2015.   Noted 3 months of hematuria, Dr. Ortega, his outside Urologist ordered CT imain.  9/28/23 - CT A/P at [facility] showed tumor arising at the right posterolatearl bladder diverticulum, invaading the right lateral wall of the bladder. Suspected locoregional involvement, right perivesical fat without LNs or hydronephrosis. Also concern for pyeloneprithis.   11/15/23 - Underwent cystoscopy with TURBT, pathology showing high grade invasive urothelial carcinoma, with papillary and flat urothelial carcinoma in situ. Carcinoma invades the muscularis propria. No LVI.   12/02/23 - CT C/A/P noted Multiple bladder masses measuring up to 4 cm, some of which are cystic with 2 bladder diverticuli along the dome.  12/15/23 - presents in consultation to discuss radiation therapy options. Mr. Mcgregor is accompanied by his family today. He denies any pain and hasn't had any bleeding since his surgery as per his family members. They look forward to the next steps in his treatment.

## 2023-12-15 NOTE — VITALS
[Maximal Pain Intensity: 0/10] : 0/10 [Least Pain Intensity: 0/10] : 0/10 [NoTreatment Scheduled] : no treatment scheduled [ECOG Performance Status: 3 - Capable of only limited self care, confined to bed or chair more than 50% of waking hours] : Performance Status: 3 - Capable of only limited self care, confined to bed or chair more than 50% of waking hours [Date: ____________] : Patient's last distress assessment performed on [unfilled]. [1 - Distress Level] : Distress Level: 1 [Patient given social work contact information and resource sheet] : Patient was given social work contact information and resource sheet [60: Requires occasional assistance, but is able to care for most of his/her needs] : 60: Requires occasional assistance, but is able to care for most of his/her needs

## 2023-12-18 DIAGNOSIS — C67.9 MALIGNANT NEOPLASM OF BLADDER, UNSPECIFIED: ICD-10-CM

## 2023-12-21 ENCOUNTER — NON-APPOINTMENT (OUTPATIENT)
Age: 88
End: 2023-12-21

## 2023-12-21 PROCEDURE — 77334 RADIATION TREATMENT AID(S): CPT | Mod: 26

## 2023-12-21 PROCEDURE — 77263 THER RADIOLOGY TX PLNG CPLX: CPT

## 2024-01-02 PROCEDURE — 77338 DESIGN MLC DEVICE FOR IMRT: CPT | Mod: 26

## 2024-01-02 PROCEDURE — 77300 RADIATION THERAPY DOSE PLAN: CPT | Mod: 26

## 2024-01-02 PROCEDURE — 77301 RADIOTHERAPY DOSE PLAN IMRT: CPT | Mod: 26

## 2024-01-19 ENCOUNTER — NON-APPOINTMENT (OUTPATIENT)
Age: 89
End: 2024-01-19

## 2024-01-26 ENCOUNTER — NON-APPOINTMENT (OUTPATIENT)
Age: 89
End: 2024-01-26

## 2024-01-26 DIAGNOSIS — Z92.3 PERSONAL HISTORY OF IRRADIATION: ICD-10-CM

## 2024-01-26 PROCEDURE — 77014: CPT | Mod: 26

## 2024-01-26 NOTE — DISEASE MANAGEMENT
[TTNM] : 2 [NTNM] : 0 [MTNM] : 0 [de-identified] : 600cGy [de-identified] : 3600cGy [de-identified] : Bladder

## 2024-01-26 NOTE — REVIEW OF SYSTEMS
[Constipation: Grade 0] : Constipation: Grade 0 [Diarrhea: Grade 0] : Diarrhea: Grade 0 [Fatigue: Grade 2 - Fatigue not relieved by rest; limiting instrumental ADL] : Fatigue: Grade 2 - Fatigue not relieved by rest; limiting instrumental ADL [Hematuria: Grade 0] : Hematuria: Grade 0 [Urinary Incontinence: Grade 0] : Urinary Incontinence: Grade 0  [Urinary Retention: Grade 0] : Urinary Retention: Grade 0 [Urinary Tract Pain: Grade 0] : Urinary Tract Pain: Grade 0 [Urinary Urgency: Grade 0] : Urinary Urgency: Grade 0 [Urinary Frequency: Grade 1 - Present] : Urinary Frequency: Grade 1 - Present [FreeTextEntry1] : urine dark in color, no gross hematuria  [FreeTextEntry9] : on Tamsulosin

## 2024-01-26 NOTE — HISTORY OF PRESENT ILLNESS
[FreeTextEntry1] : Quentin Donovan is an 89yo M who is being seen for an on treatment visit.  He is accompanied by his daughter and son in law for the visit.    Diagnosis: T2N0, Stage II High Grade Urothelial Carcinoma  Brief Oncological History: History of lung cancer resected in 2015.   Noted 3 months of hematuria, Dr. Ortega, his outside Urologist ordered CT imaging.  9/28/23 - CT A/P at [facility] showed tumor arising at the right posterolatearl bladder diverticulum, invading the right lateral wall of the bladder. Suspected locoregional involvement, right perivesical fat without LNs or hydronephrosis. Also concern for pyelonephritis.   11/15/23 - Underwent cystoscopy with TURBT, pathology showing high grade invasive urothelial carcinoma, with papillary and flat urothelial carcinoma in situ. Carcinoma invades the muscularis propria. No LVI.   12/02/23 - CT C/A/P noted Multiple bladder masses measuring up to 4 cm, some of which are cystic with 2 bladder diverticuli along the dome.  12/15/23 - presented in consultation to discuss radiation therapy options. Mr. Mcgregor is accompanied by his family today. He denies any pain and hasn't had any bleeding since his surgery as per his family members. They look forward to the next steps in his treatment.  He is 88 years old and lives in a care home. He has had almost no hematuria since TURBT. We discussed and reviewed the natural history of disease, radiographic and pathological findings and the role of radiation therapy in his cancer care. He is relatively frail and chemoradiation or cystectomy would not be appropriate. We discussed and recommended a palliative course of external radiation therapy, 1 fraction per week for 5-6 weeks for palliation of bleeding. He is aware this is not given with intent to cure, but to control symptoms in the short to medium term. Observation and a watch-and-wait approach was also discussed, as an alternative. He has opted for palliative radiation now, and we have discussed logistics and acute and late side effects in detail. This was understood by the patient with appropriate time provided to answer the patient and families' questions. Consent obtained for CT Simulation, which was signed by the patient and his health proxy (daughter).  1/26/24 - started on RADIATION Therapy to Bladder, 3600 cGy in 6 weekly treatments.  1/26/24 - OTV 1/6 fx completed. Mr. Mcgregor tolerated treatment well today.  He complains of tiredness, as per the family he doesn't get much activity in the center as the stopped his PT.  Denies pain. He has had an increase in urinary frequency and the family noticed that the urine is a bit darker in color, no gross hematuria noted.  Denies any bowel issues.  Reinforced what to expect with radiation therapy.

## 2024-02-02 ENCOUNTER — NON-APPOINTMENT (OUTPATIENT)
Age: 89
End: 2024-02-02

## 2024-02-02 PROCEDURE — 77014: CPT | Mod: 26

## 2024-02-02 NOTE — REVIEW OF SYSTEMS
[Constipation: Grade 0] : Constipation: Grade 0 [Diarrhea: Grade 0] : Diarrhea: Grade 0 [Fatigue: Grade 2 - Fatigue not relieved by rest; limiting instrumental ADL] : Fatigue: Grade 2 - Fatigue not relieved by rest; limiting instrumental ADL [Urinary Incontinence: Grade 0] : Urinary Incontinence: Grade 0  [Urinary Retention: Grade 0] : Urinary Retention: Grade 0 [Urinary Tract Pain: Grade 0] : Urinary Tract Pain: Grade 0 [Urinary Urgency: Grade 0] : Urinary Urgency: Grade 0 [Urinary Frequency: Grade 1 - Present] : Urinary Frequency: Grade 1 - Present [Hematuria: Grade 1 - Asymptomatic; clinical or diagnostic observations only; intervention not indicated] : Hematuria: Grade 1 - Asymptomatic; clinical or diagnostic observations only; intervention not indicated [FreeTextEntry1] : clot noted in urine, no gross hematuria  [FreeTextEntry9] : on Tamsulosin

## 2024-02-02 NOTE — VITALS
[NoTreatment Scheduled] : no treatment scheduled [Maximal Pain Intensity: 0/10] : 0/10 [Least Pain Intensity: 0/10] : 0/10 [60: Requires occasional assistance, but is able to care for most of his/her needs] : 60: Requires occasional assistance, but is able to care for most of his/her needs [ECOG Performance Status: 3 - Capable of only limited self care, confined to bed or chair more than 50% of waking hours] : Performance Status: 3 - Capable of only limited self care, confined to bed or chair more than 50% of waking hours

## 2024-02-02 NOTE — HISTORY OF PRESENT ILLNESS
[FreeTextEntry1] : Quentin Donovan is an 89yo M who is being seen for an on treatment visit.  He is accompanied by his son in law for the visit.    Diagnosis: T2N0, Stage II High Grade Urothelial Carcinoma  Brief Oncological History: History of lung cancer resected in 2015.   Noted 3 months of hematuria, Dr. Ortega, his outside Urologist ordered CT imaging.  9/28/23 - CT A/P at [facility] showed tumor arising at the right posterolatearl bladder diverticulum, invading the right lateral wall of the bladder. Suspected locoregional involvement, right perivesical fat without LNs or hydronephrosis. Also concern for pyelonephritis.   11/15/23 - Underwent cystoscopy with TURBT, pathology showing high grade invasive urothelial carcinoma, with papillary and flat urothelial carcinoma in situ. Carcinoma invades the muscularis propria. No LVI.   12/02/23 - CT C/A/P noted Multiple bladder masses measuring up to 4 cm, some of which are cystic with 2 bladder diverticuli along the dome.  12/15/23 - presented in consultation to discuss radiation therapy options. Mr. Mcgregor is accompanied by his family today. He denies any pain and hasn't had any bleeding since his surgery as per his family members. They look forward to the next steps in his treatment.  He is 88 years old and lives in a care home. He has had almost no hematuria since TURBT. We discussed and reviewed the natural history of disease, radiographic and pathological findings and the role of radiation therapy in his cancer care. He is relatively frail and chemoradiation or cystectomy would not be appropriate. We discussed and recommended a palliative course of external radiation therapy, 1 fraction per week for 5-6 weeks for palliation of bleeding. He is aware this is not given with intent to cure, but to control symptoms in the short to medium term. Observation and a watch-and-wait approach was also discussed, as an alternative. He has opted for palliative radiation now, and we have discussed logistics and acute and late side effects in detail. This was understood by the patient with appropriate time provided to answer the patient and families' questions. Consent obtained for CT Simulation, which was signed by the patient and his health proxy (daughter).  1/26/24 - started on RADIATION Therapy to Bladder, 3600 cGy in 6 weekly treatments.  1/26/24 - OTV 1/6 fx completed. Mr. Mcgregor tolerated treatment well today.  He complains of tiredness, as per the family he doesn't get much activity in the center as the stopped his PT.  Denies pain. He has had an increase in urinary frequency and the family noticed that the urine is a bit darker in color, no gross hematuria noted.  Denies any bowel issues.  Reinforced what to expect with radiation therapy.    2/2/24 - OTV 2/6 fx completed.  Mr. Mcgregor did well with treatment today, continues with tiredness.  Son in law states that he noticed a clot in his urine, no willy bleeding and no pain.  No bowel issues.

## 2024-02-02 NOTE — DISEASE MANAGEMENT
[Clinical] : TNM Stage: c [II] : II [TTNM] : 2 [NTNM] : 0 [MTNM] : 0 [de-identified] : 1200cGy [de-identified] : 3600cGy [de-identified] : Bladder

## 2024-02-09 ENCOUNTER — NON-APPOINTMENT (OUTPATIENT)
Age: 89
End: 2024-02-09

## 2024-02-09 PROCEDURE — 77014: CPT | Mod: 26

## 2024-02-09 NOTE — DISEASE MANAGEMENT
[Clinical] : TNM Stage: c [II] : II [TTNM] : 2 [NTNM] : 0 [MTNM] : 0 [de-identified] : 1800cGy [de-identified] : 3600cGy [de-identified] : Bladder

## 2024-02-09 NOTE — REVIEW OF SYSTEMS
[Constipation: Grade 0] : Constipation: Grade 0 [Diarrhea: Grade 0] : Diarrhea: Grade 0 [Fatigue: Grade 2 - Fatigue not relieved by rest; limiting instrumental ADL] : Fatigue: Grade 2 - Fatigue not relieved by rest; limiting instrumental ADL [Hematuria: Grade 1 - Asymptomatic; clinical or diagnostic observations only; intervention not indicated] : Hematuria: Grade 1 - Asymptomatic; clinical or diagnostic observations only; intervention not indicated [Urinary Incontinence: Grade 0] : Urinary Incontinence: Grade 0  [Urinary Retention: Grade 0] : Urinary Retention: Grade 0 [Urinary Tract Pain: Grade 1 - Mild pain] : Urinary Tract Pain: Grade 1 - Mild pain [Urinary Urgency: Grade 0] : Urinary Urgency: Grade 0 [Urinary Frequency: Grade 1 - Present] : Urinary Frequency: Grade 1 - Present [FreeTextEntry4] : occasional dysuria  [FreeTextEntry9] : on Tamsulosin

## 2024-02-09 NOTE — HISTORY OF PRESENT ILLNESS
[FreeTextEntry1] : Quentin Donovan is an 87yo M who is being seen for an on treatment visit.  He is accompanied by his daughter for the visit.    Diagnosis: T2N0, Stage II High Grade Urothelial Carcinoma  Brief Oncological History: History of lung cancer resected in 2015.   Noted 3 months of hematuria, Dr. Ortega, his outside Urologist ordered CT imaging.  9/28/23 - CT A/P at [facility] showed tumor arising at the right posterolatearl bladder diverticulum, invading the right lateral wall of the bladder. Suspected locoregional involvement, right perivesical fat without LNs or hydronephrosis. Also concern for pyelonephritis.   11/15/23 - Underwent cystoscopy with TURBT, pathology showing high grade invasive urothelial carcinoma, with papillary and flat urothelial carcinoma in situ. Carcinoma invades the muscularis propria. No LVI.   12/02/23 - CT C/A/P noted Multiple bladder masses measuring up to 4 cm, some of which are cystic with 2 bladder diverticuli along the dome.  12/15/23 - presented in consultation to discuss radiation therapy options. Mr. Mcgregor is accompanied by his family today. He denies any pain and hasn't had any bleeding since his surgery as per his family members. They look forward to the next steps in his treatment.  He is 88 years old and lives in a care home. He has had almost no hematuria since TURBT. We discussed and reviewed the natural history of disease, radiographic and pathological findings and the role of radiation therapy in his cancer care. He is relatively frail and chemoradiation or cystectomy would not be appropriate. We discussed and recommended a palliative course of external radiation therapy, 1 fraction per week for 5-6 weeks for palliation of bleeding. He is aware this is not given with intent to cure, but to control symptoms in the short to medium term. Observation and a watch-and-wait approach was also discussed, as an alternative. He has opted for palliative radiation now, and we have discussed logistics and acute and late side effects in detail. This was understood by the patient with appropriate time provided to answer the patient and families' questions. Consent obtained for CT Simulation, which was signed by the patient and his health proxy (daughter).  1/26/24 - started on RADIATION Therapy to Bladder, 3600 cGy in 6 weekly treatments.  1/26/24 - OTV 1/6 fx completed. Mr. Mcgregor tolerated treatment well today.  He complains of tiredness, as per the family he doesn't get much activity in the center as the stopped his PT.  Denies pain. He has had an increase in urinary frequency and the family noticed that the urine is a bit darker in color, no gross hematuria noted.  Denies any bowel issues.  Reinforced what to expect with radiation therapy.    2/2/24 - OTV 2/6 fx completed.  Mr. Mcgregor did well with treatment today, continues with tiredness.  Son in law states that he noticed a clot in his urine, no willy bleeding and no pain.  No bowel issues.     2/9/24 - OTV 3/6 fx completed. Mr. Mcgregor continues to tolerate the treatments. Daughter states he still has some occasional blood in the urine, no gross hematuria noted, does have some dysuria.  Denies any bowel issues.  Continues with tiredness and fatigue.

## 2024-02-16 ENCOUNTER — NON-APPOINTMENT (OUTPATIENT)
Age: 89
End: 2024-02-16

## 2024-02-16 PROCEDURE — 77014: CPT | Mod: 26

## 2024-02-16 NOTE — VITALS
[NoTreatment Scheduled] : no treatment scheduled [60: Requires occasional assistance, but is able to care for most of his/her needs] : 60: Requires occasional assistance, but is able to care for most of his/her needs [Maximal Pain Intensity: 0/10] : 0/10 [Least Pain Intensity: 0/10] : 0/10 [ECOG Performance Status: 3 - Capable of only limited self care, confined to bed or chair more than 50% of waking hours] : Performance Status: 3 - Capable of only limited self care, confined to bed or chair more than 50% of waking hours

## 2024-02-16 NOTE — DISEASE MANAGEMENT
[Clinical] : TNM Stage: c [II] : II [TTNM] : 2 [NTNM] : 0 [MTNM] : 0 [de-identified] : 2400cGy [de-identified] : 3600cGy [de-identified] : Bladder

## 2024-02-16 NOTE — REVIEW OF SYSTEMS
[Constipation: Grade 0] : Constipation: Grade 0 [Diarrhea: Grade 0] : Diarrhea: Grade 0 [Urinary Incontinence: Grade 0] : Urinary Incontinence: Grade 0  [Urinary Retention: Grade 0] : Urinary Retention: Grade 0 [Urinary Tract Pain: Grade 1 - Mild pain] : Urinary Tract Pain: Grade 1 - Mild pain [Urinary Urgency: Grade 0] : Urinary Urgency: Grade 0 [Urinary Frequency: Grade 1 - Present] : Urinary Frequency: Grade 1 - Present [Fatigue: Grade 1 - Fatigue relieved by rest] : Fatigue: Grade 1 - Fatigue relieved by rest [Hematuria: Grade 0] : Hematuria: Grade 0 [FreeTextEntry4] : occasional dysuria  [FreeTextEntry9] : on Tamsulosin

## 2024-02-20 NOTE — VITALS
[Maximal Pain Intensity: 0/10] : 0/10 [Least Pain Intensity: 0/10] : 0/10 [NoTreatment Scheduled] : no treatment scheduled [60: Requires occasional assistance, but is able to care for most of his/her needs] : 60: Requires occasional assistance, but is able to care for most of his/her needs [ECOG Performance Status: 3 - Capable of only limited self care, confined to bed or chair more than 50% of waking hours] : Performance Status: 3 - Capable of only limited self care, confined to bed or chair more than 50% of waking hours

## 2024-02-20 NOTE — REVIEW OF SYSTEMS
[Constipation: Grade 0] : Constipation: Grade 0 [Diarrhea: Grade 0] : Diarrhea: Grade 0 [Fatigue: Grade 1 - Fatigue relieved by rest] : Fatigue: Grade 1 - Fatigue relieved by rest [Hematuria: Grade 0] : Hematuria: Grade 0 [Urinary Incontinence: Grade 0] : Urinary Incontinence: Grade 0  [Urinary Retention: Grade 0] : Urinary Retention: Grade 0 [Urinary Tract Pain: Grade 1 - Mild pain] : Urinary Tract Pain: Grade 1 - Mild pain [Urinary Urgency: Grade 0] : Urinary Urgency: Grade 0 [Urinary Frequency: Grade 1 - Present] : Urinary Frequency: Grade 1 - Present

## 2024-02-21 ENCOUNTER — NON-APPOINTMENT (OUTPATIENT)
Age: 89
End: 2024-02-21

## 2024-02-23 PROCEDURE — 77014: CPT | Mod: 26

## 2024-02-23 PROCEDURE — 77427 RADIATION TX MANAGEMENT X5: CPT

## 2024-03-01 ENCOUNTER — NON-APPOINTMENT (OUTPATIENT)
Age: 89
End: 2024-03-01

## 2024-03-01 PROCEDURE — 77014: CPT | Mod: 26

## 2024-03-01 NOTE — REVIEW OF SYSTEMS
[Constipation: Grade 0] : Constipation: Grade 0 [Diarrhea: Grade 0] : Diarrhea: Grade 0 [Hematuria: Grade 0] : Hematuria: Grade 0 [Fatigue: Grade 1 - Fatigue relieved by rest] : Fatigue: Grade 1 - Fatigue relieved by rest [Urinary Incontinence: Grade 0] : Urinary Incontinence: Grade 0  [Urinary Retention: Grade 0] : Urinary Retention: Grade 0 [Urinary Tract Pain: Grade 1 - Mild pain] : Urinary Tract Pain: Grade 1 - Mild pain [Urinary Urgency: Grade 0] : Urinary Urgency: Grade 0 [Urinary Frequency: Grade 1 - Present] : Urinary Frequency: Grade 1 - Present [FreeTextEntry9] : on Tamsulosin  [FreeTextEntry4] : occasional dysuria

## 2024-03-01 NOTE — DISEASE MANAGEMENT
[Clinical] : TNM Stage: c [II] : II [TTNM] : 2 [NTNM] : 0 [MTNM] : 0 [de-identified] : 3600cGy [de-identified] : 3600cGy [de-identified] : Bladder

## 2024-03-01 NOTE — HISTORY OF PRESENT ILLNESS
[FreeTextEntry1] : Quentin Donovan is an 87yo M who is being seen for an on treatment visit.  He is accompanied by his daughter and son in law for the visit.    Diagnosis: T2N0, Stage II High Grade Urothelial Carcinoma  Brief Oncological History: History of lung cancer resected in 2015.   Noted 3 months of hematuria, Dr. Ortega, his outside Urologist ordered CT imaging.  9/28/23 - CT A/P at [facility] showed tumor arising at the right posterolatearl bladder diverticulum, invading the right lateral wall of the bladder. Suspected locoregional involvement, right perivesical fat without LNs or hydronephrosis. Also concern for pyelonephritis.   11/15/23 - Underwent cystoscopy with TURBT, pathology showing high grade invasive urothelial carcinoma, with papillary and flat urothelial carcinoma in situ. Carcinoma invades the muscularis propria. No LVI.   12/02/23 - CT C/A/P noted Multiple bladder masses measuring up to 4 cm, some of which are cystic with 2 bladder diverticuli along the dome.  12/15/23 - presented in consultation to discuss radiation therapy options. Mr. Mcgregor is accompanied by his family today. He denies any pain and hasn't had any bleeding since his surgery as per his family members. They look forward to the next steps in his treatment.  He is 88 years old and lives in a care home. He has had almost no hematuria since TURBT. We discussed and reviewed the natural history of disease, radiographic and pathological findings and the role of radiation therapy in his cancer care. He is relatively frail and chemoradiation or cystectomy would not be appropriate. We discussed and recommended a palliative course of external radiation therapy, 1 fraction per week for 5-6 weeks for palliation of bleeding. He is aware this is not given with intent to cure, but to control symptoms in the short to medium term. Observation and a watch-and-wait approach was also discussed, as an alternative. He has opted for palliative radiation now, and we have discussed logistics and acute and late side effects in detail. This was understood by the patient with appropriate time provided to answer the patient and families' questions. Consent obtained for CT Simulation, which was signed by the patient and his health proxy (daughter).  1/26/24 - started on RADIATION Therapy to Bladder, 3600 cGy in 6 weekly treatments.  1/26/24 - OTV 1/6 fx completed. Mr. Mcgregor tolerated treatment well today.  He complains of tiredness, as per the family he doesn't get much activity in the center as the stopped his PT.  Denies pain. He has had an increase in urinary frequency and the family noticed that the urine is a bit darker in color, no gross hematuria noted.  Denies any bowel issues.  Reinforced what to expect with radiation therapy.    2/2/24 - OTV 2/6 fx completed.  Mr. Mcgregor did well with treatment today, continues with tiredness.  Son in law states that he noticed a clot in his urine, no willy bleeding and no pain.  No bowel issues.     2/9/24 - OTV 3/6 fx completed. Mr. Mcgregor continues to tolerate the treatments. Daughter states he still has some occasional blood in the urine, no gross hematuria noted, does have some dysuria.  Denies any bowel issues.  Continues with tiredness and fatigue.    2/16/24 - OTV 4/6 fx completed. Mr. Mcgregor looks good today; he seems a bit stronger and more alert -his family agrees.  He denies any pain or other complaints.  His son in law states that urine is clearer, no clots noted.  No bowel complaints.  They are pleased the treatments are going well.    2/21/24 - OTV has completed 4/6 fx so far, to have treatment 5/6 on Friday 2/23/24.   3/1/24 - OTV 6/6 fx completed.  Discharge instructions/folder given and reviewed. Post treatment evaluation appointment scheduled for 4/9/24.

## 2024-03-15 ENCOUNTER — NON-APPOINTMENT (OUTPATIENT)
Age: 89
End: 2024-03-15

## 2024-04-03 NOTE — VITALS
[Maximal Pain Intensity: 0/10] : 0/10 [NoTreatment Scheduled] : no treatment scheduled [Least Pain Intensity: 0/10] : 0/10 [60: Requires occasional assistance, but is able to care for most of his/her needs] : 60: Requires occasional assistance, but is able to care for most of his/her needs [ECOG Performance Status: 3 - Capable of only limited self care, confined to bed or chair more than 50% of waking hours] : Performance Status: 3 - Capable of only limited self care, confined to bed or chair more than 50% of waking hours

## 2024-04-03 NOTE — HISTORY OF PRESENT ILLNESS
[FreeTextEntry1] : Quentin Donovan is an 89yo M who is being seen for post treatment evaluation appointment.   Diagnosis: T2N0, Stage II High Grade Urothelial Carcinoma  RADIATION Therapy: 1/26/24 - 3/1/24: Received RADIATION Therapy to Bladder, 3600 cGy in 6 weekly treatments.  Brief Oncological History: History of lung cancer resected in 2015.   Noted 3 months of hematuria, Dr. Ortega, his outside Urologist ordered CT imaging.  9/28/23 - CT A/P at [facility] showed tumor arising at the right posterolatearl bladder diverticulum, invading the right lateral wall of the bladder. Suspected locoregional involvement, right perivesical fat without LNs or hydronephrosis. Also concern for pyelonephritis.   11/15/23 - Underwent cystoscopy with TURBT, pathology showing high grade invasive urothelial carcinoma, with papillary and flat urothelial carcinoma in situ. Carcinoma invades the muscularis propria. No LVI.   12/02/23 - CT C/A/P noted Multiple bladder masses measuring up to 4 cm, some of which are cystic with 2 bladder diverticuli along the dome.  12/15/23 - presented in consultation to discuss radiation therapy options. Mr. Mcgregor is accompanied by his family today. He denies any pain and hasn't had any bleeding since his surgery as per his family members. They look forward to the next steps in his treatment.  He is 88 years old and lives in a care home. He has had almost no hematuria since TURBT. We discussed and reviewed the natural history of disease, radiographic and pathological findings and the role of radiation therapy in his cancer care. He is relatively frail and chemoradiation or cystectomy would not be appropriate. We discussed and recommended a palliative course of external radiation therapy, 1 fraction per week for 5-6 weeks for palliation of bleeding. He is aware this is not given with intent to cure, but to control symptoms in the short to medium term. Observation and a watch-and-wait approach was also discussed, as an alternative. He has opted for palliative radiation now, and we have discussed logistics and acute and late side effects in detail. This was understood by the patient with appropriate time provided to answer the patient and families' questions. Consent obtained for CT Simulation, which was signed by the patient and his health proxy (daughter).  1/26/24 - 3/1/24: Received RADIATION Therapy to Bladder, 3600 cGy in 6 weekly treatments.  4/9/24 - presents for post treatment evaluation appointment.   He is to follow up with Dr. Lopez (urology) in June for cystoscopy.

## 2024-04-08 ENCOUNTER — NON-APPOINTMENT (OUTPATIENT)
Age: 89
End: 2024-04-08

## 2024-04-09 ENCOUNTER — APPOINTMENT (OUTPATIENT)
Dept: RADIATION ONCOLOGY | Facility: CLINIC | Age: 89
End: 2024-04-09

## 2024-04-09 ENCOUNTER — NON-APPOINTMENT (OUTPATIENT)
Age: 89
End: 2024-04-09

## 2024-06-06 ENCOUNTER — APPOINTMENT (OUTPATIENT)
Dept: UROLOGY | Facility: CLINIC | Age: 89
End: 2024-06-06
Payer: MEDICARE

## 2024-06-06 ENCOUNTER — OUTPATIENT (OUTPATIENT)
Dept: OUTPATIENT SERVICES | Facility: HOSPITAL | Age: 89
LOS: 1 days | End: 2024-06-06
Payer: MEDICARE

## 2024-06-06 VITALS — DIASTOLIC BLOOD PRESSURE: 63 MMHG | SYSTOLIC BLOOD PRESSURE: 109 MMHG | HEART RATE: 120 BPM

## 2024-06-06 VITALS — SYSTOLIC BLOOD PRESSURE: 130 MMHG | HEART RATE: 120 BPM | DIASTOLIC BLOOD PRESSURE: 82 MMHG

## 2024-06-06 DIAGNOSIS — Z98.890 OTHER SPECIFIED POSTPROCEDURAL STATES: Chronic | ICD-10-CM

## 2024-06-06 DIAGNOSIS — R35.0 FREQUENCY OF MICTURITION: ICD-10-CM

## 2024-06-06 DIAGNOSIS — C67.8 MALIGNANT NEOPLASM OF OVERLAPPING SITES OF BLADDER: ICD-10-CM

## 2024-06-06 PROCEDURE — 52000 CYSTOURETHROSCOPY: CPT

## 2024-06-07 DIAGNOSIS — C67.8 MALIGNANT NEOPLASM OF OVERLAPPING SITES OF BLADDER: ICD-10-CM

## 2024-06-07 LAB — URINE CYTOLOGY: NORMAL

## 2024-07-23 NOTE — ASU PREOP CHECKLIST - SITE MARKED BY SURGEON
7/23/2024 2:48 PM I  met with patient. She lives at an assisted Living. I discussed that her assisted living is requesting that she go to SNF prior to returning . She agrees and requests referral to [facility], [facility] and [facility]. [facility]   n/a

## 2024-09-10 DIAGNOSIS — C67.8 MALIGNANT NEOPLASM OF OVERLAPPING SITES OF BLADDER: ICD-10-CM

## 2024-10-01 DIAGNOSIS — C67.8 MALIGNANT NEOPLASM OF OVERLAPPING SITES OF BLADDER: ICD-10-CM

## 2024-10-02 ENCOUNTER — APPOINTMENT (OUTPATIENT)
Dept: CT IMAGING | Facility: CLINIC | Age: 89
End: 2024-10-02
Payer: MEDICARE

## 2024-10-02 ENCOUNTER — NON-APPOINTMENT (OUTPATIENT)
Age: 89
End: 2024-10-02

## 2024-10-02 ENCOUNTER — OUTPATIENT (OUTPATIENT)
Dept: OUTPATIENT SERVICES | Facility: HOSPITAL | Age: 88
LOS: 1 days | Discharge: HOME | End: 2024-10-02

## 2024-10-02 ENCOUNTER — OUTPATIENT (OUTPATIENT)
Dept: OUTPATIENT SERVICES | Facility: HOSPITAL | Age: 89
LOS: 1 days | End: 2024-10-02
Payer: MEDICARE

## 2024-10-02 DIAGNOSIS — C67.8 MALIGNANT NEOPLASM OF OVERLAPPING SITES OF BLADDER: ICD-10-CM

## 2024-10-02 DIAGNOSIS — C79.11 SECONDARY MALIGNANT NEOPLASM OF BLADDER: ICD-10-CM

## 2024-10-02 DIAGNOSIS — Z98.890 OTHER SPECIFIED POSTPROCEDURAL STATES: Chronic | ICD-10-CM

## 2024-10-02 PROCEDURE — 74178 CT ABD&PLV WO CNTR FLWD CNTR: CPT

## 2024-10-02 PROCEDURE — 71260 CT THORAX DX C+: CPT | Mod: 26

## 2024-10-02 PROCEDURE — 74178 CT ABD&PLV WO CNTR FLWD CNTR: CPT | Mod: 26

## 2024-10-02 PROCEDURE — 71260 CT THORAX DX C+: CPT

## 2024-10-03 ENCOUNTER — APPOINTMENT (OUTPATIENT)
Dept: HEMATOLOGY ONCOLOGY | Facility: CLINIC | Age: 89
End: 2024-10-03
Payer: MEDICARE

## 2024-10-03 VITALS — SYSTOLIC BLOOD PRESSURE: 65 MMHG | DIASTOLIC BLOOD PRESSURE: 43 MMHG

## 2024-10-03 VITALS
DIASTOLIC BLOOD PRESSURE: 60 MMHG | HEART RATE: 90 BPM | SYSTOLIC BLOOD PRESSURE: 94 MMHG | TEMPERATURE: 97.2 F | RESPIRATION RATE: 14 BRPM

## 2024-10-03 PROCEDURE — 99205 OFFICE O/P NEW HI 60 MIN: CPT

## 2024-10-03 NOTE — HISTORY OF PRESENT ILLNESS
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 He presents for further evaluation and discussion of management options.   He resides in a nursing home where he has become significantly more weak and less functional. His appetite is very limited and he has lost a significant amount of weight. He underwent debulking of bladder tumor in 11/2023 with Dr. Lopez. Urine cytology in 6/2024 showed malignant cells. Due to worsening functional status, planned repeat cystoscopy was deferred and systemic imaging was recommended. He underwent CT CAP on 10/2/2024:  CHEST: LUNGS AND LARGE AIRWAYS: Patent central airways. Emphysema. Interval development of several spiculated bilateral pulmonary nodules, compatible with metastases. A reference right middle lobe nodule measures 2.6 cm (6, 53). A reference left upper lobe nodule measures 2.8 cm (6, 36). PLEURA: No pleural effusion. VESSELS: Atherosclerotic change. HEART: Heart size is normal. No pericardial effusion. MEDIASTINUM AND RUBI: No lymphadenopathy. CHEST WALL AND LOWER NECK: Subcentimeter hypodense thyroid nodules.  ABDOMEN AND PELVIS: LIVER: Few subcentimeter hypodense foci are too small to characterize, unchanged. BILE DUCTS: Normal caliber. GALLBLADDER: Within normal limits. SPLEEN: Within normal limits. PANCREAS: Within normal limits. ADRENALS: Within normal limits. KIDNEYS/URETERS: New heterogeneous left pelvic tumor that involves the distal left ureter and pelvic sidewall, with cranial extension into the left retroperitoneum along the left psoas. This measures 5.1 x 4.0 x 11.2 cm. No significant hydronephrosis. Slight dilatation of the left renal collecting system is only seen on excretory phase.  BLADDER: Diffusely wall thickened with diverticuli. Irregular nodular enhancement of the right bladder wall, involving the right ureterovesicular junction, is consistent with known bladder tumor. REPRODUCTIVE ORGANS: Enlarged prostate with calcification.  BOWEL: No bowel obstruction. PERITONEUM/RETROPERITONEUM: Bulky left retroperitoneal tumor as mentioned above is new from 12/2/2023. VESSELS: Atherosclerotic change. 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 The left retroperitoneal tumor involves the left common and left external iliac veins. There is also encasement of the left common and external iliac arteries. LYMPH NODES: No lymphadenopathy. ABDOMINAL WALL: Within normal limits. BONES: Degenerative changes of the spine. Osseous destruction of the left hemiaspect of the L4 and L5 vertebral bodies adjacent to the left retroperitoneal tumor, consistent with tumor invasion. No evidence of tumor invasion into the spinal canal.  IMPRESSION: *  Interval disease progression since 12/2/2023, with new bulky left retroperitoneal metastatic disease that measures up to 11 cm in craniocaudal dimension. Left retroperitoneal tumor invades the adjacent L4 and L5 vertebral bodies. *  New bilateral pulmonary metastases measuring up to 2.8 cm in size. Severe emphysema.

## 2024-10-03 NOTE — REVIEW OF SYSTEMS
[Fatigue] : fatigue [Recent Change In Weight] : ~T recent weight change [Abdominal Pain] : no abdominal pain [Vomiting] : no vomiting [Muscle Weakness] : muscle weakness [Negative] : Heme/Lymph [FreeTextEntry7] : decreased appetite

## 2024-10-03 NOTE — PHYSICAL EXAM
[Completely disabled. Cannot carry on any self care. Totally confined to bed or chair] : Status 4- Completely disabled. Cannot carry on any self care. Totally confined to bed or chair [Cachectic] : cachectic [Normal] : affect appropriate

## 2024-10-03 NOTE — ASSESSMENT
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 He presents for further evaluation and discussion of management options.  We had an extensive discussion with the patient and his family regarding his clinical history and recent imaging findings which are strongly suggestive of metastatic urothelial carcinoma involving lymph nodes and lungs. We discussed that urothelial carcinoma is an aggressive malignancy with limited effective treatment options. Even with maximal systemic therapy, median survival is limited. Given his significant weakness and poor functional status, he would not be a candidate for any systemic therapy option as he would be at very high risk for severe toxicity. In light of this, we discussed that it would be reasonable to defer cancer directed therapy in favor of maximal supportive care. To facilitate this, we would recommend referral to hospice. He and his family understood and agreed with this plan of care.   We will facilitate a referral to our SW team to help facilitate transition to hospice and remain available as needed.    [Supportive] : Goals of care discussed with patient: Supportive [Palliative Care Plan] : Patient was apprised of incurable nature of disease.  Hospice and Palliative care options discussed.

## 2024-10-03 NOTE — REASON FOR VISIT
[Initial Consultation] : an initial consultation [Family Member] : family member [FreeTextEntry2] : Metastatic urothelial carcinoma

## 2024-10-07 ENCOUNTER — NON-APPOINTMENT (OUTPATIENT)
Age: 89
End: 2024-10-07

## 2024-10-11 ENCOUNTER — NON-APPOINTMENT (OUTPATIENT)
Age: 89
End: 2024-10-11

## (undated) DEVICE — TUBING SUCTION ENDOMAT LC

## (undated) DEVICE — BAG URINE W METER 2L

## (undated) DEVICE — SYR CATH TIP 2 OZ

## (undated) DEVICE — PACK CYSTO

## (undated) DEVICE — GLV 7.5 PROTEXIS (CREAM) MICRO

## (undated) DEVICE — SOL IRR BAG H2O 3000ML

## (undated) DEVICE — LIJ/LIA-ADAPTER LCKNG ELLIK EVACUATING: Type: DURABLE MEDICAL EQUIPMENT

## (undated) DEVICE — WARMING BLANKET UPPER ADULT

## (undated) DEVICE — POSITIONER STRAP ARMBOARD VELCRO TS-30

## (undated) DEVICE — TUBING THERMADX UROLOGY

## (undated) DEVICE — SOL IRR POUR H2O 500ML

## (undated) DEVICE — GLV 8 PROTEXIS (CREAM) MICRO

## (undated) DEVICE — CANISTER DISPOSABLE THIN WALL 3000CC

## (undated) DEVICE — CABLE DAC ACTIVE CORD

## (undated) DEVICE — TUBING LEVEL ONE NORMOFLO SET

## (undated) DEVICE — ELCTR CAUTERY ROLLER BALL 24-28F

## (undated) DEVICE — ELCTR HF RESECTION LOOP 24FR 30 DEG 0.35 WIRE

## (undated) DEVICE — VENODYNE/SCD SLEEVE CALF MEDIUM

## (undated) DEVICE — ELCTR GROUNDING PAD ADULT COVIDIEN